# Patient Record
Sex: FEMALE | Race: WHITE | NOT HISPANIC OR LATINO | Employment: UNEMPLOYED | URBAN - METROPOLITAN AREA
[De-identification: names, ages, dates, MRNs, and addresses within clinical notes are randomized per-mention and may not be internally consistent; named-entity substitution may affect disease eponyms.]

---

## 2020-02-04 ENCOUNTER — TELEPHONE (OUTPATIENT)
Dept: RHEUMATOLOGY | Facility: CLINIC | Age: 40
End: 2020-02-04

## 2020-02-04 ENCOUNTER — TELEPHONE (OUTPATIENT)
Dept: DERMATOLOGY | Facility: CLINIC | Age: 40
End: 2020-02-04

## 2020-02-04 NOTE — TELEPHONE ENCOUNTER
----- Message from Shwetha Danielle sent at 2/4/2020  7:36 AM CST -----  Contact: pt [phone]  This will be a new pt who has a referral in the system she is asking for an appt.

## 2020-02-04 NOTE — TELEPHONE ENCOUNTER
Spoke to patient and booked for 3-3-20 at 11 am. Patient will bring records from previous rheumatologist. HANSEL

## 2020-02-29 ENCOUNTER — PATIENT MESSAGE (OUTPATIENT)
Dept: RHEUMATOLOGY | Facility: CLINIC | Age: 40
End: 2020-02-29

## 2020-03-03 ENCOUNTER — OFFICE VISIT (OUTPATIENT)
Dept: RHEUMATOLOGY | Facility: CLINIC | Age: 40
End: 2020-03-03
Payer: COMMERCIAL

## 2020-03-03 ENCOUNTER — LAB VISIT (OUTPATIENT)
Dept: LAB | Facility: HOSPITAL | Age: 40
End: 2020-03-03
Attending: INTERNAL MEDICINE
Payer: COMMERCIAL

## 2020-03-03 VITALS
WEIGHT: 127.63 LBS | DIASTOLIC BLOOD PRESSURE: 87 MMHG | HEIGHT: 66 IN | HEART RATE: 103 BPM | RESPIRATION RATE: 18 BRPM | SYSTOLIC BLOOD PRESSURE: 126 MMHG | BODY MASS INDEX: 20.51 KG/M2

## 2020-03-03 DIAGNOSIS — M35.00 SJOGREN'S SYNDROME WITHOUT EXTRAGLANDULAR INVOLVEMENT: ICD-10-CM

## 2020-03-03 DIAGNOSIS — L50.9 URTICARIA: ICD-10-CM

## 2020-03-03 DIAGNOSIS — D89.1 CRYOGLOBULINEMIC VASCULITIS: ICD-10-CM

## 2020-03-03 DIAGNOSIS — M35.00 SJOGREN'S SYNDROME WITHOUT EXTRAGLANDULAR INVOLVEMENT: Primary | ICD-10-CM

## 2020-03-03 DIAGNOSIS — I73.00 RAYNAUD'S PHENOMENON WITHOUT GANGRENE: ICD-10-CM

## 2020-03-03 LAB
C3 SERPL-MCNC: 106 MG/DL (ref 50–180)
C4 SERPL-MCNC: <3 MG/DL (ref 11–44)
CCP AB SER IA-ACNC: <0.5 U/ML
CRP SERPL-MCNC: 12.5 MG/L (ref 0–8.2)
ERYTHROCYTE [SEDIMENTATION RATE] IN BLOOD BY WESTERGREN METHOD: 20 MM/HR (ref 0–20)
IGA SERPL-MCNC: 313 MG/DL (ref 40–350)
RHEUMATOID FACT SERPL-ACNC: 484 IU/ML (ref 0–15)
THYROGLOB AB SERPL IA-ACNC: <4 IU/ML (ref 0–3.9)
THYROPEROXIDASE IGG SERPL-ACNC: <6 IU/ML

## 2020-03-03 PROCEDURE — 86332 IMMUNE COMPLEX ASSAY: CPT

## 2020-03-03 PROCEDURE — 99999 PR PBB SHADOW E&M-EST. PATIENT-LVL III: ICD-10-PCS | Mod: PBBFAC,,, | Performed by: INTERNAL MEDICINE

## 2020-03-03 PROCEDURE — 86235 NUCLEAR ANTIGEN ANTIBODY: CPT | Mod: 59

## 2020-03-03 PROCEDURE — 86235 NUCLEAR ANTIGEN ANTIBODY: CPT

## 2020-03-03 PROCEDURE — 86225 DNA ANTIBODY NATIVE: CPT

## 2020-03-03 PROCEDURE — 86376 MICROSOMAL ANTIBODY EACH: CPT

## 2020-03-03 PROCEDURE — 99999 PR PBB SHADOW E&M-EST. PATIENT-LVL III: CPT | Mod: PBBFAC,,, | Performed by: INTERNAL MEDICINE

## 2020-03-03 PROCEDURE — 99205 PR OFFICE/OUTPT VISIT, NEW, LEVL V, 60-74 MIN: ICD-10-PCS | Mod: S$GLB,,, | Performed by: INTERNAL MEDICINE

## 2020-03-03 PROCEDURE — 99205 OFFICE O/P NEW HI 60 MIN: CPT | Mod: S$GLB,,, | Performed by: INTERNAL MEDICINE

## 2020-03-03 PROCEDURE — 82784 ASSAY IGA/IGD/IGG/IGM EACH: CPT

## 2020-03-03 PROCEDURE — 86140 C-REACTIVE PROTEIN: CPT

## 2020-03-03 PROCEDURE — 86160 COMPLEMENT ANTIGEN: CPT

## 2020-03-03 PROCEDURE — 86160 COMPLEMENT ANTIGEN: CPT | Mod: 59

## 2020-03-03 PROCEDURE — 36415 COLL VENOUS BLD VENIPUNCTURE: CPT | Mod: PO

## 2020-03-03 PROCEDURE — 86200 CCP ANTIBODY: CPT

## 2020-03-03 PROCEDURE — 86800 THYROGLOBULIN ANTIBODY: CPT

## 2020-03-03 PROCEDURE — 86038 ANTINUCLEAR ANTIBODIES: CPT

## 2020-03-03 PROCEDURE — 3008F PR BODY MASS INDEX (BMI) DOCUMENTED: ICD-10-PCS | Mod: CPTII,S$GLB,, | Performed by: INTERNAL MEDICINE

## 2020-03-03 PROCEDURE — 83516 IMMUNOASSAY NONANTIBODY: CPT

## 2020-03-03 PROCEDURE — 86039 ANTINUCLEAR ANTIBODIES (ANA): CPT

## 2020-03-03 PROCEDURE — 86431 RHEUMATOID FACTOR QUANT: CPT

## 2020-03-03 PROCEDURE — 3008F BODY MASS INDEX DOCD: CPT | Mod: CPTII,S$GLB,, | Performed by: INTERNAL MEDICINE

## 2020-03-03 PROCEDURE — 85651 RBC SED RATE NONAUTOMATED: CPT | Mod: PO

## 2020-03-03 RX ORDER — BUSPIRONE HYDROCHLORIDE 10 MG/1
10 TABLET ORAL 3 TIMES DAILY
COMMUNITY
End: 2021-05-24 | Stop reason: SDUPTHER

## 2020-03-03 RX ORDER — TRAMADOL HYDROCHLORIDE 50 MG/1
50 TABLET ORAL EVERY 8 HOURS PRN
Qty: 90 TABLET | Refills: 3 | Status: SHIPPED | OUTPATIENT
Start: 2020-03-03 | End: 2020-07-20 | Stop reason: SDUPTHER

## 2020-03-03 RX ORDER — HYDROXYCHLOROQUINE SULFATE 200 MG/1
200 TABLET, FILM COATED ORAL DAILY
Qty: 90 TABLET | Refills: 3 | Status: SHIPPED | OUTPATIENT
Start: 2020-03-03 | End: 2020-05-26 | Stop reason: SDUPTHER

## 2020-03-03 RX ORDER — CEVIMELINE HYDROCHLORIDE 30 MG/1
30 CAPSULE ORAL 3 TIMES DAILY
Qty: 270 CAPSULE | Refills: 3 | Status: SHIPPED | OUTPATIENT
Start: 2020-03-03 | End: 2021-05-24 | Stop reason: SDUPTHER

## 2020-03-03 NOTE — LETTER
March 29, 2020      Nathan Núñez MD  [address] [facility] [address]  [facility] [address]           [place] [facility] [address] [place] [address]  Phone: [phone]  Fax: [phone]          Patient: Shamika Young   MR Number: [id number]   YOB: 1980   Date of Visit: 3/3/2020       Dear Dr. Nathan Núñez:    Thank you for referring Shamika Young to me for evaluation. Attached you will find relevant portions of my assessment and plan of care.    If you have questions, please do not hesitate to call me. I look forward to following Shamika Young along with you.    Sincerely,    Maryellen Buchanan, DO    Enclosure  CC:  No Recipients    If you would like to receive this communication electronically, please contact [email or web address] or [phone] to request more information on [employer] access.    For providers and/or their staff who would like to refer a patient to [facility], please contact us through our one-stop-shop provider referral line, [facility] , at [phone].    If you feel you have received this communication in error or would no longer like to receive these types of communications, please e-mail [email or web address]

## 2020-03-03 NOTE — PROGRESS NOTES
Subjective:          Chief Complaint: Shamika Young is a 39 y.o. female who had concerns including Pain and Disease Management.    HPI:    Patient is a 39-year-old female being referred by Dr. Chopra with hx of cryoglobulinemia vasculitis and Primary Sjogren's.    Patient seen at [facility]. Ultimately diagnosed [id number].  She states rash started during pregnancy of legs started with purpura of legs. She then developed more diffuse coalescing purpura but no open sores. No renal or respiratory disease to date.   Patient did have skin bx: LCV  Previously hypocomplementemic.   She did note fewer skin purpuric lesions after 2 cycles of RTX   She has noted more urticaria with wheal in the skin,  some reactions with food, wheat/rice seem to be worst. She notes no new soaps or detergents. Present since 2017.   She has seen allergist with some nut and environmental allergies no changes present since childhood.   Dry eyes present but tolerates contacts.   Dry mouth more problematic.   Joints fluctuate between 6-9/10. Hand and wrist predominant.   No real Raynauds in triphasic pattern but cold hands and feet.   Patient c/o swelling of lips at times-no known food allergies.     PFTs at [facility] were reportedly normal, no hemoptysis or SOB  Thinks she had CT chest.   No bone marrow bx.   No DVT, miscarriages, seizures or strokes.   No renal disease.        She is on hydroxychloroquine 200 mg twice daily.  Rituxan last dose 7/2019  cevimiline 30mg TID  Prednisone 10mg once daily PRN ( during flare daily rest is periodical.   Rituximab last dose 7/2019 (x 3 treatments 6 months apart. )     Previous medications: none  No hx of hepatitis, no malignancy in self to date.     REVIEW OF SYSTEMS:    Review of Systems   Constitutional: Positive for malaise/fatigue. Negative for fever and weight loss.   HENT: Negative for sore throat.    Eyes: Negative for double vision, photophobia and redness.   Respiratory: Negative for cough,  shortness of breath and wheezing.    Cardiovascular: Negative for chest pain, palpitations and orthopnea.   Gastrointestinal: Negative for abdominal pain, constipation and diarrhea.   Genitourinary: Negative for dysuria, hematuria and urgency.   Musculoskeletal: Positive for joint pain and myalgias. Negative for back pain.   Skin: Positive for rash (livedo).   Neurological: Negative for dizziness, tingling, focal weakness and headaches.   Endo/Heme/Allergies: Does not bruise/bleed easily.   Psychiatric/Behavioral: Negative for depression, hallucinations and suicidal ideas. The patient is nervous/anxious.                Objective:            Past Medical History:   Diagnosis Date    Allergy     Anxiety     Depression     Liver disease     Lumbago with sciatica, right side     Lumbar disc disease     L4-L5    Other chronic pain     Panic disorder     Positive CHELSEA (antinuclear antibody)     Purpura     Sjogren's syndrome     Vasculitis      Family History   Problem Relation Age of Onset    Multiple sclerosis Mother     Heart disease Mother     Diabetes Mother     Hypertension Mother     Heart disease Father     Hyperlipidemia Father      Social History     Tobacco Use    Smoking status: Former Smoker    Smokeless tobacco: Never Used   Substance Use Topics    Alcohol use: Yes     Comment: socially    Drug use: Never         Current Outpatient Medications on File Prior to Visit   Medication Sig Dispense Refill    ALPRAZolam (XANAX) 0.5 MG tablet Take 0.5 mg by mouth 3 (three) times daily.      busPIRone (BUSPAR) 10 MG tablet Take 10 mg by mouth 3 (three) times daily.      cevimeline (EVOXAC) 30 mg capsule Take 30 mg by mouth 3 (three) times daily.      hydroxychloroquine (PLAQUENIL) 200 mg tablet Take 200 mg by mouth once daily.      predniSONE (DELTASONE) 5 MG tablet Take 5 mg by mouth 2 (two) times daily.      traMADol (ULTRAM) 50 mg tablet Take 50 mg by mouth every 6 (six) hours.       No  current facility-administered medications on file prior to visit.        Vitals:    03/03/20 1118   BP: 126/87   Pulse: 103   Resp: 18       Physical Exam:    Physical Exam   Constitutional: She is oriented to person, place, and time. She appears well-developed and well-nourished.   HENT:   Head: Normocephalic and atraumatic.   Mouth/Throat: Oropharynx is clear and moist.   Eyes: Pupils are equal, round, and reactive to light. EOM are normal.   Neck: Normal range of motion.   Cardiovascular: Normal rate, regular rhythm and normal heart sounds.   Pulmonary/Chest: Effort normal and breath sounds normal.   Musculoskeletal:        Right shoulder: She exhibits normal range of motion, no tenderness and no swelling.        Left shoulder: She exhibits normal range of motion, no tenderness and no swelling.        Right elbow: She exhibits normal range of motion and no swelling. No tenderness found.        Left elbow: She exhibits normal range of motion and no swelling. No tenderness found.        Right wrist: She exhibits normal range of motion, no tenderness and no swelling.        Left wrist: She exhibits normal range of motion, no tenderness and no swelling.        Right knee: She exhibits normal range of motion and no swelling. No tenderness found.        Left knee: She exhibits normal range of motion and no swelling. No tenderness found.        Right hand: She exhibits normal range of motion, no tenderness and no swelling.        Left hand: She exhibits normal range of motion, no tenderness and no swelling.        Right foot: There is normal range of motion, no tenderness and no swelling.        Left foot: There is normal range of motion, no tenderness and no swelling.   Neurological: She is alert and oriented to person, place, and time.   Skin: Skin is warm and dry.   +livedo various pigmentary changes on thighs and legs   No petechia no ulcerations.    Psychiatric: She has a normal mood and affect. Her behavior is  normal.             Assessment:       Encounter Diagnoses   Name Primary?    Sjogren's syndrome without extraglandular involvement Yes    Cryoglobulinemic vasculitis     Raynaud's phenomenon without gangrene     Urticaria           Plan:        Sjogren's syndrome without extraglandular involvement  -     C1Q BINDING ASSAY; Future; Expected date: 03/03/2020  -     C3 complement; Future; Expected date: 03/03/2020  -     C4 complement; Future; Expected date: 03/03/2020  -     Sjogrens syndrome-A extractable nuclear antibody; Future; Expected date: 03/03/2020  -     Sjogrens syndrome-B extractable nuclear antibody; Future; Expected date: 03/03/2020  -     Rheumatoid factor; Future; Expected date: 03/03/2020  -     Cyclic citrul peptide antibody, IgG; Future; Expected date: 03/03/2020  -     Sedimentation rate; Future; Expected date: 03/03/2020  -     C-reactive protein; Standing; Expected date: 03/03/2020  -     CHELSEA Screen w/Reflex; Future; Expected date: 03/03/2020  -     Anti-Smith antibody; Future; Expected date: 03/03/2020  -     Anti Sm/RNP Antibody; Future; Expected date: 03/03/2020  -     Anti-DNA antibody, double-stranded; Future; Expected date: 03/03/2020  -     C1 Esterase Inhibitor Panel; Future; Expected date: 03/03/2020  -     Tissue transglutaminase, IgA; Future; Expected date: 03/03/2020  -     IgA; Future; Expected date: 03/03/2020  -     Anti-thyroglobulin antibody; Future; Expected date: 03/03/2020  -     Thyroid peroxidase antibody; Future; Expected date: 03/03/2020  -     cevimeline (EVOXAC) 30 mg capsule; Take 1 capsule (30 mg total) by mouth 3 (three) times daily.  Dispense: 270 capsule; Refill: 3  -     traMADol (ULTRAM) 50 mg tablet; Take 1 tablet (50 mg total) by mouth every 8 (eight) hours as needed for Pain.  Dispense: 90 tablet; Refill: 3  -     hydroxychloroquine (PLAQUENIL) 200 mg tablet; Take 1 tablet (200 mg total) by mouth once daily.  Dispense: 90 tablet; Refill:  3    Cryoglobulinemic vasculitis  -     C1Q BINDING ASSAY; Future; Expected date: 03/03/2020  -     C3 complement; Future; Expected date: 03/03/2020  -     C4 complement; Future; Expected date: 03/03/2020  -     Sjogrens syndrome-A extractable nuclear antibody; Future; Expected date: 03/03/2020  -     Sjogrens syndrome-B extractable nuclear antibody; Future; Expected date: 03/03/2020  -     Rheumatoid factor; Future; Expected date: 03/03/2020  -     Cyclic citrul peptide antibody, IgG; Future; Expected date: 03/03/2020  -     Sedimentation rate; Future; Expected date: 03/03/2020  -     C-reactive protein; Standing; Expected date: 03/03/2020  -     CHELSEA Screen w/Reflex; Future; Expected date: 03/03/2020  -     Anti-Smith antibody; Future; Expected date: 03/03/2020  -     Anti Sm/RNP Antibody; Future; Expected date: 03/03/2020  -     Anti-DNA antibody, double-stranded; Future; Expected date: 03/03/2020  -     C1 Esterase Inhibitor Panel; Future; Expected date: 03/03/2020  -     Tissue transglutaminase, IgA; Future; Expected date: 03/03/2020  -     IgA; Future; Expected date: 03/03/2020  -     Anti-thyroglobulin antibody; Future; Expected date: 03/03/2020  -     Thyroid peroxidase antibody; Future; Expected date: 03/03/2020  -     cevimeline (EVOXAC) 30 mg capsule; Take 1 capsule (30 mg total) by mouth 3 (three) times daily.  Dispense: 270 capsule; Refill: 3  -     traMADol (ULTRAM) 50 mg tablet; Take 1 tablet (50 mg total) by mouth every 8 (eight) hours as needed for Pain.  Dispense: 90 tablet; Refill: 3  -     hydroxychloroquine (PLAQUENIL) 200 mg tablet; Take 1 tablet (200 mg total) by mouth once daily.  Dispense: 90 tablet; Refill: 3    Raynaud's phenomenon without gangrene  -     C1Q BINDING ASSAY; Future; Expected date: 03/03/2020  -     C3 complement; Future; Expected date: 03/03/2020  -     C4 complement; Future; Expected date: 03/03/2020  -     Sjogrens syndrome-A extractable nuclear antibody; Future; Expected  date: 03/03/2020  -     Sjogrens syndrome-B extractable nuclear antibody; Future; Expected date: 03/03/2020  -     Rheumatoid factor; Future; Expected date: 03/03/2020  -     Cyclic citrul peptide antibody, IgG; Future; Expected date: 03/03/2020  -     Sedimentation rate; Future; Expected date: 03/03/2020  -     C-reactive protein; Standing; Expected date: 03/03/2020  -     CHELSEA Screen w/Reflex; Future; Expected date: 03/03/2020  -     Anti-Smith antibody; Future; Expected date: 03/03/2020  -     Anti Sm/RNP Antibody; Future; Expected date: 03/03/2020  -     Anti-DNA antibody, double-stranded; Future; Expected date: 03/03/2020  -     C1 Esterase Inhibitor Panel; Future; Expected date: 03/03/2020  -     Tissue transglutaminase, IgA; Future; Expected date: 03/03/2020  -     IgA; Future; Expected date: 03/03/2020  -     Anti-thyroglobulin antibody; Future; Expected date: 03/03/2020  -     Thyroid peroxidase antibody; Future; Expected date: 03/03/2020  -     cevimeline (EVOXAC) 30 mg capsule; Take 1 capsule (30 mg total) by mouth 3 (three) times daily.  Dispense: 270 capsule; Refill: 3  -     traMADol (ULTRAM) 50 mg tablet; Take 1 tablet (50 mg total) by mouth every 8 (eight) hours as needed for Pain.  Dispense: 90 tablet; Refill: 3  -     hydroxychloroquine (PLAQUENIL) 200 mg tablet; Take 1 tablet (200 mg total) by mouth once daily.  Dispense: 90 tablet; Refill: 3    Urticaria  -     C1Q BINDING ASSAY; Future; Expected date: 03/03/2020  -     C3 complement; Future; Expected date: 03/03/2020  -     C4 complement; Future; Expected date: 03/03/2020  -     Sjogrens syndrome-A extractable nuclear antibody; Future; Expected date: 03/03/2020  -     Sjogrens syndrome-B extractable nuclear antibody; Future; Expected date: 03/03/2020  -     Rheumatoid factor; Future; Expected date: 03/03/2020  -     Cyclic citrul peptide antibody, IgG; Future; Expected date: 03/03/2020  -     Sedimentation rate; Future; Expected date:  03/03/2020  -     C-reactive protein; Standing; Expected date: 03/03/2020  -     CHELSEA Screen w/Reflex; Future; Expected date: 03/03/2020  -     Anti-Smith antibody; Future; Expected date: 03/03/2020  -     Anti Sm/RNP Antibody; Future; Expected date: 03/03/2020  -     Anti-DNA antibody, double-stranded; Future; Expected date: 03/03/2020  -     C1 Esterase Inhibitor Panel; Future; Expected date: 03/03/2020  -     Tissue transglutaminase, IgA; Future; Expected date: 03/03/2020  -     IgA; Future; Expected date: 03/03/2020  -     Anti-thyroglobulin antibody; Future; Expected date: 03/03/2020  -     Thyroid peroxidase antibody; Future; Expected date: 03/03/2020  -     cevimeline (EVOXAC) 30 mg capsule; Take 1 capsule (30 mg total) by mouth 3 (three) times daily.  Dispense: 270 capsule; Refill: 3  -     traMADol (ULTRAM) 50 mg tablet; Take 1 tablet (50 mg total) by mouth every 8 (eight) hours as needed for Pain.  Dispense: 90 tablet; Refill: 3  -     hydroxychloroquine (PLAQUENIL) 200 mg tablet; Take 1 tablet (200 mg total) by mouth once daily.  Dispense: 90 tablet; Refill: 3    Patient with Cryoglobulinemic vasculitis treated last 7/2019 per RA protocol    Serologies today.    Tramadol PRN    Prednisone 5mg daily    Primary Sjogrens:   HCQ 200mg BID   Evoxac 30mg TID    Likely FMS: robaxin      Follow up in about 4 months (around 7/3/2020).      60min consultation with greater than 50% spent in counseling, chart review and coordination of care. All questions answered.  Thank you for allowing me to participate in the care of this very pleasant patient.        Received records-     PFT 7/2019:   CT chest w/o contras 8/2019: no fibrosis or changes to suggest ILD or lymphoid interstitial pneumonia. Calcified granuloma LLL. nonenlarged mediatstinal and hilar lymph nodes may be reactive.     UA 2019: no proteinuria/ no RBCs  Quant IgG: IgG slightly elevated, remaining ENL  SPEP 2019: polyclonal hypergammaglobulinemia, cannot  r/o small monoclonal protein  Cryos 7/2019: 3  Cryofibrinogen 7/2019: negative  C3 normal  C4 3 and low.   High titer RF, SSA and SSB.   Normal ESR and CRP    Cryos: 3/2019 trace   Immunofixatio: 1/30/19: type II cryos monoclonal IgM kappa plus polyclonal IgG.     Baseline eye exam: dates 6/2018    Pulm Consult: 7/2019: Pt c/o SOB felt after w/up this was possible cord dysfunction vs rhinosinusitis.

## 2020-03-05 ENCOUNTER — PATIENT MESSAGE (OUTPATIENT)
Dept: RHEUMATOLOGY | Facility: CLINIC | Age: 40
End: 2020-03-05

## 2020-03-05 LAB
ANA SER QL IF: POSITIVE
ANA TITR SER IF: NORMAL {TITER}
DSDNA AB SER-ACNC: NORMAL [IU]/ML

## 2020-03-06 LAB — TTG IGA SER-ACNC: 4 UNITS

## 2020-03-07 LAB
ANTI SM ANTIBODY: 0.07 RATIO (ref 0–0.99)
ANTI SM/RNP ANTIBODY: 0.17 RATIO (ref 0–0.99)
ANTI-SM INTERPRETATION: NEGATIVE
ANTI-SM/RNP INTERPRETATION: NEGATIVE
ANTI-SSA ANTIBODY: 4.67 RATIO (ref 0–0.99)
ANTI-SSA INTERPRETATION: POSITIVE
ANTI-SSB ANTIBODY: 2.4 RATIO (ref 0–0.99)
ANTI-SSB INTERPRETATION: POSITIVE

## 2020-03-10 LAB — IC SERPL C1Q BIND-ACNC: 2.1 UG EQ/ML (ref 0–3.9)

## 2020-03-11 LAB — C1INH SERPL-MCNC: 37 MG/DL (ref 21–39)

## 2020-03-12 ENCOUNTER — PATIENT MESSAGE (OUTPATIENT)
Dept: RHEUMATOLOGY | Facility: CLINIC | Age: 40
End: 2020-03-12

## 2020-03-12 RX ORDER — PREDNISONE 5 MG/1
5 TABLET ORAL 2 TIMES DAILY
Qty: 60 TABLET | Refills: 11 | Status: SHIPPED | OUTPATIENT
Start: 2020-03-12 | End: 2021-03-12

## 2020-03-13 ENCOUNTER — PATIENT MESSAGE (OUTPATIENT)
Dept: RHEUMATOLOGY | Facility: CLINIC | Age: 40
End: 2020-03-13

## 2020-03-13 NOTE — LETTER
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 We are recommending restriction on travel both domestically and internationally. Given, Shamika Young 's  current medical diagnoses ,I am specifically requesting that s/he transition to remote continuation of her work.    I am aware that not all employers have transitioned to date, but request s/he be availed any and all accommodations available that will allow her/him to complete their work for the foreseeable future. I am making recommendations at this time in 2 week increments and will reassess as more information is available.     I at not time intend to place undue burden to the supervisors, administrators or coworkers of Shamika Young, we are acting only out of an abundance of caution for health and safety.      Sincerely,        Maryellen Buchanan DO, FACOI [facility]  Department of Rheumatology   Senior Staff  [phone]

## 2020-03-24 ENCOUNTER — PATIENT MESSAGE (OUTPATIENT)
Dept: RHEUMATOLOGY | Facility: CLINIC | Age: 40
End: 2020-03-24

## 2020-03-26 NOTE — TELEPHONE ENCOUNTER
"Contacted pt, informed of labs results per Dr. Perez note. Offered telemedicine visit, pt states "she has answered all of my questions I will hold off on a visit for now."  "

## 2020-04-28 ENCOUNTER — PATIENT MESSAGE (OUTPATIENT)
Dept: RHEUMATOLOGY | Facility: CLINIC | Age: 40
End: 2020-04-28

## 2020-04-28 NOTE — TELEPHONE ENCOUNTER
Ultimately we would need a biopsy to know for sure.   We cannot do procedures unless emergent that I am aware. Can check with Dr. Cherry Buchanan thanks.     As for labs only +CHELSEA no other lupus markers. So biopsy will be necessary at some point.   DR. MOJICA

## 2020-05-12 ENCOUNTER — TELEPHONE (OUTPATIENT)
Dept: DERMATOLOGY | Facility: CLINIC | Age: 40
End: 2020-05-12

## 2020-05-12 ENCOUNTER — TELEPHONE (OUTPATIENT)
Dept: RHEUMATOLOGY | Facility: CLINIC | Age: 40
End: 2020-05-12

## 2020-05-12 NOTE — TELEPHONE ENCOUNTER
----- Message from Agustina Abdi LPN sent at 5/11/2020  8:45 AM CDT -----  Regarding: FW:   Sorry for the delay I was redeployed. We are starting to do appointments in office again with some regulations as I can assume you are aware. Have pt call to set up an appointment if they want to see Dr. Cherry Buchanan I would recommend having the patient specify so that way her nurse can see about getting it scheduled in a timely manner as I am unsure about their schedule and how it is flowing.    ----- Message -----  From: Digna Armijo LPN  Sent: 4/29/2020  12:04 PM CDT  To: Agustina Abdi LPN  Subject: RE:                                              Any idea when they will be doing biopsies again? Thanks-Digna  ----- Message -----  From: Agustina Abdi LPN  Sent: 4/29/2020   9:07 AM CDT  To: Digna Armijo LPN  Subject: RE:                                              Has patient seen dermatology before or is there no preference. I can see about next Tuesday. We currently do not have any providers in office. They are only doing Virtual Visits.  ----- Message -----  From: Digna Armijo LPN  Sent: 4/28/2020  12:31 PM CDT  To: Dewayne SOTO Staff    This patient has an unusual rash that needs a biopsy per Dr. Buchanan in Rheum. She has sent pictures through the portal. Does have a positive CHELSEA but no other markers for lupus. Are you all doing biopsies at this time? Please advise.   Thank you-  Digna Armijo LPN

## 2020-05-12 NOTE — TELEPHONE ENCOUNTER
Pt stated Dr Maryellen Buchanan wanted rash bx'd . Pt stated she no longer has rash . I advised pt to call office back if rash appears & leave msg that you need biopsied per Dr Maryellen Buchanan . That last time you were contacted rash had resolved Pt understood & agreed .

## 2020-05-15 NOTE — TELEPHONE ENCOUNTER
Spoke to pt to schedule appt for DERM and she stated the rash that she had is now gone and she thinks it was possibly triggered by sunlight. Pt would like to know does she still need to see DERM? Please advise. Thanks.

## 2020-05-18 ENCOUNTER — PATIENT MESSAGE (OUTPATIENT)
Dept: RHEUMATOLOGY | Facility: CLINIC | Age: 40
End: 2020-05-18

## 2020-05-26 ENCOUNTER — PATIENT MESSAGE (OUTPATIENT)
Dept: RHEUMATOLOGY | Facility: CLINIC | Age: 40
End: 2020-05-26

## 2020-05-26 DIAGNOSIS — M35.00 SJOGREN'S SYNDROME WITHOUT EXTRAGLANDULAR INVOLVEMENT: ICD-10-CM

## 2020-05-26 DIAGNOSIS — I73.00 RAYNAUD'S PHENOMENON WITHOUT GANGRENE: ICD-10-CM

## 2020-05-26 DIAGNOSIS — D89.1 CRYOGLOBULINEMIC VASCULITIS: ICD-10-CM

## 2020-05-26 DIAGNOSIS — L50.9 URTICARIA: ICD-10-CM

## 2020-05-26 RX ORDER — HYDROXYCHLOROQUINE SULFATE 200 MG/1
200 TABLET, FILM COATED ORAL 2 TIMES DAILY
Qty: 180 TABLET | Refills: 3 | Status: SHIPPED | OUTPATIENT
Start: 2020-05-26 | End: 2021-05-24 | Stop reason: SDUPTHER

## 2020-07-14 ENCOUNTER — PATIENT MESSAGE (OUTPATIENT)
Dept: RHEUMATOLOGY | Facility: CLINIC | Age: 40
End: 2020-07-14

## 2020-07-14 ENCOUNTER — LAB VISIT (OUTPATIENT)
Dept: FAMILY MEDICINE | Facility: CLINIC | Age: 40
End: 2020-07-14
Payer: COMMERCIAL

## 2020-07-14 DIAGNOSIS — Z01.818 PRE-OP TESTING: ICD-10-CM

## 2020-07-14 PROCEDURE — U0003 INFECTIOUS AGENT DETECTION BY NUCLEIC ACID (DNA OR RNA); SEVERE ACUTE RESPIRATORY SYNDROME CORONAVIRUS 2 (SARS-COV-2) (CORONAVIRUS DISEASE [COVID-19]), AMPLIFIED PROBE TECHNIQUE, MAKING USE OF HIGH THROUGHPUT TECHNOLOGIES AS DESCRIBED BY CMS-2020-01-R: HCPCS

## 2020-07-15 LAB — SARS-COV-2 RNA RESP QL NAA+PROBE: NOT DETECTED

## 2020-07-17 PROBLEM — Z30.433 ENCOUNTER FOR IUD REMOVAL AND REINSERTION: Status: ACTIVE | Noted: 2020-07-17

## 2020-07-17 PROBLEM — T83.32XS: Status: ACTIVE | Noted: 2020-07-17

## 2020-07-17 PROBLEM — Z98.890 STATUS POST HYSTEROSCOPY: Status: ACTIVE | Noted: 2020-07-17

## 2020-07-20 ENCOUNTER — OFFICE VISIT (OUTPATIENT)
Dept: RHEUMATOLOGY | Facility: CLINIC | Age: 40
End: 2020-07-20
Payer: COMMERCIAL

## 2020-07-20 VITALS — WEIGHT: 133 LBS | HEIGHT: 66 IN | BODY MASS INDEX: 21.38 KG/M2

## 2020-07-20 DIAGNOSIS — L50.9 URTICARIA: ICD-10-CM

## 2020-07-20 DIAGNOSIS — D89.1 CRYOGLOBULINEMIC VASCULITIS: Primary | ICD-10-CM

## 2020-07-20 DIAGNOSIS — I73.00 RAYNAUD'S PHENOMENON WITHOUT GANGRENE: ICD-10-CM

## 2020-07-20 DIAGNOSIS — M35.00 SJOGREN'S SYNDROME WITHOUT EXTRAGLANDULAR INVOLVEMENT: ICD-10-CM

## 2020-07-20 PROCEDURE — 99215 OFFICE O/P EST HI 40 MIN: CPT | Mod: 95,,, | Performed by: INTERNAL MEDICINE

## 2020-07-20 PROCEDURE — 3008F BODY MASS INDEX DOCD: CPT | Mod: CPTII,,, | Performed by: INTERNAL MEDICINE

## 2020-07-20 PROCEDURE — 3008F PR BODY MASS INDEX (BMI) DOCUMENTED: ICD-10-PCS | Mod: CPTII,,, | Performed by: INTERNAL MEDICINE

## 2020-07-20 PROCEDURE — 99215 PR OFFICE/OUTPT VISIT, EST, LEVL V, 40-54 MIN: ICD-10-PCS | Mod: 95,,, | Performed by: INTERNAL MEDICINE

## 2020-07-20 RX ORDER — TRAMADOL HYDROCHLORIDE 50 MG/1
50 TABLET ORAL EVERY 8 HOURS PRN
Qty: 90 TABLET | Refills: 3 | Status: SHIPPED | OUTPATIENT
Start: 2020-07-20 | End: 2020-11-25

## 2020-07-20 NOTE — PROGRESS NOTES
Subjective:          Chief Complaint: Shamika Young is a 39 y.o. female who had concerns including Disease Management.    HPI:    Patient is a 39-year-old female being referred by Dr. Chopra with hx of cryoglobulinemia vasculitis and Primary Sjogren's.        Patient seen at [facility]. Ultimately diagnosed [id number].  She states rash started during pregnancy of legs started with purpura of legs. She then developed more diffuse coalescing purpura but no open sores. No renal or respiratory disease to date.   Patient did have skin bx: LCV  Previously hypocomplementemic.   She did note fewer skin purpuric lesions after 2 cycles of RTX   She has noted more urticaria with wheal in the skin,  some reactions with food, wheat/rice seem to be worst. She notes no new soaps or detergents. Present since 2017.   She has seen allergist with some nut and environmental allergies no changes present since childhood. - working with DR. Curtis.   Dry eyes present but tolerates contacts.   Dry mouth more problematic.   Joints fluctuate between 6-9/10. Hand and wrist predominant.   No real Raynauds in triphasic pattern but cold hands and feet.   Patient c/o swelling of lips at times-no known food allergies.       She is noting more livedo and mottling in legs w/o activity, ++ fatigue increasing. She does note during flares some joint pains. 2-5 days every 2 weeks. Flares seem to be ocurring every 2 weeks.   She notes working from home has helped with overall fatigue/pain.   No numbness or tingling.     PFTs at [facility] were reportedly normal 7/2019 , no hemoptysis or SOB  Thinks she had CT chest.   No bone marrow bx.   No DVT, miscarriages, seizures or strokes.   No renal disease.        She is on hydroxychloroquine 200 mg twice daily.  Rituxan last dose 7/2019  cevimiline 30mg TID  Prednisone 10mg once daily PRN ( during flare daily rest is periodical.   Rituximab last dose 7/2019 (x 3 treatments 6 months apart. )     OTC:    Xylimelts.   Not using biotene.   sugarfree lemon  gylcerin lozenges.     Previous medications: Salagen with ASE with HA.   No hx of hepatitis, no malignancy in self to date.     Component      Latest Ref Rng & Units 7/2/2020 3/3/2020   Anti-SSA Antibody      0.00 - 0.99 Ratio  4.67 (H)   Anti-SSA Interpretation      Negative  Positive (A)   Anti-SSB Antibody      0.00 - 0.99 Ratio  2.40 (H)   Anti-SSB Interpretation      Negative  Positive (A)   Anti Sm Antibody      0.00 - 0.99 Ratio  0.07   Anti-Sm Interpretation      Negative  Negative   Anti Sm/RNP Antibody      0.00 - 0.99 Ratio  0.17   Anti-Sm/RNP Interpretation      Negative  Negative   Complement (C-3)      50 - 180 mg/dL  106   Complement (C-4)      11 - 44 mg/dL  <3 (L)   Rheumatoid Factor      0.0 - 15.0 IU/mL  484.0 (H)   CCP Antibodies      <5.0 U/mL  <0.5   Sed Rate      0 - 20 mm/Hr  20   CRP      0.0 - 8.2 mg/L  12.5 (H)   ds DNA Ab      Negative 1:10  Negative 1:10   C1 Esterase Inhibitor      21 - 39 mg/dL 36 37   CHELSEA HEP-2 Titer        Positive >=1:2560 Speckled   Urticaria-Inducing Activity      <=10.0 Index Unit 2.2    Anti-C1q Antibody, IgG      0 - 19 Units 5      REVIEW OF SYSTEMS:    Review of Systems   Constitutional: Positive for malaise/fatigue. Negative for fever and weight loss.   HENT: Negative for sore throat.    Eyes: Negative for double vision, photophobia and redness.   Respiratory: Negative for cough, shortness of breath and wheezing.    Cardiovascular: Negative for chest pain, palpitations and orthopnea.   Gastrointestinal: Negative for abdominal pain, constipation and diarrhea.   Genitourinary: Negative for dysuria, hematuria and urgency.   Musculoskeletal: Positive for joint pain and myalgias. Negative for back pain.   Skin: Positive for rash (livedo).   Neurological: Negative for dizziness, tingling, focal weakness and headaches.   Endo/Heme/Allergies: Does not bruise/bleed easily.   Psychiatric/Behavioral: Negative for  depression, hallucinations and suicidal ideas. The patient is nervous/anxious.                Objective:            Past Medical History:   Diagnosis Date    Allergy     previously on immunotherapy    Anxiety     Depression     Lumbago with sciatica, right side     Lumbar disc disease     L4-L5    Other chronic pain     Panic disorder     Positive CHELSEA (antinuclear antibody)     Purpura     Sjogren's syndrome     Vasculitis      Family History   Problem Relation Age of Onset    Multiple sclerosis Mother     Heart disease Mother     Diabetes Mother     Hypertension Mother     Heart disease Father     Hyperlipidemia Father      Social History     Tobacco Use    Smoking status: Former Smoker    Smokeless tobacco: Never Used   Substance Use Topics    Alcohol use: Yes     Comment: socially    Drug use: Never         Current Outpatient Medications on File Prior to Visit   Medication Sig Dispense Refill    busPIRone (BUSPAR) 10 MG tablet Take 10 mg by mouth 3 (three) times daily.      cevimeline (EVOXAC) 30 mg capsule Take 1 capsule (30 mg total) by mouth 3 (three) times daily. 270 capsule 3    hydroxychloroquine (PLAQUENIL) 200 mg tablet Take 1 tablet (200 mg total) by mouth 2 (two) times daily. 180 tablet 3    levocetirizine (XYZAL) 5 MG tablet Take 5 mg by mouth once daily.      ALPRAZolam (XANAX) 0.5 MG tablet Take 0.5 mg by mouth 3 (three) times daily.      ibuprofen (ADVIL,MOTRIN) 800 MG tablet Take 1 tablet (800 mg total) by mouth every 8 (eight) hours as needed for Pain. (Patient not taking: Reported on 7/20/2020) 30 tablet 0    predniSONE (DELTASONE) 5 MG tablet Take 1 tablet (5 mg total) by mouth 2 (two) times daily. (Patient not taking: Reported on 7/20/2020) 60 tablet 11    traMADol (ULTRAM) 50 mg tablet Take 1 tablet (50 mg total) by mouth every 8 (eight) hours as needed for Pain. (Patient not taking: Reported on 7/20/2020) 90 tablet 3     No current facility-administered  medications on file prior to visit.        There were no vitals filed for this visit.    Physical Exam:    Physical Exam  Constitutional:       Appearance: She is well-developed.   Eyes:      General: Lids are normal.   Skin:     Comments: Legs with some mottling and livedo.    Neurological:      Mental Status: She is oriented to person, place, and time.   Psychiatric:         Behavior: Behavior normal.         Thought Content: Thought content normal.               Assessment:       Encounter Diagnoses   Name Primary?    Cryoglobulinemic vasculitis Yes    Sjogren's syndrome without extraglandular involvement     Raynaud's phenomenon without gangrene     Urticaria           Plan:        Cryoglobulinemic vasculitis  -     Cryoglobulin; Future; Expected date: 07/20/2020  -     Protein electrophoresis, serum; Future; Expected date: 07/20/2020  -     Sedimentation rate; Future; Expected date: 07/20/2020  -     C-Reactive Protein; Standing; Expected date: 07/20/2020  -     traMADoL (ULTRAM) 50 mg tablet; Take 1 tablet (50 mg total) by mouth every 8 (eight) hours as needed for Pain.  Dispense: 90 tablet; Refill: 3    Sjogren's syndrome without extraglandular involvement  -     Cryoglobulin; Future; Expected date: 07/20/2020  -     Protein electrophoresis, serum; Future; Expected date: 07/20/2020  -     Sedimentation rate; Future; Expected date: 07/20/2020  -     C-Reactive Protein; Standing; Expected date: 07/20/2020  -     traMADoL (ULTRAM) 50 mg tablet; Take 1 tablet (50 mg total) by mouth every 8 (eight) hours as needed for Pain.  Dispense: 90 tablet; Refill: 3    Raynaud's phenomenon without gangrene  -     Cryoglobulin; Future; Expected date: 07/20/2020  -     Protein electrophoresis, serum; Future; Expected date: 07/20/2020  -     Sedimentation rate; Future; Expected date: 07/20/2020  -     C-Reactive Protein; Standing; Expected date: 07/20/2020  -     traMADoL (ULTRAM) 50 mg tablet; Take 1 tablet (50 mg total)  by mouth every 8 (eight) hours as needed for Pain.  Dispense: 90 tablet; Refill: 3    Urticaria  -     Cryoglobulin; Future; Expected date: 07/20/2020  -     Protein electrophoresis, serum; Future; Expected date: 07/20/2020  -     Sedimentation rate; Future; Expected date: 07/20/2020  -     C-Reactive Protein; Standing; Expected date: 07/20/2020  -     traMADoL (ULTRAM) 50 mg tablet; Take 1 tablet (50 mg total) by mouth every 8 (eight) hours as needed for Pain.  Dispense: 90 tablet; Refill: 3         Patient with Cryoglobulinemic vasculitis treated last 7/2019 per RA protocol    Serologies today. -only need SPEP and cryos.    Tramadol PRN    Prednisone 5mg daily, may increase to 10 PRN flares.     Discussed the nature of current pandemic and if cryos elevated may need to consider cycle of RTX.     Primary Sjogrens:   HCQ 200mg BID   Evoxac 30mg TID   Send for Aquarol:     Likely FMS: robaxin        Immunosuppression status: may need RTX, working predominantly from home.     No follow-ups on file.      40min consultation with greater than 50% spent in counseling, chart review and coordination of care. All questions answered.  Thank you for allowing me to participate in the care of this very pleasant patient.        Received records-     PFT 7/2019:   CT chest w/o contras 8/2019: no fibrosis or changes to suggest ILD or lymphoid interstitial pneumonia. Calcified granuloma LLL. nonenlarged mediatstinal and hilar lymph nodes may be reactive.     UA 2019: no proteinuria/ no RBCs  Quant IgG: IgG slightly elevated, remaining ENL  SPEP 2019: polyclonal hypergammaglobulinemia, cannot r/o small monoclonal protein  Cryos 7/2019: 3  Cryofibrinogen 7/2019: negative  C3 normal  C4 3 and low.   High titer RF, SSA and SSB.   Normal ESR and CRP    Cryos: 3/2019 trace   Immunofixatio: 1/30/19: type II cryos monoclonal IgM kappa plus polyclonal IgG.     Baseline eye exam: dates 6/2018    Pulm Consult: 7/2019: Pt c/o SOB felt after  w/up this was possible cord dysfunction vs rhinosinusitis.       The patient location is: Home [place]  The chief complaint leading to consultation is: medication management and f/u   Visit type: Virtual visit with synchronous audio and video  Total time spent with patient: 30  Each patient to whom he or she provides medical services by telemedicine is:  (1) informed of the relationship between the physician and patient and the respective role of any other health care provider with respect to management of the patient; and (2) notified that he or she may decline to receive medical services by telemedicine and may withdraw from such care at any time.    Notes:   As above.

## 2020-08-06 ENCOUNTER — TELEPHONE (OUTPATIENT)
Dept: OPTOMETRY | Facility: CLINIC | Age: 40
End: 2020-08-06

## 2020-08-06 NOTE — TELEPHONE ENCOUNTER
----- Message from Shwetha Danielle sent at 8/6/2020 11:07 AM CDT -----  Regarding: appt  Contact: pt  Appt    Patient is returning a call back to the office to make an appt.   She missed a call from the nurse.   Call back [phone]

## 2020-08-09 ENCOUNTER — PATIENT MESSAGE (OUTPATIENT)
Dept: RHEUMATOLOGY | Facility: CLINIC | Age: 40
End: 2020-08-09

## 2020-08-09 DIAGNOSIS — D89.1 CRYOGLOBULINEMIC VASCULITIS: Primary | ICD-10-CM

## 2020-08-09 DIAGNOSIS — R53.81 MALAISE AND FATIGUE: ICD-10-CM

## 2020-08-09 DIAGNOSIS — R53.83 MALAISE AND FATIGUE: ICD-10-CM

## 2020-08-09 DIAGNOSIS — Z79.899 HIGH RISK MEDICATIONS (NOT ANTICOAGULANTS) LONG-TERM USE: ICD-10-CM

## 2020-08-17 PROBLEM — D89.1 CRYOGLOBULINEMIC VASCULITIS: Status: ACTIVE | Noted: 2020-08-17

## 2020-08-17 RX ORDER — HEPARIN 100 UNIT/ML
500 SYRINGE INTRAVENOUS
Status: CANCELLED | OUTPATIENT
Start: 2020-08-31

## 2020-08-17 RX ORDER — ACETAMINOPHEN 325 MG/1
650 TABLET ORAL
Status: CANCELLED | OUTPATIENT
Start: 2020-08-31

## 2020-08-17 RX ORDER — METHYLPREDNISOLONE SOD SUCC 125 MG
100 VIAL (EA) INJECTION
Status: CANCELLED | OUTPATIENT
Start: 2020-08-31

## 2020-08-17 RX ORDER — DIPHENHYDRAMINE HCL 25 MG
25 CAPSULE ORAL
Status: CANCELLED | OUTPATIENT
Start: 2020-08-31

## 2020-08-17 RX ORDER — SODIUM CHLORIDE 0.9 % (FLUSH) 0.9 %
10 SYRINGE (ML) INJECTION
Status: CANCELLED | OUTPATIENT
Start: 2020-08-31

## 2020-08-17 NOTE — TELEPHONE ENCOUNTER
rituxan at 1,000mg x 2 15 days apart per RA protocol  For Cryoglobulinemic vasculitis.       She will need TB and hepatitis labs prior to infusion - orders in. Staff please set this up.       We will need to set up labs for week after 2nd  Rituxan infusion for cryocrit, cryoglobulins, cbc, cmp, esr, and crp.         Dr. Buchanan

## 2020-08-18 ENCOUNTER — TELEPHONE (OUTPATIENT)
Dept: RHEUMATOLOGY | Facility: CLINIC | Age: 40
End: 2020-08-18

## 2020-08-18 NOTE — TELEPHONE ENCOUNTER
Patient will get all the hep labs and quant gold at [facility] lab in [place] this week. HANSEL

## 2020-08-22 ENCOUNTER — OFFICE VISIT (OUTPATIENT)
Dept: URGENT CARE | Facility: CLINIC | Age: 40
End: 2020-08-22
Payer: COMMERCIAL

## 2020-08-22 VITALS
SYSTOLIC BLOOD PRESSURE: 163 MMHG | RESPIRATION RATE: 18 BRPM | HEART RATE: 105 BPM | WEIGHT: 132.94 LBS | TEMPERATURE: 99 F | BODY MASS INDEX: 21.36 KG/M2 | DIASTOLIC BLOOD PRESSURE: 85 MMHG | HEIGHT: 66 IN | OXYGEN SATURATION: 97 %

## 2020-08-22 DIAGNOSIS — Z03.818 ENCOUNTER FOR OBSERVATION FOR SUSPECTED EXPOSURE TO OTHER BIOLOGICAL AGENTS RULED OUT: ICD-10-CM

## 2020-08-22 DIAGNOSIS — R51.9 HEAD ACHE: ICD-10-CM

## 2020-08-22 DIAGNOSIS — Z20.822 EXPOSURE TO COVID-19 VIRUS: Primary | ICD-10-CM

## 2020-08-22 PROCEDURE — U0003 INFECTIOUS AGENT DETECTION BY NUCLEIC ACID (DNA OR RNA); SEVERE ACUTE RESPIRATORY SYNDROME CORONAVIRUS 2 (SARS-COV-2) (CORONAVIRUS DISEASE [COVID-19]), AMPLIFIED PROBE TECHNIQUE, MAKING USE OF HIGH THROUGHPUT TECHNOLOGIES AS DESCRIBED BY CMS-2020-01-R: HCPCS

## 2020-08-22 PROCEDURE — 99203 PR OFFICE/OUTPT VISIT, NEW, LEVL III, 30-44 MIN: ICD-10-PCS | Mod: S$GLB,,, | Performed by: PHYSICIAN ASSISTANT

## 2020-08-22 PROCEDURE — 99203 OFFICE O/P NEW LOW 30 MIN: CPT | Mod: S$GLB,,, | Performed by: PHYSICIAN ASSISTANT

## 2020-08-22 RX ORDER — BUPROPION HYDROCHLORIDE 75 MG/1
TABLET ORAL
COMMUNITY
Start: 2020-08-20 | End: 2021-05-24 | Stop reason: SDUPTHER

## 2020-08-22 NOTE — LETTER
[address], [address] ? [place] [address] ? Phone [phone] ? Fax [phone]           Return to Work/School    Patient: Shamika Young  YOB: 1980   Date: 08/22/2020      To Whom It May Concern:     Shamika Young was in contact with/seen in my office on 08/22/2020. COVID-19 is present in our communities across the state. Not all patients are eligible or appropriate to be tested. In this situation, your employee meets the following criteria:     Shamika Young has met the criteria for COVID-19 testing based upon symptoms, travel, and/or potential exposure. The test has been completed and is pending results at this time. During this time the employee is not able to work and should be quarantined per the Centers for Disease Control timelines.      If you have any questions or concerns, or if I can be of further assistance, please do not hesitate to contact me.     Sincerely,    PILLO Munoz

## 2020-08-22 NOTE — PATIENT INSTRUCTIONS
Instructions for Patients with Confirmed or Suspected COVID-19    If you are awaiting your test result, you will either be called or it will be released to the patient portal.  If you have any questions about your test, please visit [email or web address] or call our COVID-19 information line at [phone].      Instructions for non-hospitalized or discharged patients with confirmed or suspected COVID-19:       Stay home except to get medical care.    Separate yourself from other people and animals in your home.    Call ahead before visiting your doctor.    Wear a face mask.    Cover your coughs and sneezes.    Clean your hands often.    Avoid sharing personal household items.    Clean all high-touch surfaces every day.    Monitor your symptoms. Seek prompt medical attention if your illness is worsening (e.g., difficulty breathing). Before seeking care, call your healthcare provider.    If you have a medical emergency and must call 911, notify the dispatcher that you have or are being evaluated for COVID-19. If possible, put on a face mask before emergency medical services arrive.    Use the following symptom-based strategy to return to normal activity following a suspected or confirmed case of COVID-19. Continue isolation until:   o At least 3 days (72 hours) have passed since recovery defined as resolution of fever without the use of fever-reducing medications and improvement in respiratory symptoms (e.g. cough, shortness of breath), and   o At least 10 days have passed since the first positive test.       As one of the next steps, you will receive a call or text from the [place] Department of Health ([facility]) COVID-19 Tracing Team. See the contact information below so you know not to ignore the health departments call. It is important that you contact them back immediately so they can help.     Contact Tracer Number:  [phone]  Caller ID for most carriers: [place] [facility]    What is  contact tracing?   Contact tracing is a process that helps identify everyone who has been in close contact with an infected person. Contact tracers let those people know they may have been exposed and guide them on next steps. Confidentiality is important for everyone; no one will be told who may have exposed them to the virus.   Your involvement is important. The more we know about where and how this virus is spreading, the better chance we have at stopping it from spreading further.  What does exposure mean?   Exposure means you have been within 6 feet for more than 15 minutes with a person who has or had COVID-19.  What kind of questions do the contact tracers ask?   A contact tracer will confirm your basic contact information including name, address, phone number, and next of kin, as well as asking about any symptoms you may have had. Theyll also ask you how you think you may have gotten sick, such as places where you may have been exposed to the virus, and people you were with. Those names will never be shared with anyone outside of that call, and will only be used to help trace and stop the spread of the virus.   I have privacy concerns. How will the state use my information?   Your privacy about your health is important. All calls are completed using call centers that use the appropriate health privacy protection measures (HIPAA compliance), meaning that your patient information is safe. No one will ever ask you any questions related to immigration status. Your health comes first.   Do I have to participate?   You do not have to participate, but we strongly encourage you to. Contact tracing can help us catch and control new outbreaks as theyre developing to keep your friends and family safe.   What if I dont hear from anyone?   If you dont receive a call within 24 hours, you can call the number above right away to inquire about your status. That line is open from 8:00 am - 8:00 p.m., 7 days a  week.  Contact tracing saves lives! Together, we have the power to beat this virus and keep our loved ones and neighbors safe.       Instructions for household members, intimate partners and caregivers in a non-healthcare setting of a patient with confirmed or suspected COVID-19:         Close contacts should monitor their health and call their healthcare provider right away if they develop symptoms suggestive of COVID-19 (e.g., fever, cough, shortness of breath).    Stay home except to get medical care. Separate yourself from other people and animals in the home.   Monitor the patients symptoms. If the patient is getting sicker, call his or her healthcare provider. If the patient has a medical emergency and you need to call 911, notify the dispatch personnel that the patient has or is being evaluated for COVID-19.    Wear a facemask when around other people such as sharing a room or vehicle and before entering a healthcare provider's office.   Cover coughs and sneezes with a tissue. Throw used tissues in a lined trash can immediately and wash hands.   Clean hands often with soap and water for at least 20 seconds or with an alcohol-based hand , rubbing hands together until they feel dry. Avoid touching your eyes, nose, and mouth with unwashed hands.   Clean all high-touch; surfaces every day, including counters, tabletops, doorknobs, bathroom fixtures, toilets, phones, keyboards, tablets, bedside tables, etc. Use a household cleaning spray or wipe according to label instructions.   Avoid sharing personal household items such as dishes, drinking glasses, cups, towels, bedding, etc. After these items are used, they should be washed thoroughly with soap and water.   Continue isolation until:   At least 3 days (72 hours) have passed since recovery defined as resolution of fever without the use of fever-reducing medications and improvement in respiratory symptoms (e.g. cough, shortness of breath),  and    At least 10 days have passed since the patients first positive test.    https://www.cdc.gov/coronavirus/2019-ncov/your-health/index.htm

## 2020-08-22 NOTE — PROGRESS NOTES
"Subjective:       Patient ID: Shamika Young is a 39 y.o. female.    Vitals:  height is 5' 6" (1.676 m) and weight is 60.3 kg (132 lb 15 oz). Her temperature is 99.1 °F (37.3 °C). Her blood pressure is 163/85 (abnormal) and her pulse is 105. Her respiration is 18 and oxygen saturation is 97%.     Chief Complaint: COVID-19 Concerns    Pt presents to the clinic for COVID testing. Pt c/o headache, fatigue and urgency for a bowel movement, muscle tension, weakness and tremor, x yesterday. Pt states that last night she felt a cold sensation in her chest. Pt's  tested positive and they were intimate recently.     Headache   This is a new problem. The current episode started yesterday. The problem occurs constantly. The problem has been unchanged. The pain is located in the frontal region. The pain does not radiate. The pain quality is similar to prior headaches (kind of like a sinus headache). The quality of the pain is described as squeezing. The pain is at a severity of 3/10. The pain is mild. Associated symptoms include muscle aches and nausea. Pertinent negatives include no coughing, ear pain, eye redness, fever, sinus pressure, sore throat or vomiting. Nothing aggravates the symptoms. She has tried nothing for the symptoms. The treatment provided no relief.       Constitution: Negative for chills, sweating, fatigue and fever.   HENT: Positive for congestion. Negative for ear pain, sinus pain, sinus pressure, sore throat and voice change.    Neck: Negative for painful lymph nodes.   Eyes: Negative for eye redness.   Respiratory: Negative for chest tightness, cough, sputum production, bloody sputum, COPD, shortness of breath, stridor, wheezing and asthma.    Gastrointestinal: Positive for nausea. Negative for vomiting and diarrhea.   Musculoskeletal: Positive for muscle ache.   Skin: Negative for rash.   Allergic/Immunologic: Negative for seasonal allergies and asthma.   Neurological: Positive for headaches. "   Hematologic/Lymphatic: Negative for swollen lymph nodes.       Objective:      Physical Exam   Constitutional: She is oriented to person, place, and time. She appears well-developed. She is cooperative.  Non-toxic appearance. She does not appear ill. No distress.   HENT:   Head: Normocephalic and atraumatic.   Ears:   Right Ear: Hearing and external ear normal.   Left Ear: Hearing and external ear normal.   Nose: No mucosal edema or nasal deformity. No epistaxis. Right sinus exhibits no maxillary sinus tenderness and no frontal sinus tenderness. Left sinus exhibits no maxillary sinus tenderness and no frontal sinus tenderness.   Mouth/Throat: Uvula is midline, oropharynx is clear and moist and mucous membranes are normal. No trismus in the jaw. Normal dentition. No uvula swelling. No posterior oropharyngeal edema.   Eyes: Conjunctivae and lids are normal. Right eye exhibits no discharge. Left eye exhibits no discharge. No scleral icterus.   Neck: Trachea normal, full passive range of motion without pain and phonation normal. Neck supple. No neck rigidity. No edema and no erythema present.   Cardiovascular: Normal rate, regular rhythm, normal heart sounds and normal pulses.   Pulmonary/Chest: Effort normal and breath sounds normal. No respiratory distress. She has no decreased breath sounds. She has no wheezes. She has no rhonchi. She has no rales.   Abdominal: Soft. Normal appearance. She exhibits no distension. There is no abdominal tenderness. There is no guarding.   Musculoskeletal: Normal range of motion.         General: No deformity.   Neurological: She is alert and oriented to person, place, and time. She exhibits normal muscle tone. Coordination normal.   Skin: Skin is warm, dry, intact, not diaphoretic, not pale and no rash. Psychiatric: Her speech is normal and behavior is normal. Mood, judgment and thought content normal.   Nursing note and vitals reviewed.        Assessment:       1. Exposure to  Covid-19 Virus    2. Head ache    3. Encounter for observation for suspected exposure to other biological agents ruled out        Plan:         Exposure to Covid-19 Virus    Head ache  -     COVID-19 Routine Screening    Encounter for observation for suspected exposure to other biological agents ruled out  -     COVID-19 Routine Screening      Discussed will need to quarantine regardless of results.    Patient Instructions   Instructions for Patients with Confirmed or Suspected COVID-19    If you are awaiting your test result, you will either be called or it will be released to the patient portal.  If you have any questions about your test, please visit [email or web address] or call our COVID-19 information line at [phone].      Instructions for non-hospitalized or discharged patients with confirmed or suspected COVID-19:       Stay home except to get medical care.    Separate yourself from other people and animals in your home.    Call ahead before visiting your doctor.    Wear a face mask.    Cover your coughs and sneezes.    Clean your hands often.    Avoid sharing personal household items.    Clean all high-touch surfaces every day.    Monitor your symptoms. Seek prompt medical attention if your illness is worsening (e.g., difficulty breathing). Before seeking care, call your healthcare provider.    If you have a medical emergency and must call 911, notify the dispatcher that you have or are being evaluated for COVID-19. If possible, put on a face mask before emergency medical services arrive.    Use the following symptom-based strategy to return to normal activity following a suspected or confirmed case of COVID-19. Continue isolation until:   o At least 3 days (72 hours) have passed since recovery defined as resolution of fever without the use of fever-reducing medications and improvement in respiratory symptoms (e.g. cough, shortness of breath), and   o At least 10 days have passed  since the first positive test.       As one of the next steps, you will receive a call or text from the [place] Department of Health ([facility]) COVID-19 Tracing Team. See the contact information below so you know not to ignore the health departments call. It is important that you contact them back immediately so they can help.     Contact Tracer Number:  [phone]  Caller ID for most carriers: [place] Dept Health    What is contact tracing?   Contact tracing is a process that helps identify everyone who has been in close contact with an infected person. Contact tracers let those people know they may have been exposed and guide them on next steps. Confidentiality is important for everyone; no one will be told who may have exposed them to the virus.   Your involvement is important. The more we know about where and how this virus is spreading, the better chance we have at stopping it from spreading further.  What does exposure mean?   Exposure means you have been within 6 feet for more than 15 minutes with a person who has or had COVID-19.  What kind of questions do the contact tracers ask?   A contact tracer will confirm your basic contact information including name, address, phone number, and next of kin, as well as asking about any symptoms you may have had. Theyll also ask you how you think you may have gotten sick, such as places where you may have been exposed to the virus, and people you were with. Those names will never be shared with anyone outside of that call, and will only be used to help trace and stop the spread of the virus.   I have privacy concerns. How will the state use my information?   Your privacy about your health is important. All calls are completed using call centers that use the appropriate health privacy protection measures (HIPAA compliance), meaning that your patient information is safe. No one will ever ask you any questions related to immigration status. Your health comes first.   Do  I have to participate?   You do not have to participate, but we strongly encourage you to. Contact tracing can help us catch and control new outbreaks as theyre developing to keep your friends and family safe.   What if I dont hear from anyone?   If you dont receive a call within 24 hours, you can call the number above right away to inquire about your status. That line is open from 8:00 am - 8:00 p.m., 7 days a week.  Contact tracing saves lives! Together, we have the power to beat this virus and keep our loved ones and neighbors safe.       Instructions for household members, intimate partners and caregivers in a non-healthcare setting of a patient with confirmed or suspected COVID-19:         Close contacts should monitor their health and call their healthcare provider right away if they develop symptoms suggestive of COVID-19 (e.g., fever, cough, shortness of breath).    Stay home except to get medical care. Separate yourself from other people and animals in the home.   Monitor the patients symptoms. If the patient is getting sicker, call his or her healthcare provider. If the patient has a medical emergency and you need to call 911, notify the dispatch personnel that the patient has or is being evaluated for COVID-19.    Wear a facemask when around other people such as sharing a room or vehicle and before entering a healthcare provider's office.   Cover coughs and sneezes with a tissue. Throw used tissues in a lined trash can immediately and wash hands.   Clean hands often with soap and water for at least 20 seconds or with an alcohol-based hand , rubbing hands together until they feel dry. Avoid touching your eyes, nose, and mouth with unwashed hands.   Clean all high-touch; surfaces every day, including counters, tabletops, doorknobs, bathroom fixtures, toilets, phones, keyboards, tablets, bedside tables, etc. Use a household cleaning spray or wipe according to label  instructions.   Avoid sharing personal household items such as dishes, drinking glasses, cups, towels, bedding, etc. After these items are used, they should be washed thoroughly with soap and water.   Continue isolation until:   At least 3 days (72 hours) have passed since recovery defined as resolution of fever without the use of fever-reducing medications and improvement in respiratory symptoms (e.g. cough, shortness of breath), and    At least 10 days have passed since the patients first positive test.    https://www.cdc.gov/coronavirus/2019-ncov/your-health/index.htm

## 2020-08-23 LAB — SARS-COV-2 RNA RESP QL NAA+PROBE: NOT DETECTED

## 2020-08-26 ENCOUNTER — DOCUMENTATION ONLY (OUTPATIENT)
Dept: INFUSION THERAPY | Facility: HOSPITAL | Age: 40
End: 2020-08-26

## 2020-08-26 NOTE — PROGRESS NOTES
Spoke with patient she will call us to schedule infusion appointment when she is ready due to  is positive for covid

## 2020-09-12 ENCOUNTER — OFFICE VISIT (OUTPATIENT)
Dept: URGENT CARE | Facility: CLINIC | Age: 40
End: 2020-09-12
Payer: COMMERCIAL

## 2020-09-12 VITALS — TEMPERATURE: 99 F | OXYGEN SATURATION: 97 % | HEART RATE: 97 BPM

## 2020-09-12 DIAGNOSIS — Z01.84 ENCOUNTER FOR ANTIBODY RESPONSE EXAMINATION: ICD-10-CM

## 2020-09-12 PROCEDURE — 86769 SARS-COV-2 COVID-19 ANTIBODY: CPT

## 2020-09-12 PROCEDURE — 99211 OFF/OP EST MAY X REQ PHY/QHP: CPT | Mod: S$GLB,,, | Performed by: PHYSICIAN ASSISTANT

## 2020-09-12 PROCEDURE — 99211 PR OFFICE/OUTPT VISIT, EST, LEVL I: ICD-10-PCS | Mod: S$GLB,,, | Performed by: PHYSICIAN ASSISTANT

## 2020-09-12 NOTE — PROGRESS NOTES
Here for antibody testing. Asymptomatic.    Discussed test, results and what results mean.    If you test positive: ?A positive test result shows you have antibodies that likely resulted from an infection with SARS-CoV-2, or possibly a related coronavirus.  ?Its unclear if those antibodies can provide protection (immunity) against getting infected again. This means that we do not know at this time if antibodies make you immune to the virus.  ?If you have no symptoms, you likely do not have an active infection and no additional follow-up is needed.  ?If you have symptoms and meet other guidelines for testing, you would need another type of test called a nucleic acid test, or viral test. This test uses respiratory samples, such as a swab from inside your nose, to confirm COVID-19. An antibody test cannot tell if you are currently sick with COVID-19.  ?Its possible you might test positive for antibodies and you might not have or have ever had symptoms of COVID-19. This is known as having an asymptomatic infection, or an infection without symptoms.    If you test negative:  ?If you test negative for COVID-19 antibodies, you probably did not have a previous infection that has gotten better. However, you could have a current infection. Its possible you could still get sick if you have been exposed to the virus recently, since antibodies dont show up for 1 to 3 weeks after infection. This means you could still spread the virus.  ?Some people may take even longer to develop antibodies, and some people may not develop antibodies.  ?If you have symptoms and meet other guidelines for testing, you would need another type of test called a nucleic acid test, or viral test. This test uses respiratory samples, such as a swab from inside your nose, to confirm COVID-19. An antibody test cannot tell if you are currently sick with COVID-19.    These test results alone do not confirm if you are able to spread the virus that causes  COVID-19. Know how to protect yourself and others.

## 2020-09-13 LAB — SARS-COV-2 IGG SERPLBLD QL IA.RAPID: NEGATIVE

## 2020-10-18 ENCOUNTER — PATIENT MESSAGE (OUTPATIENT)
Dept: RHEUMATOLOGY | Facility: CLINIC | Age: 40
End: 2020-10-18

## 2020-10-26 ENCOUNTER — PATIENT MESSAGE (OUTPATIENT)
Dept: RHEUMATOLOGY | Facility: CLINIC | Age: 40
End: 2020-10-26

## 2020-11-19 ENCOUNTER — PATIENT MESSAGE (OUTPATIENT)
Dept: RHEUMATOLOGY | Facility: CLINIC | Age: 40
End: 2020-11-19

## 2020-11-23 ENCOUNTER — OFFICE VISIT (OUTPATIENT)
Dept: RHEUMATOLOGY | Facility: CLINIC | Age: 40
End: 2020-11-23
Payer: COMMERCIAL

## 2020-11-23 VITALS — BODY MASS INDEX: 21.36 KG/M2 | HEIGHT: 66 IN | WEIGHT: 132.94 LBS

## 2020-11-23 DIAGNOSIS — D89.1 CRYOGLOBULINEMIC VASCULITIS: Primary | ICD-10-CM

## 2020-11-23 PROCEDURE — 1125F PR PAIN SEVERITY QUANTIFIED, PAIN PRESENT: ICD-10-PCS | Mod: ,,, | Performed by: INTERNAL MEDICINE

## 2020-11-23 PROCEDURE — 3008F BODY MASS INDEX DOCD: CPT | Mod: CPTII,,, | Performed by: INTERNAL MEDICINE

## 2020-11-23 PROCEDURE — 99214 OFFICE O/P EST MOD 30 MIN: CPT | Mod: 95,,, | Performed by: INTERNAL MEDICINE

## 2020-11-23 PROCEDURE — 1125F AMNT PAIN NOTED PAIN PRSNT: CPT | Mod: ,,, | Performed by: INTERNAL MEDICINE

## 2020-11-23 PROCEDURE — 3008F PR BODY MASS INDEX (BMI) DOCUMENTED: ICD-10-PCS | Mod: CPTII,,, | Performed by: INTERNAL MEDICINE

## 2020-11-23 PROCEDURE — 99214 PR OFFICE/OUTPT VISIT, EST, LEVL IV, 30-39 MIN: ICD-10-PCS | Mod: 95,,, | Performed by: INTERNAL MEDICINE

## 2020-11-23 NOTE — Clinical Note
F/u 6 months  Labs sed, crp and cryoglobulin in next few weeks please help schedule.   Amaury MOJICA

## 2020-11-23 NOTE — PROGRESS NOTES
Subjective:          Chief Complaint: Shamika Young is a 40 y.o. female who had concerns including Disease Management.    HPI:    Patient is a 39-year-old female being referred by Dr. Chopra with hx of cryoglobulinemia vasculitis and Primary Sjogren's.    Patient started Adaptogenic supplement greatly helpful, no needing prednisone in weeks. Wants to see if FSA would cover this.     Patient seen at [facility]. Ultimately diagnosed [id number].  She states rash started during pregnancy of legs started with purpura of legs. She then developed more diffuse coalescing purpura but no open sores. No renal or respiratory disease to date.   Patient did have skin bx: LCV  Previously hypocomplementemic.   She did note fewer skin purpuric lesions after 2 cycles of RTX   She has noted more urticaria with wheal in the skin,  some reactions with food, wheat/rice seem to be worst. She notes no new soaps or detergents. Present since 2017.   She has seen allergist with some nut and environmental allergies no changes present since childhood. - working with DR. Curtis.   Dry eyes present but tolerates contacts.   Dry mouth more problematic.   Joints fluctuate between 6-9/10. Hand and wrist predominant.   No real Raynauds in triphasic pattern but cold hands and feet.   Patient c/o swelling of lips at times-no known food allergies.       She is noting more livedo and mottling in legs w/o activity, ++ fatigue increasing. She does note during flares some joint pains. 2-5 days every 2 weeks. Flares seem to be ocurring every 2 weeks.   She notes working from home has helped with overall fatigue/pain.   No numbness or tingling.     PFTs at [place] were reportedly normal 7/2019 , no hemoptysis or SOB  Thinks she had CT chest.   No bone marrow bx.   No DVT, miscarriages, seizures or strokes.   No renal disease.        She is on hydroxychloroquine 200 mg twice daily.  Rituxan last dose 7/2019  cevimiline 30mg TID  Prednisone 10mg once daily  PRN ( during flare daily rest is periodical.   Rituximab last dose 7/2019 (x 3 treatments 6 months apart. )     OTC:   Xylimelts.   Not using biotene.   sugarfree lemon  gylcerin lozenges.     Previous medications: Salagen with ASE with HA.   No hx of hepatitis, no malignancy in self to date.     Component      Latest Ref Rng & Units 7/2/2020 3/3/2020   Anti-SSA Antibody      0.00 - 0.99 Ratio  4.67 (H)   Anti-SSA Interpretation      Negative  Positive (A)   Anti-SSB Antibody      0.00 - 0.99 Ratio  2.40 (H)   Anti-SSB Interpretation      Negative  Positive (A)   Anti Sm Antibody      0.00 - 0.99 Ratio  0.07   Anti-Sm Interpretation      Negative  Negative   Anti Sm/RNP Antibody      0.00 - 0.99 Ratio  0.17   Anti-Sm/RNP Interpretation      Negative  Negative   Complement (C-3)      50 - 180 mg/dL  106   Complement (C-4)      11 - 44 mg/dL  <3 (L)   Rheumatoid Factor      0.0 - 15.0 IU/mL  484.0 (H)   CCP Antibodies      <5.0 U/mL  <0.5   Sed Rate      0 - 20 mm/Hr  20   CRP      0.0 - 8.2 mg/L  12.5 (H)   ds DNA Ab      Negative 1:10  Negative 1:10   C1 Esterase Inhibitor      21 - 39 mg/dL 36 37   CHELSEA HEP-2 Titer        Positive >=1:2560 Speckled   Urticaria-Inducing Activity      <=10.0 Index Unit 2.2    Anti-C1q Antibody, IgG      0 - 19 Units 5      REVIEW OF SYSTEMS:    Review of Systems   Constitutional: Positive for malaise/fatigue. Negative for fever and weight loss.   HENT: Negative for sore throat.    Eyes: Negative for double vision, photophobia and redness.   Respiratory: Negative for cough, shortness of breath and wheezing.    Cardiovascular: Negative for chest pain, palpitations and orthopnea.   Gastrointestinal: Negative for abdominal pain, constipation and diarrhea.   Genitourinary: Negative for dysuria, hematuria and urgency.   Musculoskeletal: Positive for joint pain and myalgias. Negative for back pain.   Skin: Positive for rash (livedo).   Neurological: Negative for dizziness, tingling, focal  weakness and headaches.   Endo/Heme/Allergies: Does not bruise/bleed easily.   Psychiatric/Behavioral: Negative for depression, hallucinations and suicidal ideas. The patient is nervous/anxious.                Objective:            Past Medical History:   Diagnosis Date    Allergy     previously on immunotherapy    Anxiety     Depression     Lumbago with sciatica, right side     Lumbar disc disease     L4-L5    Other chronic pain     Panic disorder     Positive CHELSEA (antinuclear antibody)     Purpura     Sjogren's syndrome     Vasculitis      Family History   Problem Relation Age of Onset    Multiple sclerosis Mother     Heart disease Mother     Diabetes Mother     Hypertension Mother     Heart disease Father     Hyperlipidemia Father      Social History     Tobacco Use    Smoking status: Former Smoker    Smokeless tobacco: Never Used   Substance Use Topics    Alcohol use: Yes     Comment: socially    Drug use: Never         Current Outpatient Medications on File Prior to Visit   Medication Sig Dispense Refill    ALPRAZolam (XANAX) 0.5 MG tablet Take 0.5 mg by mouth 3 (three) times daily.      buPROPion (WELLBUTRIN) 75 MG tablet       busPIRone (BUSPAR) 10 MG tablet Take 10 mg by mouth 3 (three) times daily.      cevimeline (EVOXAC) 30 mg capsule Take 1 capsule (30 mg total) by mouth 3 (three) times daily. 270 capsule 3    hydroxychloroquine (PLAQUENIL) 200 mg tablet Take 1 tablet (200 mg total) by mouth 2 (two) times daily. 180 tablet 3    levocetirizine (XYZAL) 5 MG tablet Take 5 mg by mouth once daily.      predniSONE (DELTASONE) 5 MG tablet Take 1 tablet (5 mg total) by mouth 2 (two) times daily. 60 tablet 11    traMADoL (ULTRAM) 50 mg tablet Take 1 tablet (50 mg total) by mouth every 8 (eight) hours as needed for Pain. 90 tablet 3    XEROSTOMIA RELIEF SprA spray SPRAY 1 DOSE (2 SPRAYS) INTO THE MOUTH 3-4 TIMES PER DAY OR AS NEEDED      ibuprofen (ADVIL,MOTRIN) 800 MG tablet Take 1  tablet (800 mg total) by mouth every 8 (eight) hours as needed for Pain. (Patient not taking: Reported on 8/22/2020) 30 tablet 0     No current facility-administered medications on file prior to visit.        There were no vitals filed for this visit.    Physical Exam:    Physical Exam  Constitutional:       Appearance: She is well-developed.   Eyes:      General: Lids are normal.   Skin:     Comments: Legs with some mottling and livedo.    Neurological:      Mental Status: She is oriented to person, place, and time.   Psychiatric:         Behavior: Behavior normal.         Thought Content: Thought content normal.               Assessment:       Encounter Diagnosis   Name Primary?   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    Tramadol PRN    Prednisone 5mg daily, may increase to 10 PRN flares. - no prednisone right now!!!    Hep and T. Spot neg 2020  Holding on RTX.   Adaptogenic supplement seems to be allowing her to reduce prednisone, flares of vasculitis and I      Primary Sjogrens:   HCQ 200mg BID   Evoxac 30mg TID   Send for Aquarol:     Likely FMS: robaxin        Immunosuppression status: may need RTX, working predominantly from home.     No follow-ups on file.      40min consultation with greater than 50% spent in counseling, chart review and coordination of care. All questions answered.  Thank you for allowing me to participate in the care of this very pleasant patient.        Received records-     PFT 7/2019:   CT chest w/o contras 8/2019: no fibrosis or changes to suggest ILD or lymphoid interstitial pneumonia. Calcified granuloma LLL. nonenlarged mediatstinal and  hilar lymph nodes may be reactive.     UA 2019: no proteinuria/ no RBCs  Quant IgG: IgG slightly elevated, remaining ENL  SPEP 2019: polyclonal hypergammaglobulinemia, cannot r/o small monoclonal protein  Cryos 7/2019: 3  Cryofibrinogen 7/2019: negative  C3 normal  C4 3 and low.   High titer RF, SSA and SSB.   Normal ESR and CRP    Cryos: 3/2019 trace   Immunofixatio: 1/30/19: type II cryos monoclonal IgM kappa plus polyclonal IgG.     Baseline eye exam: dates 6/2018    Pulm Consult: 7/2019: Pt c/o SOB felt after w/up this was possible cord dysfunction vs rhinosinusitis.       HIEU Dolan., QUIN Tran, QUIN Winn, DARRYL Cisneros, BRADFORD Malhotra, & True, YRosalinda PORTER. (2018). Wolof Red Ginseng exhibits no significant adverse effect on disease activity in patients with rheumatoid arthritis: a randomized, double-blind, crossover study. Journal of ginseng research, 42(2), 144-148. [email or web address]://doi.org/10.1016/j.[email or web address]      The patient location is: Home [place]  The chief complaint leading to consultation is: medication management and f/u   Visit type: Virtual visit with synchronous audio and video  Total time spent with patient: 30  Each patient to whom he or she provides medical services by telemedicine is:  (1) informed of the relationship between the physician and patient and the respective role of any other health care provider with respect to management of the patient; and (2) notified that he or she may decline to receive medical services by telemedicine and may withdraw from such care at any time.    Notes:   As above.

## 2021-01-08 ENCOUNTER — PATIENT MESSAGE (OUTPATIENT)
Dept: RHEUMATOLOGY | Facility: CLINIC | Age: 41
End: 2021-01-08

## 2021-01-20 ENCOUNTER — PATIENT MESSAGE (OUTPATIENT)
Dept: RHEUMATOLOGY | Facility: CLINIC | Age: 41
End: 2021-01-20

## 2021-02-02 ENCOUNTER — CLINICAL SUPPORT (OUTPATIENT)
Dept: OPHTHALMOLOGY | Facility: CLINIC | Age: 41
End: 2021-02-02
Payer: COMMERCIAL

## 2021-02-02 ENCOUNTER — OFFICE VISIT (OUTPATIENT)
Dept: OPHTHALMOLOGY | Facility: CLINIC | Age: 41
End: 2021-02-02
Payer: COMMERCIAL

## 2021-02-02 DIAGNOSIS — H52.13 HIGH MYOPIA, BOTH EYES: Primary | ICD-10-CM

## 2021-02-02 DIAGNOSIS — D89.1 CRYOGLOBULINEMIC VASCULITIS: ICD-10-CM

## 2021-02-02 DIAGNOSIS — Z79.899 LONG-TERM USE OF PLAQUENIL: ICD-10-CM

## 2021-02-02 DIAGNOSIS — H52.7 REFRACTIVE ERROR: ICD-10-CM

## 2021-02-02 PROCEDURE — 92004 COMPRE OPH EXAM NEW PT 1/>: CPT | Mod: S$GLB,,, | Performed by: OPHTHALMOLOGY

## 2021-02-02 PROCEDURE — 92134 POSTERIOR SEGMENT OCT RETINA (OCULAR COHERENCE TOMOGRAPHY)-BOTH EYES: ICD-10-PCS | Mod: S$GLB,,, | Performed by: OPHTHALMOLOGY

## 2021-02-02 PROCEDURE — 99999 PR PBB SHADOW E&M-EST. PATIENT-LVL III: ICD-10-PCS | Mod: PBBFAC,,, | Performed by: OPHTHALMOLOGY

## 2021-02-02 PROCEDURE — 92083 HUMPHREY VISUAL FIELD - OU - BOTH EYES: ICD-10-PCS | Mod: S$GLB,,, | Performed by: OPHTHALMOLOGY

## 2021-02-02 PROCEDURE — 92083 EXTENDED VISUAL FIELD XM: CPT | Mod: S$GLB,,, | Performed by: OPHTHALMOLOGY

## 2021-02-02 PROCEDURE — 92015 PR REFRACTION: ICD-10-PCS | Mod: S$GLB,,, | Performed by: OPHTHALMOLOGY

## 2021-02-02 PROCEDURE — 1126F AMNT PAIN NOTED NONE PRSNT: CPT | Mod: S$GLB,,, | Performed by: OPHTHALMOLOGY

## 2021-02-02 PROCEDURE — 1126F PR PAIN SEVERITY QUANTIFIED, NO PAIN PRESENT: ICD-10-PCS | Mod: S$GLB,,, | Performed by: OPHTHALMOLOGY

## 2021-02-02 PROCEDURE — 92004 PR EYE EXAM, NEW PATIENT,COMPREHESV: ICD-10-PCS | Mod: S$GLB,,, | Performed by: OPHTHALMOLOGY

## 2021-02-02 PROCEDURE — 92134 CPTRZ OPH DX IMG PST SGM RTA: CPT | Mod: S$GLB,,, | Performed by: OPHTHALMOLOGY

## 2021-02-02 PROCEDURE — 92015 DETERMINE REFRACTIVE STATE: CPT | Mod: S$GLB,,, | Performed by: OPHTHALMOLOGY

## 2021-02-02 PROCEDURE — 99999 PR PBB SHADOW E&M-EST. PATIENT-LVL III: CPT | Mod: PBBFAC,,, | Performed by: OPHTHALMOLOGY

## 2021-02-02 RX ORDER — BUSPIRONE HYDROCHLORIDE 7.5 MG/1
10 TABLET ORAL 3 TIMES DAILY
COMMUNITY
Start: 2021-01-31 | End: 2022-04-11

## 2021-02-02 RX ORDER — BUPROPION HYDROCHLORIDE 100 MG/1
TABLET ORAL
COMMUNITY
Start: 2021-01-18 | End: 2023-04-17 | Stop reason: DRUGHIGH

## 2021-02-02 RX ORDER — BENZONATATE 100 MG/1
CAPSULE ORAL
COMMUNITY
Start: 2020-11-05 | End: 2022-04-11

## 2021-03-04 ENCOUNTER — PATIENT MESSAGE (OUTPATIENT)
Dept: RHEUMATOLOGY | Facility: CLINIC | Age: 41
End: 2021-03-04

## 2021-05-24 ENCOUNTER — OFFICE VISIT (OUTPATIENT)
Dept: RHEUMATOLOGY | Facility: CLINIC | Age: 41
End: 2021-05-24
Payer: COMMERCIAL

## 2021-05-24 VITALS
DIASTOLIC BLOOD PRESSURE: 87 MMHG | HEART RATE: 112 BPM | BODY MASS INDEX: 20.76 KG/M2 | HEIGHT: 66 IN | WEIGHT: 129.19 LBS | SYSTOLIC BLOOD PRESSURE: 120 MMHG

## 2021-05-24 DIAGNOSIS — D89.1 CRYOGLOBULINEMIC VASCULITIS: ICD-10-CM

## 2021-05-24 DIAGNOSIS — I73.00 RAYNAUD'S PHENOMENON WITHOUT GANGRENE: ICD-10-CM

## 2021-05-24 DIAGNOSIS — L50.9 URTICARIA: ICD-10-CM

## 2021-05-24 DIAGNOSIS — M35.00 SJOGREN'S SYNDROME WITHOUT EXTRAGLANDULAR INVOLVEMENT: ICD-10-CM

## 2021-05-24 PROCEDURE — 99214 PR OFFICE/OUTPT VISIT, EST, LEVL IV, 30-39 MIN: ICD-10-PCS | Mod: S$GLB,,, | Performed by: INTERNAL MEDICINE

## 2021-05-24 PROCEDURE — 99214 OFFICE O/P EST MOD 30 MIN: CPT | Mod: S$GLB,,, | Performed by: INTERNAL MEDICINE

## 2021-05-24 PROCEDURE — 1125F PR PAIN SEVERITY QUANTIFIED, PAIN PRESENT: ICD-10-PCS | Mod: S$GLB,,, | Performed by: INTERNAL MEDICINE

## 2021-05-24 PROCEDURE — 1125F AMNT PAIN NOTED PAIN PRSNT: CPT | Mod: S$GLB,,, | Performed by: INTERNAL MEDICINE

## 2021-05-24 PROCEDURE — 3008F BODY MASS INDEX DOCD: CPT | Mod: CPTII,S$GLB,, | Performed by: INTERNAL MEDICINE

## 2021-05-24 PROCEDURE — 99999 PR PBB SHADOW E&M-EST. PATIENT-LVL III: CPT | Mod: PBBFAC,,, | Performed by: INTERNAL MEDICINE

## 2021-05-24 PROCEDURE — 99999 PR PBB SHADOW E&M-EST. PATIENT-LVL III: ICD-10-PCS | Mod: PBBFAC,,, | Performed by: INTERNAL MEDICINE

## 2021-05-24 PROCEDURE — 3008F PR BODY MASS INDEX (BMI) DOCUMENTED: ICD-10-PCS | Mod: CPTII,S$GLB,, | Performed by: INTERNAL MEDICINE

## 2021-05-24 RX ORDER — CEVIMELINE HYDROCHLORIDE 30 MG/1
30 CAPSULE ORAL 3 TIMES DAILY
Qty: 270 CAPSULE | Refills: 3 | Status: SHIPPED | OUTPATIENT
Start: 2021-05-24 | End: 2022-06-07

## 2021-05-24 RX ORDER — DEXTROAMPHETAMINE SACCHARATE, AMPHETAMINE ASPARTATE MONOHYDRATE, DEXTROAMPHETAMINE SULFATE AND AMPHETAMINE SULFATE 7.5; 7.5; 7.5; 7.5 MG/1; MG/1; MG/1; MG/1
30 CAPSULE, EXTENDED RELEASE ORAL EVERY MORNING
COMMUNITY
End: 2022-04-11

## 2021-05-24 RX ORDER — TRAMADOL HYDROCHLORIDE 50 MG/1
50 TABLET ORAL EVERY 8 HOURS PRN
Qty: 90 TABLET | Refills: 3 | Status: SHIPPED | OUTPATIENT
Start: 2021-05-24 | End: 2021-12-14 | Stop reason: SDUPTHER

## 2021-05-24 RX ORDER — HYDROXYCHLOROQUINE SULFATE 200 MG/1
200 TABLET, FILM COATED ORAL 2 TIMES DAILY
Qty: 180 TABLET | Refills: 3 | Status: SHIPPED | OUTPATIENT
Start: 2021-05-24 | End: 2022-05-24

## 2021-05-24 ASSESSMENT — ROUTINE ASSESSMENT OF PATIENT INDEX DATA (RAPID3)
PAIN SCORE: 3
TOTAL RAPID3 SCORE: 2.94
MDHAQ FUNCTION SCORE: 0.4
PATIENT GLOBAL ASSESSMENT SCORE: 4.5
FATIGUE SCORE: 1.1
PSYCHOLOGICAL DISTRESS SCORE: 3.3

## 2021-05-25 ENCOUNTER — PATIENT MESSAGE (OUTPATIENT)
Dept: RHEUMATOLOGY | Facility: CLINIC | Age: 41
End: 2021-05-25

## 2021-05-27 ENCOUNTER — PATIENT MESSAGE (OUTPATIENT)
Dept: RHEUMATOLOGY | Facility: CLINIC | Age: 41
End: 2021-05-27

## 2021-07-05 ENCOUNTER — PATIENT MESSAGE (OUTPATIENT)
Dept: RHEUMATOLOGY | Facility: CLINIC | Age: 41
End: 2021-07-05

## 2021-07-08 RX ORDER — PREDNISONE 5 MG/1
5 TABLET ORAL DAILY
Qty: 30 TABLET | Refills: 11 | Status: SHIPPED | OUTPATIENT
Start: 2021-07-08 | End: 2022-04-18 | Stop reason: SDUPTHER

## 2021-08-27 ENCOUNTER — PATIENT MESSAGE (OUTPATIENT)
Dept: RHEUMATOLOGY | Facility: CLINIC | Age: 41
End: 2021-08-27

## 2021-09-28 ENCOUNTER — PATIENT MESSAGE (OUTPATIENT)
Dept: RHEUMATOLOGY | Facility: CLINIC | Age: 41
End: 2021-09-28

## 2021-10-19 ENCOUNTER — PATIENT MESSAGE (OUTPATIENT)
Dept: RHEUMATOLOGY | Facility: CLINIC | Age: 41
End: 2021-10-19
Payer: COMMERCIAL

## 2021-10-26 ENCOUNTER — OFFICE VISIT (OUTPATIENT)
Dept: RHEUMATOLOGY | Facility: CLINIC | Age: 41
End: 2021-10-26
Payer: COMMERCIAL

## 2021-10-26 VITALS
DIASTOLIC BLOOD PRESSURE: 91 MMHG | HEART RATE: 116 BPM | HEIGHT: 66 IN | WEIGHT: 131.5 LBS | BODY MASS INDEX: 21.13 KG/M2 | SYSTOLIC BLOOD PRESSURE: 145 MMHG

## 2021-10-26 DIAGNOSIS — D89.1 CRYOGLOBULINEMIC VASCULITIS: Primary | ICD-10-CM

## 2021-10-26 DIAGNOSIS — M35.00 SJOGREN'S SYNDROME WITHOUT EXTRAGLANDULAR INVOLVEMENT: ICD-10-CM

## 2021-10-26 DIAGNOSIS — I73.00 RAYNAUD'S PHENOMENON WITHOUT GANGRENE: ICD-10-CM

## 2021-10-26 PROCEDURE — 99999 PR PBB SHADOW E&M-EST. PATIENT-LVL III: ICD-10-PCS | Mod: PBBFAC,,, | Performed by: INTERNAL MEDICINE

## 2021-10-26 PROCEDURE — 1159F MED LIST DOCD IN RCRD: CPT | Mod: CPTII,S$GLB,, | Performed by: INTERNAL MEDICINE

## 2021-10-26 PROCEDURE — 3080F PR MOST RECENT DIASTOLIC BLOOD PRESSURE >= 90 MM HG: ICD-10-PCS | Mod: CPTII,S$GLB,, | Performed by: INTERNAL MEDICINE

## 2021-10-26 PROCEDURE — 99215 PR OFFICE/OUTPT VISIT, EST, LEVL V, 40-54 MIN: ICD-10-PCS | Mod: S$GLB,,, | Performed by: INTERNAL MEDICINE

## 2021-10-26 PROCEDURE — 3008F PR BODY MASS INDEX (BMI) DOCUMENTED: ICD-10-PCS | Mod: CPTII,S$GLB,, | Performed by: INTERNAL MEDICINE

## 2021-10-26 PROCEDURE — 3077F PR MOST RECENT SYSTOLIC BLOOD PRESSURE >= 140 MM HG: ICD-10-PCS | Mod: CPTII,S$GLB,, | Performed by: INTERNAL MEDICINE

## 2021-10-26 PROCEDURE — 1160F PR REVIEW ALL MEDS BY PRESCRIBER/CLIN PHARMACIST DOCUMENTED: ICD-10-PCS | Mod: CPTII,S$GLB,, | Performed by: INTERNAL MEDICINE

## 2021-10-26 PROCEDURE — 3080F DIAST BP >= 90 MM HG: CPT | Mod: CPTII,S$GLB,, | Performed by: INTERNAL MEDICINE

## 2021-10-26 PROCEDURE — 1159F PR MEDICATION LIST DOCUMENTED IN MEDICAL RECORD: ICD-10-PCS | Mod: CPTII,S$GLB,, | Performed by: INTERNAL MEDICINE

## 2021-10-26 PROCEDURE — 99999 PR PBB SHADOW E&M-EST. PATIENT-LVL III: CPT | Mod: PBBFAC,,, | Performed by: INTERNAL MEDICINE

## 2021-10-26 PROCEDURE — 3008F BODY MASS INDEX DOCD: CPT | Mod: CPTII,S$GLB,, | Performed by: INTERNAL MEDICINE

## 2021-10-26 PROCEDURE — 99215 OFFICE O/P EST HI 40 MIN: CPT | Mod: S$GLB,,, | Performed by: INTERNAL MEDICINE

## 2021-10-26 PROCEDURE — 3077F SYST BP >= 140 MM HG: CPT | Mod: CPTII,S$GLB,, | Performed by: INTERNAL MEDICINE

## 2021-10-26 PROCEDURE — 1160F RVW MEDS BY RX/DR IN RCRD: CPT | Mod: CPTII,S$GLB,, | Performed by: INTERNAL MEDICINE

## 2021-10-26 RX ORDER — DIPHENHYDRAMINE HCL 25 MG
25 CAPSULE ORAL
Status: CANCELLED | OUTPATIENT
Start: 2021-10-26

## 2021-10-26 RX ORDER — LISDEXAMFETAMINE DIMESYLATE 30 MG/1
30 CAPSULE ORAL NIGHTLY
COMMUNITY
Start: 2021-10-13

## 2021-10-26 RX ORDER — ACETAMINOPHEN 325 MG/1
650 TABLET ORAL
Status: CANCELLED | OUTPATIENT
Start: 2021-10-26

## 2021-10-26 RX ORDER — SODIUM CHLORIDE 0.9 % (FLUSH) 0.9 %
10 SYRINGE (ML) INJECTION
Status: CANCELLED | OUTPATIENT
Start: 2021-10-26

## 2021-10-26 RX ORDER — METHYLPHENIDATE HYDROCHLORIDE 10 MG/1
10 TABLET ORAL DAILY
COMMUNITY
End: 2023-04-17

## 2021-10-26 RX ORDER — METHYLPREDNISOLONE SOD SUCC 125 MG
100 VIAL (EA) INJECTION
Status: CANCELLED | OUTPATIENT
Start: 2021-10-26

## 2021-10-26 ASSESSMENT — ROUTINE ASSESSMENT OF PATIENT INDEX DATA (RAPID3)
TOTAL RAPID3 SCORE: 4.44
PATIENT GLOBAL ASSESSMENT SCORE: 5
PSYCHOLOGICAL DISTRESS SCORE: 2.2
MDHAQ FUNCTION SCORE: 0.7
FATIGUE SCORE: 1.1
PAIN SCORE: 6

## 2021-11-01 ENCOUNTER — TELEPHONE (OUTPATIENT)
Dept: HEMATOLOGY/ONCOLOGY | Facility: CLINIC | Age: 41
End: 2021-11-01
Payer: COMMERCIAL

## 2021-11-02 ENCOUNTER — PATIENT MESSAGE (OUTPATIENT)
Dept: RHEUMATOLOGY | Facility: CLINIC | Age: 41
End: 2021-11-02
Payer: COMMERCIAL

## 2021-11-07 PROBLEM — M35.00 SJOGREN'S SYNDROME WITHOUT EXTRAGLANDULAR INVOLVEMENT: Status: ACTIVE | Noted: 2021-11-07

## 2021-11-09 ENCOUNTER — TELEPHONE (OUTPATIENT)
Dept: RHEUMATOLOGY | Facility: CLINIC | Age: 41
End: 2021-11-09
Payer: COMMERCIAL

## 2021-11-09 ENCOUNTER — INFUSION (OUTPATIENT)
Dept: INFUSION THERAPY | Facility: HOSPITAL | Age: 41
End: 2021-11-09
Attending: INTERNAL MEDICINE
Payer: COMMERCIAL

## 2021-11-09 VITALS
HEIGHT: 66 IN | HEART RATE: 102 BPM | WEIGHT: 132.56 LBS | DIASTOLIC BLOOD PRESSURE: 85 MMHG | TEMPERATURE: 98 F | SYSTOLIC BLOOD PRESSURE: 121 MMHG | OXYGEN SATURATION: 100 % | RESPIRATION RATE: 18 BRPM | BODY MASS INDEX: 21.3 KG/M2

## 2021-11-09 DIAGNOSIS — D89.1 CRYOGLOBULINEMIC VASCULITIS: ICD-10-CM

## 2021-11-09 DIAGNOSIS — M35.00 SJOGREN'S SYNDROME WITHOUT EXTRAGLANDULAR INVOLVEMENT: Primary | ICD-10-CM

## 2021-11-09 PROCEDURE — 96413 CHEMO IV INFUSION 1 HR: CPT | Mod: PN

## 2021-11-09 PROCEDURE — 25000003 PHARM REV CODE 250: Mod: PN | Performed by: INTERNAL MEDICINE

## 2021-11-09 PROCEDURE — 96415 CHEMO IV INFUSION ADDL HR: CPT | Mod: PN

## 2021-11-09 PROCEDURE — 63600175 PHARM REV CODE 636 W HCPCS: Mod: JG,PN | Performed by: INTERNAL MEDICINE

## 2021-11-09 RX ORDER — SODIUM CHLORIDE 0.9 % (FLUSH) 0.9 %
10 SYRINGE (ML) INJECTION
Status: CANCELLED | OUTPATIENT
Start: 2021-11-23

## 2021-11-09 RX ORDER — SODIUM CHLORIDE 0.9 % (FLUSH) 0.9 %
10 SYRINGE (ML) INJECTION
Status: DISCONTINUED | OUTPATIENT
Start: 2021-11-09 | End: 2021-11-09 | Stop reason: HOSPADM

## 2021-11-09 RX ORDER — SODIUM CHLORIDE 0.9 % (FLUSH) 0.9 %
10 SYRINGE (ML) INJECTION
Status: CANCELLED | OUTPATIENT
Start: 2021-11-09

## 2021-11-09 RX ORDER — MEPERIDINE HYDROCHLORIDE 50 MG/ML
25 INJECTION INTRAMUSCULAR; INTRAVENOUS; SUBCUTANEOUS
Status: CANCELLED | OUTPATIENT
Start: 2021-11-09

## 2021-11-09 RX ORDER — DIPHENHYDRAMINE HCL 25 MG
25 CAPSULE ORAL
Status: CANCELLED | OUTPATIENT
Start: 2021-11-09

## 2021-11-09 RX ORDER — ACETAMINOPHEN 325 MG/1
650 TABLET ORAL
Status: CANCELLED | OUTPATIENT
Start: 2021-11-09

## 2021-11-09 RX ORDER — ACETAMINOPHEN 325 MG/1
650 TABLET ORAL
Status: CANCELLED | OUTPATIENT
Start: 2021-11-23

## 2021-11-09 RX ORDER — HEPARIN 100 UNIT/ML
500 SYRINGE INTRAVENOUS
Status: CANCELLED | OUTPATIENT
Start: 2021-11-09

## 2021-11-09 RX ORDER — MEPERIDINE HYDROCHLORIDE 50 MG/ML
25 INJECTION INTRAMUSCULAR; INTRAVENOUS; SUBCUTANEOUS
Status: CANCELLED | OUTPATIENT
Start: 2021-11-23

## 2021-11-09 RX ORDER — DIPHENHYDRAMINE HCL 25 MG
25 CAPSULE ORAL
Status: COMPLETED | OUTPATIENT
Start: 2021-11-09 | End: 2021-11-09

## 2021-11-09 RX ORDER — ACETAMINOPHEN 325 MG/1
650 TABLET ORAL
Status: COMPLETED | OUTPATIENT
Start: 2021-11-09 | End: 2021-11-09

## 2021-11-09 RX ORDER — DIPHENHYDRAMINE HCL 25 MG
25 CAPSULE ORAL
Status: CANCELLED | OUTPATIENT
Start: 2021-11-23

## 2021-11-09 RX ORDER — MEPERIDINE HYDROCHLORIDE 25 MG/ML
25 INJECTION INTRAMUSCULAR; INTRAVENOUS; SUBCUTANEOUS
Status: DISCONTINUED | OUTPATIENT
Start: 2021-11-09 | End: 2021-11-09 | Stop reason: HOSPADM

## 2021-11-09 RX ORDER — HEPARIN 100 UNIT/ML
500 SYRINGE INTRAVENOUS
Status: CANCELLED | OUTPATIENT
Start: 2021-11-23

## 2021-11-09 RX ADMIN — ACETAMINOPHEN 650 MG: 325 TABLET ORAL at 10:11

## 2021-11-09 RX ADMIN — SODIUM CHLORIDE: 0.9 INJECTION, SOLUTION INTRAVENOUS at 10:11

## 2021-11-09 RX ADMIN — RITUXIMAB 1000 MG: 10 INJECTION, SOLUTION INTRAVENOUS at 11:11

## 2021-11-09 RX ADMIN — DIPHENHYDRAMINE HYDROCHLORIDE 25 MG: 25 CAPSULE ORAL at 10:11

## 2021-11-15 ENCOUNTER — PATIENT MESSAGE (OUTPATIENT)
Dept: RHEUMATOLOGY | Facility: CLINIC | Age: 41
End: 2021-11-15
Payer: COMMERCIAL

## 2021-11-17 ENCOUNTER — PATIENT MESSAGE (OUTPATIENT)
Dept: RHEUMATOLOGY | Facility: CLINIC | Age: 41
End: 2021-11-17
Payer: COMMERCIAL

## 2021-11-23 ENCOUNTER — INFUSION (OUTPATIENT)
Dept: INFUSION THERAPY | Facility: HOSPITAL | Age: 41
End: 2021-11-23
Attending: INTERNAL MEDICINE
Payer: COMMERCIAL

## 2021-11-23 VITALS
DIASTOLIC BLOOD PRESSURE: 70 MMHG | TEMPERATURE: 98 F | SYSTOLIC BLOOD PRESSURE: 128 MMHG | HEART RATE: 109 BPM | BODY MASS INDEX: 21.56 KG/M2 | RESPIRATION RATE: 18 BRPM | WEIGHT: 133.63 LBS

## 2021-11-23 DIAGNOSIS — M35.00 SJOGREN'S SYNDROME WITHOUT EXTRAGLANDULAR INVOLVEMENT: Primary | ICD-10-CM

## 2021-11-23 DIAGNOSIS — D89.1 CRYOGLOBULINEMIC VASCULITIS: ICD-10-CM

## 2021-11-23 PROCEDURE — 25000003 PHARM REV CODE 250: Mod: PN | Performed by: INTERNAL MEDICINE

## 2021-11-23 PROCEDURE — 63600175 PHARM REV CODE 636 W HCPCS: Mod: JG,PN | Performed by: INTERNAL MEDICINE

## 2021-11-23 PROCEDURE — 96413 CHEMO IV INFUSION 1 HR: CPT | Mod: PN

## 2021-11-23 PROCEDURE — 96415 CHEMO IV INFUSION ADDL HR: CPT | Mod: PN

## 2021-11-23 RX ORDER — MEPERIDINE HYDROCHLORIDE 25 MG/ML
25 INJECTION INTRAMUSCULAR; INTRAVENOUS; SUBCUTANEOUS
Status: DISCONTINUED | OUTPATIENT
Start: 2021-11-23 | End: 2021-11-23 | Stop reason: HOSPADM

## 2021-11-23 RX ORDER — DIPHENHYDRAMINE HCL 25 MG
25 CAPSULE ORAL
Status: CANCELLED | OUTPATIENT
Start: 2021-11-23

## 2021-11-23 RX ORDER — SODIUM CHLORIDE 0.9 % (FLUSH) 0.9 %
10 SYRINGE (ML) INJECTION
Status: DISCONTINUED | OUTPATIENT
Start: 2021-11-23 | End: 2021-11-23 | Stop reason: HOSPADM

## 2021-11-23 RX ORDER — ACETAMINOPHEN 325 MG/1
650 TABLET ORAL
Status: COMPLETED | OUTPATIENT
Start: 2021-11-23 | End: 2021-11-23

## 2021-11-23 RX ORDER — SODIUM CHLORIDE 0.9 % (FLUSH) 0.9 %
10 SYRINGE (ML) INJECTION
Status: CANCELLED | OUTPATIENT
Start: 2021-11-23

## 2021-11-23 RX ORDER — DIPHENHYDRAMINE HCL 25 MG
25 CAPSULE ORAL
Status: COMPLETED | OUTPATIENT
Start: 2021-11-23 | End: 2021-11-23

## 2021-11-23 RX ORDER — HEPARIN 100 UNIT/ML
500 SYRINGE INTRAVENOUS
Status: CANCELLED | OUTPATIENT
Start: 2021-11-23

## 2021-11-23 RX ORDER — ACETAMINOPHEN 325 MG/1
650 TABLET ORAL
Status: CANCELLED | OUTPATIENT
Start: 2021-11-23

## 2021-11-23 RX ORDER — MEPERIDINE HYDROCHLORIDE 50 MG/ML
25 INJECTION INTRAMUSCULAR; INTRAVENOUS; SUBCUTANEOUS
Status: CANCELLED | OUTPATIENT
Start: 2021-11-23

## 2021-11-23 RX ADMIN — SODIUM CHLORIDE: 0.9 INJECTION, SOLUTION INTRAVENOUS at 09:11

## 2021-11-23 RX ADMIN — DIPHENHYDRAMINE HYDROCHLORIDE 25 MG: 25 CAPSULE ORAL at 09:11

## 2021-11-23 RX ADMIN — ACETAMINOPHEN 650 MG: 325 TABLET, FILM COATED ORAL at 09:11

## 2021-11-23 RX ADMIN — RITUXIMAB 1000 MG: 10 INJECTION, SOLUTION INTRAVENOUS at 09:11

## 2021-12-13 ENCOUNTER — PATIENT MESSAGE (OUTPATIENT)
Dept: RHEUMATOLOGY | Facility: CLINIC | Age: 41
End: 2021-12-13
Payer: COMMERCIAL

## 2021-12-22 ENCOUNTER — TELEPHONE (OUTPATIENT)
Dept: RHEUMATOLOGY | Facility: CLINIC | Age: 41
End: 2021-12-22
Payer: COMMERCIAL

## 2021-12-22 ENCOUNTER — PATIENT MESSAGE (OUTPATIENT)
Dept: RHEUMATOLOGY | Facility: CLINIC | Age: 41
End: 2021-12-22
Payer: COMMERCIAL

## 2021-12-30 ENCOUNTER — PATIENT MESSAGE (OUTPATIENT)
Dept: RHEUMATOLOGY | Facility: CLINIC | Age: 41
End: 2021-12-30
Payer: COMMERCIAL

## 2021-12-30 LAB
ALBUMIN SERPL-MCNC: 4 G/DL (ref 3.6–5.1)
ALBUMIN/GLOB SERPL: 1.3 (CALC) (ref 1–2.5)
ALP SERPL-CCNC: 46 U/L (ref 31–125)
ALT SERPL-CCNC: 13 U/L (ref 6–29)
AST SERPL-CCNC: 12 U/L (ref 10–30)
BASOPHILS # BLD AUTO: 32 CELLS/UL (ref 0–200)
BASOPHILS NFR BLD AUTO: 0.5 %
BILIRUB SERPL-MCNC: 0.4 MG/DL (ref 0.2–1.2)
BUN SERPL-MCNC: 12 MG/DL (ref 7–25)
BUN/CREAT SERPL: NORMAL (CALC) (ref 6–22)
CALCIUM SERPL-MCNC: 9.5 MG/DL (ref 8.6–10.2)
CHLORIDE SERPL-SCNC: 103 MMOL/L (ref 98–110)
CO2 SERPL-SCNC: 30 MMOL/L (ref 20–32)
CREAT SERPL-MCNC: 0.77 MG/DL (ref 0.5–1.1)
CRP SERPL-MCNC: 0.6 MG/L
EOSINOPHIL # BLD AUTO: 63 CELLS/UL (ref 15–500)
EOSINOPHIL NFR BLD AUTO: 1 %
ERYTHROCYTE [DISTWIDTH] IN BLOOD BY AUTOMATED COUNT: 12.8 % (ref 11–15)
ERYTHROCYTE [SEDIMENTATION RATE] IN BLOOD BY WESTERGREN METHOD: 9 MM/H
GLOBULIN SER CALC-MCNC: 3.1 G/DL (CALC) (ref 1.9–3.7)
GLUCOSE SERPL-MCNC: 73 MG/DL (ref 65–139)
HCT VFR BLD AUTO: 36.4 % (ref 35–45)
HGB BLD-MCNC: 12.3 G/DL (ref 11.7–15.5)
LYMPHOCYTES # BLD AUTO: 1310 CELLS/UL (ref 850–3900)
LYMPHOCYTES NFR BLD AUTO: 20.8 %
MCH RBC QN AUTO: 30.7 PG (ref 27–33)
MCHC RBC AUTO-ENTMCNC: 33.8 G/DL (ref 32–36)
MCV RBC AUTO: 90.8 FL (ref 80–100)
MONOCYTES # BLD AUTO: 737 CELLS/UL (ref 200–950)
MONOCYTES NFR BLD AUTO: 11.7 %
NEUTROPHILS # BLD AUTO: 4158 CELLS/UL (ref 1500–7800)
NEUTROPHILS NFR BLD AUTO: 66 %
PLATELET # BLD AUTO: 274 THOUSAND/UL (ref 140–400)
PMV BLD REES-ECKER: 11.5 FL (ref 7.5–12.5)
POTASSIUM SERPL-SCNC: 4 MMOL/L (ref 3.5–5.3)
PROT SERPL-MCNC: 7.1 G/DL (ref 6.1–8.1)
RBC # BLD AUTO: 4.01 MILLION/UL (ref 3.8–5.1)
SODIUM SERPL-SCNC: 136 MMOL/L (ref 135–146)
WBC # BLD AUTO: 6.3 THOUSAND/UL (ref 3.8–10.8)

## 2022-01-11 ENCOUNTER — TELEPHONE (OUTPATIENT)
Dept: RHEUMATOLOGY | Facility: CLINIC | Age: 42
End: 2022-01-11
Payer: COMMERCIAL

## 2022-01-11 DIAGNOSIS — D89.1 CRYOGLOBULINEMIC VASCULITIS: Primary | ICD-10-CM

## 2022-01-19 ENCOUNTER — PATIENT MESSAGE (OUTPATIENT)
Dept: RHEUMATOLOGY | Facility: CLINIC | Age: 42
End: 2022-01-19
Payer: COMMERCIAL

## 2022-02-15 ENCOUNTER — PATIENT MESSAGE (OUTPATIENT)
Dept: RHEUMATOLOGY | Facility: CLINIC | Age: 42
End: 2022-02-15
Payer: COMMERCIAL

## 2022-03-10 ENCOUNTER — PATIENT MESSAGE (OUTPATIENT)
Dept: RHEUMATOLOGY | Facility: CLINIC | Age: 42
End: 2022-03-10
Payer: COMMERCIAL

## 2022-03-10 ENCOUNTER — TELEPHONE (OUTPATIENT)
Dept: RHEUMATOLOGY | Facility: CLINIC | Age: 42
End: 2022-03-10

## 2022-03-10 NOTE — TELEPHONE ENCOUNTER
Do we have update on her Rituxan this matter would need to be directed to Infusion not us. We do not handle the authorizations and notifications for Infusions.   Dr. Buchanan

## 2022-03-11 NOTE — TELEPHONE ENCOUNTER
Staff on 1/19/22 patient was sent to have Cryoglobulins [facility] lab. These are only done at the big clinics by the way.     Patient seems to think it was still done but I never received a result.       1. I need someone to call the lab she went to ([facility] or [facility]) find out if it was done and get the result.       2. She apparently has new health insurance so please touch base with infusion to determine if Rituxan is authorized, needs authorization or do they need new orders with new insurance.     I need all of this ASAP she has a f/u 3/16/22 and I must have to see her.       Dr. Buchanan   
No

## 2022-03-16 ENCOUNTER — PATIENT MESSAGE (OUTPATIENT)
Dept: RHEUMATOLOGY | Facility: CLINIC | Age: 42
End: 2022-03-16
Payer: COMMERCIAL

## 2022-03-16 ENCOUNTER — OFFICE VISIT (OUTPATIENT)
Dept: RHEUMATOLOGY | Facility: CLINIC | Age: 42
End: 2022-03-16
Payer: COMMERCIAL

## 2022-03-16 VITALS — WEIGHT: 133.63 LBS | HEIGHT: 66 IN | BODY MASS INDEX: 21.47 KG/M2

## 2022-03-16 DIAGNOSIS — D89.1 CRYOGLOBULINEMIC VASCULITIS: Primary | ICD-10-CM

## 2022-03-16 PROCEDURE — 3008F PR BODY MASS INDEX (BMI) DOCUMENTED: ICD-10-PCS | Mod: CPTII,95,, | Performed by: INTERNAL MEDICINE

## 2022-03-16 PROCEDURE — 99214 PR OFFICE/OUTPT VISIT, EST, LEVL IV, 30-39 MIN: ICD-10-PCS | Mod: 95,,, | Performed by: INTERNAL MEDICINE

## 2022-03-16 PROCEDURE — 3008F BODY MASS INDEX DOCD: CPT | Mod: CPTII,95,, | Performed by: INTERNAL MEDICINE

## 2022-03-16 PROCEDURE — 99214 OFFICE O/P EST MOD 30 MIN: CPT | Mod: 95,,, | Performed by: INTERNAL MEDICINE

## 2022-03-16 PROCEDURE — 1159F MED LIST DOCD IN RCRD: CPT | Mod: CPTII,95,, | Performed by: INTERNAL MEDICINE

## 2022-03-16 PROCEDURE — 1160F RVW MEDS BY RX/DR IN RCRD: CPT | Mod: CPTII,95,, | Performed by: INTERNAL MEDICINE

## 2022-03-16 PROCEDURE — 1159F PR MEDICATION LIST DOCUMENTED IN MEDICAL RECORD: ICD-10-PCS | Mod: CPTII,95,, | Performed by: INTERNAL MEDICINE

## 2022-03-16 PROCEDURE — 1160F PR REVIEW ALL MEDS BY PRESCRIBER/CLIN PHARMACIST DOCUMENTED: ICD-10-PCS | Mod: CPTII,95,, | Performed by: INTERNAL MEDICINE

## 2022-03-16 NOTE — LETTER
March 16, 2022    Shamika Hannah  [address]  [place] [address]         [facility]  [address]  [facility] [address]  Phone: [phone]  Fax: [phone] To Whom It May Concern:    I am writing this and requests as an exemption regarding patient's new every 3 month a requirement for BLS skills assessment.  Patient is capable to complete the online course work informing of any changes to protocols.  What I am asking for is an exemption from the practical in-person skills assessment given this exacerbates her underlying autoimmune disease.  If we can hold patient to the [employer]'s every 2 years skills assessment. 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      Sincerely        Maryellen Buchanan, DO

## 2022-03-16 NOTE — LETTER
March 16, 2022    [place] [address]             [facility]  [address] [place] [address]  Phone: [phone]  Fax: [phone] To Whom It May Concern:      Discussion with patient regarding every 3 months CPR skills assessment.  I am asking to exempt patient from the skills portion of the exam being done every 3 months due to physical exertion that exacerbates her underlying autoimmune disorder.  She can continue to do the online portion reviewing any updated guidelines, but if we can follow the [employer] Association's every 2 years for skills requirement I would be greatly appreciative.  The bulk of patient's work is done via telemedicine, and I think this would be reasonable for the organization as her exposure to in-person patient care is quite limited.          Sincerely,            Maryellen Buchanan, DO  Senior Physician  R[facility]

## 2022-03-16 NOTE — PROGRESS NOTES
Subjective:          Chief Complaint: Shamika Young is a 41 y.o. female who had no chief complaint listed for this encounter.    HPI:  Interval events:  Primary Sjogren's cryoglobulinemic vasculitis.  Last Rituxan 11/09/2021 and 11/23/2021.  Patient was using [facility] unfortunately they will give a quantitative value but they were positive cryoglobulins as of 10/26/2021.    Most recent labs 12/29/2021 show no evidence of inflammation regarding her C reactive protein or her sedimentation rate.  Do note that her anemia has resolved since prior to Rituxan although it was a mild anemia.  Her CMP shows no evidence of renal or liver dysfunction.    T spot negative..     She denies fevers, chills. She notes some rashes with dusky changes at the knee exacerbates with stress. Mostly of her current rashes.   Worse when she goes to office for work- at [employer]. Which requires long drive and walking. Currently twice weekly is average.   No new livedo on hands.   Joint pains worse with all this movement. Fatigue as well.     Continues on    Rituxan periodically based on Cryos, symptoms and lab markers.   HCQ 200mg BID  Prednisone 5mg- only uses in pulse tapers (20-30mg few days)      Rheum Hx:   Patient is a 39-year-old female being referred by Dr. Chopra with hx of cryoglobulinemia vasculitis and Primary Sjogren's.    Patient started Adaptogenic supplement greatly helpful, no needing prednisone in weeks. Wants to see if FSA would cover this.     Patient seen at [facility]. Ultimately diagnosed [id number].  She states rash started during pregnancy of legs started with purpura of legs. She then developed more diffuse coalescing purpura but no open sores. No renal or respiratory disease to date.   Patient did have skin bx: LCV  Previously hypocomplementemic.   She did note fewer skin purpuric lesions after 2 cycles of RTX   She has noted more urticaria with wheal in the skin,  some reactions with food, wheat/rice seem to be  worst. She notes no new soaps or detergents. Present since 2017.   She has seen allergist with some nut and environmental allergies no changes present since childhood. - working with DR. Curits.   Dry eyes present but tolerates contacts.   Dry mouth more problematic.   Joints fluctuate between 6-9/10. Hand and wrist predominant.   No real Raynauds in triphasic pattern but cold hands and feet.   Patient c/o swelling of lips at times-no known food allergies.       She is noting more livedo and mottling in legs w/o activity, ++ fatigue increasing. She does note during flares some joint pains. 2-5 days every 2 weeks. Flares seem to be ocurring every 2 weeks.   She notes working from home has helped with overall fatigue/pain.   No numbness or tingling.     PFTs at [facility] were reportedly normal 7/2019 , no hemoptysis or SOB  Thinks she had CT chest.   No bone marrow bx.   No DVT, miscarriages, seizures or strokes.   No renal disease.        She is on hydroxychloroquine 200 mg twice daily.  Rituxan last dose 7/2019  cevimiline 30mg TID  Prednisone 10mg once daily PRN ( during flare daily rest is periodical.   Rituximab last dose 7/2019 (x 3 treatments 6 months apart. )     OTC:   Xylimelts.   Not using biotene.   sugarfree lemon  gylcerin lozenges.     Previous medications: Salagen with ASE with HA.   No hx of hepatitis, no malignancy in self to date.   Component      Latest Ref Rng & Units 3/3/2020   Anti-SSA Antibody      0.00 - 0.99 Ratio 4.67 (H)   Anti-SSA Interpretation      Negative Positive (A)   Anti-SSB Antibody      0.00 - 0.99 Ratio 2.40 (H)   Anti-SSB Interpretation      Negative Positive (A)   Anti Sm Antibody      0.00 - 0.99 Ratio 0.07   Anti-Sm Interpretation      Negative Negative   Anti Sm/RNP Antibody      0.00 - 0.99 Ratio 0.17   Anti-Sm/RNP Interpretation      Negative Negative   Rheumatoid Factor      0.0 - 15.0 IU/mL 484.0 (H)   CCP Antibodies      <5.0 U/mL <0.5   CHELSEA Screen      Negative <1:80  Positive (A)   ds DNA Ab      Negative 1:10 Negative 1:10   CHELSEA HEP-2 Titer       Positive >=1:2560 Speckled         REVIEW OF SYSTEMS:    Review of Systems   Constitutional: Positive for malaise/fatigue. Negative for fever and weight loss.   HENT: Negative for sore throat.    Eyes: Negative for double vision, photophobia and redness.   Respiratory: Negative for cough, shortness of breath and wheezing.    Cardiovascular: Negative for chest pain, palpitations and orthopnea.   Gastrointestinal: Negative for abdominal pain, constipation and diarrhea.   Genitourinary: Negative for dysuria, hematuria and urgency.   Musculoskeletal: Positive for joint pain and myalgias. Negative for back pain.   Skin: Positive for rash (livedo).   Neurological: Negative for dizziness, tingling, focal weakness and headaches.   Endo/Heme/Allergies: Does not bruise/bleed easily.   Psychiatric/Behavioral: Negative for depression, hallucinations and suicidal ideas. The patient is nervous/anxious.                Objective:            Past Medical History:   Diagnosis Date    Allergy     previously on immunotherapy    Anxiety     Depression     Lumbago with sciatica, right side     Lumbar disc disease     L4-L5    Other chronic pain     Panic disorder     Positive CHELSEA (antinuclear antibody)     Purpura     Sjogren's syndrome     Vasculitis      Family History   Problem Relation Age of Onset    Multiple sclerosis Mother     Heart disease Mother     Diabetes Mother     Hypertension Mother     Heart disease Father     Hyperlipidemia Father     Glaucoma Neg Hx     Retinal detachment Neg Hx     Macular degeneration Neg Hx      Social History     Tobacco Use    Smoking status: Former Smoker    Smokeless tobacco: Never Used   Substance Use Topics    Alcohol use: Yes     Comment: socially    Drug use: Never         Current Outpatient Medications on File Prior to Visit   Medication Sig Dispense Refill    ALPRAZolam (XANAX) 0.5 MG  tablet Take 0.5 mg by mouth 3 (three) times daily.      benzonatate (TESSALON) 100 MG capsule       buPROPion (WELLBUTRIN) 100 MG tablet       busPIRone (BUSPAR) 7.5 MG tablet       cevimeline (EVOXAC) 30 mg capsule Take 1 capsule (30 mg total) by mouth 3 (three) times daily. 270 capsule 3    dextroamphetamine-amphetamine (ADDERALL XR) 30 MG 24 hr capsule Take 30 mg by mouth every morning. 1/2 in AM 1/2 in PM      hydrOXYchloroQUINE (PLAQUENIL) 200 mg tablet Take 1 tablet (200 mg total) by mouth 2 (two) times daily. 180 tablet 3    levocetirizine (XYZAL) 5 MG tablet Take 5 mg by mouth once daily.      methylphenidate HCl (RITALIN) 10 MG tablet Take 10 mg by mouth once daily.      predniSONE (DELTASONE) 5 MG tablet Take 1 tablet (5 mg total) by mouth once daily. 30 tablet 11    traMADoL (ULTRAM) 50 mg tablet Take 1 tablet (50 mg total) by mouth every 8 (eight) hours as needed. 90 tablet 3    VYVANSE 30 mg capsule Take 30 mg by mouth nightly.       No current facility-administered medications on file prior to visit.       There were no vitals filed for this visit.    Physical Exam:    Physical Exam  Constitutional:       Appearance: She is well-developed.   Eyes:      General: Lids are normal.   Skin:     Comments: Legs with some mottling and livedo.    Neurological:      Mental Status: She is oriented to person, place, and time.   Psychiatric:         Behavior: Behavior normal.         Thought Content: Thought content normal.               Assessment:       No diagnosis found.       Plan:        There are no diagnoses linked to this encounter.     Patient with Cryoglobulinemic vasculitis treated last 7/2019 per RA protocol    cryos elevated at last check 10/2021      Do not sent to [facility] for labs anymore.    Tramadol PRN    Prednisone 5mg daily, may increase to 10 PRN flares.    Hep and T. Spot neg 2020  Holding on RTX.   Adaptogenic supplement seems to be allowing her to reduce prednisone, flares of  vasculitis and I    Primary Sjogrens:   HCQ 200mg BID   Evoxac 30mg TID  We will hold off on Rituxan for now        Likely FMS: robaxin        Immunosuppression status:     No follow-ups on file.      30min consultation with greater than 50% spent in counseling, chart review and coordination of care. All questions answered.  Thank you for allowing me to participate in the care of this very pleasant patient.        Received records-     PFT 7/2019:   CT chest w/o contras 8/2019: no fibrosis or changes to suggest ILD or lymphoid interstitial pneumonia. Calcified granuloma LLL. nonenlarged mediatstinal and hilar lymph nodes may be reactive.     UA 2019: no proteinuria/ no RBCs  Quant IgG: IgG slightly elevated, remaining ENL  SPEP 2019: polyclonal hypergammaglobulinemia, cannot r/o small monoclonal protein  Cryos 7/2019: 3  Cryofibrinogen 7/2019: negative  C3 normal  C4 3 and low.   High titer RF, SSA and SSB.   Normal ESR and CRP    Cryos: 3/2019 trace , repeat today still pending 10/2021.   Immunofixatio: 1/30/19: type II cryos monoclonal IgM kappa plus polyclonal IgG.     Baseline eye exam: dates 6/2018    Pulm Consult: 7/2019: Pt c/o SOB felt after w/up this was possible cord dysfunction vs rhinosinusitis.            22min telemedicine, 10min letter writing, 15 min chart and lab, care coordination for labs,  review prior to encounter in consultation with greater than 50% spent in counseling, chart review and coordination of care. All questions answered.  Total time 30 min     Patient has expressly consented to have letter and lab orders emailed from my work email to her email. She does not have faxing capability the labs are time sensitive. Patient is aware of the unencrypted nature of email and I have to the best of my ability attempted to utilize our network email for her privacy.     Note that an individual has the right under the Privacy Rule to request and have a covered health care provider communicate with  him or her by alternative means or at alternative locations, if reasonable. See 45 C.F.R. § 164.522(b). For example, a health care provider should accommodate an individuals request to receive appointment reminders via email rather than on a postcard, if email is a reasonable alternative means for that provider to communicate with the patient.

## 2022-03-18 NOTE — TELEPHONE ENCOUNTER
Contacted lab at [facility] and she stated they collect the blood there and then send it off for testing.    Taryn Villeda MA  3/18/2022

## 2022-03-18 NOTE — TELEPHONE ENCOUNTER
Please call [facility] and verify they can in fact do a cryoglobulin test. Not [facility] just [facility].   Thanks Dr. MOJICA

## 2022-03-22 ENCOUNTER — PATIENT MESSAGE (OUTPATIENT)
Dept: RHEUMATOLOGY | Facility: CLINIC | Age: 42
End: 2022-03-22
Payer: COMMERCIAL

## 2022-04-04 ENCOUNTER — PATIENT MESSAGE (OUTPATIENT)
Dept: OBSTETRICS AND GYNECOLOGY | Facility: CLINIC | Age: 42
End: 2022-04-04

## 2022-04-04 ENCOUNTER — OFFICE VISIT (OUTPATIENT)
Dept: OBSTETRICS AND GYNECOLOGY | Facility: CLINIC | Age: 42
End: 2022-04-04
Payer: COMMERCIAL

## 2022-04-04 VITALS
DIASTOLIC BLOOD PRESSURE: 84 MMHG | HEIGHT: 66 IN | BODY MASS INDEX: 21.29 KG/M2 | WEIGHT: 132.5 LBS | SYSTOLIC BLOOD PRESSURE: 124 MMHG

## 2022-04-04 DIAGNOSIS — R10.2 PELVIC PAIN: ICD-10-CM

## 2022-04-04 DIAGNOSIS — N94.3 PMS (PREMENSTRUAL SYNDROME): Primary | ICD-10-CM

## 2022-04-04 PROCEDURE — 3074F SYST BP LT 130 MM HG: CPT | Mod: CPTII,S$GLB,, | Performed by: OBSTETRICS & GYNECOLOGY

## 2022-04-04 PROCEDURE — 99213 PR OFFICE/OUTPT VISIT, EST, LEVL III, 20-29 MIN: ICD-10-PCS | Mod: S$GLB,,, | Performed by: OBSTETRICS & GYNECOLOGY

## 2022-04-04 PROCEDURE — 99999 PR PBB SHADOW E&M-EST. PATIENT-LVL IV: ICD-10-PCS | Mod: PBBFAC,,, | Performed by: OBSTETRICS & GYNECOLOGY

## 2022-04-04 PROCEDURE — 3008F BODY MASS INDEX DOCD: CPT | Mod: CPTII,S$GLB,, | Performed by: OBSTETRICS & GYNECOLOGY

## 2022-04-04 PROCEDURE — 3074F PR MOST RECENT SYSTOLIC BLOOD PRESSURE < 130 MM HG: ICD-10-PCS | Mod: CPTII,S$GLB,, | Performed by: OBSTETRICS & GYNECOLOGY

## 2022-04-04 PROCEDURE — 1159F MED LIST DOCD IN RCRD: CPT | Mod: CPTII,S$GLB,, | Performed by: OBSTETRICS & GYNECOLOGY

## 2022-04-04 PROCEDURE — 3079F DIAST BP 80-89 MM HG: CPT | Mod: CPTII,S$GLB,, | Performed by: OBSTETRICS & GYNECOLOGY

## 2022-04-04 PROCEDURE — 3008F PR BODY MASS INDEX (BMI) DOCUMENTED: ICD-10-PCS | Mod: CPTII,S$GLB,, | Performed by: OBSTETRICS & GYNECOLOGY

## 2022-04-04 PROCEDURE — 99213 OFFICE O/P EST LOW 20 MIN: CPT | Mod: S$GLB,,, | Performed by: OBSTETRICS & GYNECOLOGY

## 2022-04-04 PROCEDURE — 1159F PR MEDICATION LIST DOCUMENTED IN MEDICAL RECORD: ICD-10-PCS | Mod: CPTII,S$GLB,, | Performed by: OBSTETRICS & GYNECOLOGY

## 2022-04-04 PROCEDURE — 99999 PR PBB SHADOW E&M-EST. PATIENT-LVL IV: CPT | Mod: PBBFAC,,, | Performed by: OBSTETRICS & GYNECOLOGY

## 2022-04-04 PROCEDURE — 3079F PR MOST RECENT DIASTOLIC BLOOD PRESSURE 80-89 MM HG: ICD-10-PCS | Mod: CPTII,S$GLB,, | Performed by: OBSTETRICS & GYNECOLOGY

## 2022-04-04 RX ORDER — DROSPIRENONE AND ETHINYL ESTRADIOL 0.02-3(28)
KIT ORAL
Qty: 84 TABLET | Refills: 3 | Status: SHIPPED | OUTPATIENT
Start: 2022-04-04 | End: 2022-06-27 | Stop reason: ALTCHOICE

## 2022-04-04 NOTE — PROGRESS NOTES
"Chief Complaint   Patient presents with    [facility]    Dysmenorrhea    problems with IUD       History of Present Illness   41 y.o. F patient presents today for dysmenorrhea and PMS    H/o HSC IUD removal in the past  Had a ParaGard placed 2020  Thinks it has moved  It feels different  And  states she can feel something    Last 4-6 months, one week before cycle severe cramping  Wants to be check for fibroids    PMS symptoms are causing emotional issues, extremely irritable  Cannot tolerate SSRI  She is already on anxiety depression medication  COCP and POP caused her issues in the past    Past medical and surgical history reviewed.   I have reviewed the patient's medical history in detail and updated the computerized patient record.  Review of patient's allergies indicates:   Allergen Reactions    Tree nut Anaphylaxis    Tree nuts Anaphylaxis    Biaxin [clarithromycin] Other (See Comments)     GI symptoms     Azithromycin Rash    Ceclor [cefaclor] Rash    Ciprofloxacin Rash    Penicillins Rash    Tetracyclines Hives     Review of Systems  Constitutional: negative for chills and fatigue  Gastrointestinal: negative for abdominal pain, constipation, diarrhea, nausea and vomiting  Genitourinary:negative for abnormal menstrual periods, genital lesions and vaginal discharge, dysuria, frequency and hematuria    Physical Examination:  /84   Ht 5' 6" (1.676 m)   Wt 60.1 kg (132 lb 7.9 oz)   LMP 03/30/2022   BMI 21.39 kg/m²    Physical Exam:   Constitutional: She appears well-developed and well-nourished. No distress.               Genitourinary:    Genitourinary Comments: Deferred                         Assessment/Plan:  PMS (premenstrual syndrome)  -     drospirenone-ethinyl estradioL (JOSH) 3-0.02 mg per tablet; Take continuously, skip placebo pills.  Dispense: 84 tablet; Refill: 3    Pelvic pain  -     US Pelvis Comp with Transvag NON-OB (xpd; Future; Expected date: 04/04/2022      PMS: " discussed hormonal option and SSRI treatment. Will start continuous low dose COCP  US for IUD placement and pain. After US, she wants IUD removal.  Schedule FU for removal.

## 2022-04-11 ENCOUNTER — OFFICE VISIT (OUTPATIENT)
Dept: FAMILY MEDICINE | Facility: CLINIC | Age: 42
End: 2022-04-11
Payer: COMMERCIAL

## 2022-04-11 ENCOUNTER — PATIENT MESSAGE (OUTPATIENT)
Dept: OBSTETRICS AND GYNECOLOGY | Facility: CLINIC | Age: 42
End: 2022-04-11
Payer: COMMERCIAL

## 2022-04-11 DIAGNOSIS — R51.9 FREQUENT HEADACHES: Primary | ICD-10-CM

## 2022-04-11 PROCEDURE — 99213 PR OFFICE/OUTPT VISIT, EST, LEVL III, 20-29 MIN: ICD-10-PCS | Mod: 95,,, | Performed by: NURSE PRACTITIONER

## 2022-04-11 PROCEDURE — 1160F RVW MEDS BY RX/DR IN RCRD: CPT | Mod: CPTII,95,, | Performed by: NURSE PRACTITIONER

## 2022-04-11 PROCEDURE — 99213 OFFICE O/P EST LOW 20 MIN: CPT | Mod: 95,,, | Performed by: NURSE PRACTITIONER

## 2022-04-11 PROCEDURE — 1160F PR REVIEW ALL MEDS BY PRESCRIBER/CLIN PHARMACIST DOCUMENTED: ICD-10-PCS | Mod: CPTII,95,, | Performed by: NURSE PRACTITIONER

## 2022-04-11 PROCEDURE — 1159F MED LIST DOCD IN RCRD: CPT | Mod: CPTII,95,, | Performed by: NURSE PRACTITIONER

## 2022-04-11 PROCEDURE — 1159F PR MEDICATION LIST DOCUMENTED IN MEDICAL RECORD: ICD-10-PCS | Mod: CPTII,95,, | Performed by: NURSE PRACTITIONER

## 2022-04-11 RX ORDER — RIZATRIPTAN BENZOATE 10 MG/1
10 TABLET ORAL DAILY PRN
Qty: 10 TABLET | Refills: 3 | Status: SHIPPED | OUTPATIENT
Start: 2022-04-11 | End: 2022-06-27 | Stop reason: ALTCHOICE

## 2022-04-11 RX ORDER — RIZATRIPTAN BENZOATE 10 MG/1
10 TABLET ORAL DAILY PRN
COMMUNITY
End: 2022-04-11 | Stop reason: SDUPTHER

## 2022-04-11 NOTE — PROGRESS NOTES
Subjective:       Patient ID: Shamika Young is a 41 y.o. female.    Chief Complaint: No chief complaint on file.    The patient location is: [place], [place]  The chief complaint leading to consultation is: headaches    Visit type: audiovisual    Face to Face time with patient: 12  15 minutes of total time spent on the encounter, which includes face to face time and non-face to face time preparing to see the patient (eg, review of tests), Obtaining and/or reviewing separately obtained history, Documenting clinical information in the electronic or other health record, Independently interpreting results (not separately reported) and communicating results to the patient/family/caregiver, or Care coordination (not separately reported).         Each patient to whom he or she provides medical services by telemedicine is:  (1) informed of the relationship between the physician and patient and the respective role of any other health care provider with respect to management of the patient; and (2) notified that he or she may decline to receive medical services by telemedicine and may withdraw from such care at any time.    Notes:   Patient says she has had headaches since childhood, at least once monthly, tend to be associated with weather changes. She was prescribed Maxalt by a telemedicine provider in last several months and has founf it does help. Had a severe migraine type headache yesterday, resolved about 2 hours after taking Maxalt, requesting refill of this prescription. Has not had neurology evaluation.    Past Medical History:  No date: Allergy      Comment:  previously on immunotherapy  No date: Anxiety  No date: Depression  No date: Lumbago with sciatica, right side  No date: Lumbar disc disease      Comment:  L4-L5  No date: Other chronic pain  No date: Panic disorder  No date: Positive CHELSEA (antinuclear antibody)  No date: Purpura  No date: Sjogren's syndrome  No date: Vasculitis    Past Surgical History:  2012:  CYST REMOVAL      Comment:  mandibular  7/17/2020: HYSTEROSCOPY; N/A      Comment:  Procedure: HYSTEROSCOPY/ with IUD removal;  Surgeon:                Cherry Christine MD;  Location: [facility];  Service:                OB/GYN;  Laterality: N/A;  7/17/2020: INTRAUTERINE DEVICE INSERTION      Comment:  Procedure: INSERTION, INTRAUTERINE DEVICE;  Surgeon:                Cherry Christine MD;  Location: [facility];  Service:                OB/GYN;;  No date: lumbar spine procedure      Comment:  2004/2014 7/17/2020: REMOVAL OF INTRAUTERINE DEVICE (IUD)      Comment:  Procedure: REMOVAL, INTRAUTERINE DEVICE;  Surgeon: Cherry Christine MD;  Location: [facility];  Service: OB/GYN;;  No date: TONSILLECTOMY AND ADENOIDECTOMY      Comment:  7th grade    Review of patient's family history indicates:  Problem: Multiple sclerosis      Relation: Mother          Age of Onset: (Not Specified)  Problem: Heart disease      Relation: Mother          Age of Onset: (Not Specified)  Problem: Diabetes      Relation: Mother          Age of Onset: (Not Specified)  Problem: Hypertension      Relation: Mother          Age of Onset: (Not Specified)  Problem: Heart disease      Relation: Father          Age of Onset: (Not Specified)  Problem: Hyperlipidemia      Relation: Father          Age of Onset: (Not Specified)  Problem: Glaucoma      Relation: Neg Hx          Age of Onset: (Not Specified)  Problem: Retinal detachment      Relation: Neg Hx          Age of Onset: (Not Specified)  Problem: Macular degeneration      Relation: Neg Hx          Age of Onset: (Not Specified)      Social History    Socioeconomic History      Marital status:     Tobacco Use      Smoking status: Former Smoker      Smokeless tobacco: Never Used    Substance and Sexual Activity      Alcohol use: Yes        Comment: socially      Drug use: Never      Current Outpatient Medications:  ALPRAZolam (XANAX) 0.5 MG tablet, Take 0.5 mg by mouth 3 (three)  times daily., Disp: , Rfl:   buPROPion (WELLBUTRIN) 100 MG tablet, , Disp: , Rfl:   cevimeline (EVOXAC) 30 mg capsule, Take 1 capsule (30 mg total) by mouth 3 (three) times daily., Disp: 270 capsule, Rfl: 3  drospirenone-ethinyl estradioL (JOSH) 3-0.02 mg per tablet, Take continuously, skip placebo pills., Disp: 84 tablet, Rfl: 3  hydrOXYchloroQUINE (PLAQUENIL) 200 mg tablet, Take 1 tablet (200 mg total) by mouth 2 (two) times daily., Disp: 180 tablet, Rfl: 3  methylphenidate HCl (RITALIN) 10 MG tablet, Take 10 mg by mouth once daily., Disp: , Rfl:   predniSONE (DELTASONE) 5 MG tablet, Take 1 tablet (5 mg total) by mouth once daily., Disp: 30 tablet, Rfl: 11  rizatriptan (MAXALT) 10 MG tablet, Take 1 tablet (10 mg total) by mouth daily as needed for Migraine. Take at migraine onset, Disp: 10 tablet, Rfl: 3  traMADoL (ULTRAM) 50 mg tablet, Take 1 tablet (50 mg total) by mouth every 8 (eight) hours as needed., Disp: 90 tablet, Rfl: 3  VYVANSE 30 mg capsule, Take 30 mg by mouth nightly., Disp: , Rfl:     No current facility-administered medications for this visit.      Review of patient's allergies indicates:   -- Tree nut -- Anaphylaxis   -- Tree nuts -- Anaphylaxis   -- Biaxin (clarithromycin) -- Other (See Comments)    --  GI symptoms   -- Azithromycin -- Rash   -- Ceclor (cefaclor) -- Rash   -- Ciprofloxacin -- Rash   -- Penicillins -- Rash   -- Tetracyclines -- Hives    Review of Systems   Constitutional: Negative for activity change and unexpected weight change.   HENT: Negative for hearing loss, rhinorrhea and trouble swallowing.    Eyes: Negative for discharge and visual disturbance.   Respiratory: Negative for chest tightness and wheezing.    Cardiovascular: Negative for chest pain and palpitations.   Gastrointestinal: Negative for blood in stool, constipation, diarrhea and vomiting.   Endocrine: Negative for polydipsia and polyuria.   Genitourinary: Negative for difficulty urinating, dysuria, hematuria and  menstrual problem.   Musculoskeletal: Positive for arthralgias. Negative for joint swelling and neck pain.   Neurological: Positive for weakness and headaches.   Psychiatric/Behavioral: Negative for confusion and dysphoric mood.         Objective:      Physical Exam  Constitutional:       General: She is not in acute distress.     Appearance: Normal appearance. She is not ill-appearing or toxic-appearing.   HENT:      Head: Normocephalic and atraumatic.   Pulmonary:      Effort: Pulmonary effort is normal. No respiratory distress.   Neurological:      General: No focal deficit present.      Mental Status: She is alert and oriented to person, place, and time.      Cranial Nerves: No cranial nerve deficit.   Psychiatric:         Mood and Affect: Mood normal.         Behavior: Behavior normal.         Assessment:       Problem List Items Addressed This Visit    None     Visit Diagnoses     Frequent headaches    -  Primary    Relevant Medications    rizatriptan (MAXALT) 10 MG tablet    Other Relevant Orders    Ambulatory referral/consult to Neurology          Plan:       1. Frequent headaches    - rizatriptan (MAXALT) 10 MG tablet; Take 1 tablet (10 mg total) by mouth daily as needed for Migraine. Take at migraine onset  Dispense: 10 tablet; Refill: 3  - Ambulatory referral/consult to Neurology; Future

## 2022-04-11 NOTE — PROGRESS NOTES
Answers for HPI/ROS submitted by the patient on 4/10/2022  activity change: No  unexpected weight change: No  neck pain: No  hearing loss: No  rhinorrhea: No  trouble swallowing: No  eye discharge: No  visual disturbance: No  chest tightness: No  wheezing: No  chest pain: No  palpitations: No  blood in stool: No  constipation: No  vomiting: No  diarrhea: No  polydipsia: No  polyuria: No  difficulty urinating: No  hematuria: No  menstrual problem: No  dysuria: No  joint swelling: No  arthralgias: Yes  headaches: Yes  weakness: Yes  confusion: No  dysphoric mood: No

## 2022-04-12 ENCOUNTER — PATIENT MESSAGE (OUTPATIENT)
Dept: RHEUMATOLOGY | Facility: CLINIC | Age: 42
End: 2022-04-12
Payer: COMMERCIAL

## 2022-04-18 ENCOUNTER — PATIENT MESSAGE (OUTPATIENT)
Dept: RHEUMATOLOGY | Facility: CLINIC | Age: 42
End: 2022-04-18
Payer: COMMERCIAL

## 2022-04-22 ENCOUNTER — TELEPHONE (OUTPATIENT)
Dept: NEUROLOGY | Facility: CLINIC | Age: 42
End: 2022-04-22
Payer: COMMERCIAL

## 2022-04-25 ENCOUNTER — OFFICE VISIT (OUTPATIENT)
Dept: NEUROLOGY | Facility: CLINIC | Age: 42
End: 2022-04-25
Payer: COMMERCIAL

## 2022-04-25 ENCOUNTER — LAB VISIT (OUTPATIENT)
Dept: LAB | Facility: HOSPITAL | Age: 42
End: 2022-04-25
Attending: INTERNAL MEDICINE
Payer: COMMERCIAL

## 2022-04-25 VITALS
HEIGHT: 66 IN | HEART RATE: 97 BPM | BODY MASS INDEX: 21.33 KG/M2 | WEIGHT: 132.69 LBS | SYSTOLIC BLOOD PRESSURE: 121 MMHG | TEMPERATURE: 98 F | DIASTOLIC BLOOD PRESSURE: 81 MMHG | RESPIRATION RATE: 17 BRPM

## 2022-04-25 DIAGNOSIS — D89.1 CRYOGLOBULINEMIC VASCULITIS: ICD-10-CM

## 2022-04-25 DIAGNOSIS — G43.709 CHRONIC MIGRAINE WITHOUT AURA WITHOUT STATUS MIGRAINOSUS, NOT INTRACTABLE: Primary | ICD-10-CM

## 2022-04-25 DIAGNOSIS — R51.9 FREQUENT HEADACHES: ICD-10-CM

## 2022-04-25 PROCEDURE — 1160F PR REVIEW ALL MEDS BY PRESCRIBER/CLIN PHARMACIST DOCUMENTED: ICD-10-PCS | Mod: CPTII,S$GLB,, | Performed by: PHYSICIAN ASSISTANT

## 2022-04-25 PROCEDURE — 82595 ASSAY OF CRYOGLOBULIN: CPT | Performed by: INTERNAL MEDICINE

## 2022-04-25 PROCEDURE — 36415 COLL VENOUS BLD VENIPUNCTURE: CPT | Mod: PO | Performed by: INTERNAL MEDICINE

## 2022-04-25 PROCEDURE — 1159F MED LIST DOCD IN RCRD: CPT | Mod: CPTII,S$GLB,, | Performed by: PHYSICIAN ASSISTANT

## 2022-04-25 PROCEDURE — 3079F PR MOST RECENT DIASTOLIC BLOOD PRESSURE 80-89 MM HG: ICD-10-PCS | Mod: CPTII,S$GLB,, | Performed by: PHYSICIAN ASSISTANT

## 2022-04-25 PROCEDURE — 3074F SYST BP LT 130 MM HG: CPT | Mod: CPTII,S$GLB,, | Performed by: PHYSICIAN ASSISTANT

## 2022-04-25 PROCEDURE — 1159F PR MEDICATION LIST DOCUMENTED IN MEDICAL RECORD: ICD-10-PCS | Mod: CPTII,S$GLB,, | Performed by: PHYSICIAN ASSISTANT

## 2022-04-25 PROCEDURE — 99205 PR OFFICE/OUTPT VISIT, NEW, LEVL V, 60-74 MIN: ICD-10-PCS | Mod: S$GLB,,, | Performed by: PHYSICIAN ASSISTANT

## 2022-04-25 PROCEDURE — 1160F RVW MEDS BY RX/DR IN RCRD: CPT | Mod: CPTII,S$GLB,, | Performed by: PHYSICIAN ASSISTANT

## 2022-04-25 PROCEDURE — 3008F BODY MASS INDEX DOCD: CPT | Mod: CPTII,S$GLB,, | Performed by: PHYSICIAN ASSISTANT

## 2022-04-25 PROCEDURE — 3008F PR BODY MASS INDEX (BMI) DOCUMENTED: ICD-10-PCS | Mod: CPTII,S$GLB,, | Performed by: PHYSICIAN ASSISTANT

## 2022-04-25 PROCEDURE — 99999 PR PBB SHADOW E&M-EST. PATIENT-LVL V: ICD-10-PCS | Mod: PBBFAC,,, | Performed by: PHYSICIAN ASSISTANT

## 2022-04-25 PROCEDURE — 3074F PR MOST RECENT SYSTOLIC BLOOD PRESSURE < 130 MM HG: ICD-10-PCS | Mod: CPTII,S$GLB,, | Performed by: PHYSICIAN ASSISTANT

## 2022-04-25 PROCEDURE — 99999 PR PBB SHADOW E&M-EST. PATIENT-LVL V: CPT | Mod: PBBFAC,,, | Performed by: PHYSICIAN ASSISTANT

## 2022-04-25 PROCEDURE — 3079F DIAST BP 80-89 MM HG: CPT | Mod: CPTII,S$GLB,, | Performed by: PHYSICIAN ASSISTANT

## 2022-04-25 PROCEDURE — 99205 OFFICE O/P NEW HI 60 MIN: CPT | Mod: S$GLB,,, | Performed by: PHYSICIAN ASSISTANT

## 2022-04-25 RX ORDER — GALCANEZUMAB 120 MG/ML
INJECTION, SOLUTION SUBCUTANEOUS
Qty: 2 ML | Refills: 0 | Status: SHIPPED | OUTPATIENT
Start: 2022-04-25 | End: 2022-06-27 | Stop reason: ALTCHOICE

## 2022-04-25 RX ORDER — RIMEGEPANT SULFATE 75 MG/75MG
75 TABLET, ORALLY DISINTEGRATING ORAL ONCE AS NEEDED
Qty: 8 TABLET | Refills: 2 | Status: SHIPPED | OUTPATIENT
Start: 2022-04-25 | End: 2022-08-24 | Stop reason: SDUPTHER

## 2022-04-25 RX ORDER — GALCANEZUMAB 120 MG/ML
120 INJECTION, SOLUTION SUBCUTANEOUS
Qty: 1 ML | Refills: 11 | Status: SHIPPED | OUTPATIENT
Start: 2022-04-25 | End: 2023-03-20 | Stop reason: ALTCHOICE

## 2022-04-25 RX ORDER — BUSPIRONE HYDROCHLORIDE 10 MG/1
10 TABLET ORAL 3 TIMES DAILY
COMMUNITY
Start: 2022-03-25

## 2022-04-25 NOTE — PROGRESS NOTES
[facility]  [address] [facility] [address]  [place] [address]  Phone:[phone]  Fax: [phone]   New Patient Consultation    Patient Name: Shamika Young  : 1980  MRN:  [id number]  Today: 2022   chief complaint: Headache    PCP: Nathan Núñez MD.    Assessment:   Shamika Young is a 41 y.o. 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 HA appear chronic migrainous.       Review:    ICD-10-CM ICD-9-CM   1. Chronic migraine without aura without status migrainosus, not intractable  G43.709 346.70   2. Frequent headaches  R51.9 784.0   3. Cryoglobulinemic vasculitis  D89.1 273.2         Plan:   Discussed realistic goals of care with patient at length. Discussed medication options, need for lifestyle adjustment. Discussed treatment will take time. Goal will be to reduce frequency/intensity/quantity of HA, not to be completely HA free. Gave copy of [facility] triggers for migraine informational sheet, and discussed clinic's non narcotic policy re: HA. Patient voiced understanding and agreement.            -continue to track HA           -will have patient work on lifestyle           -if despite intervention HA worsen, will get updated imaging study           -discussed potential for teratogenicity with treatment, if her family planning status should change, discussed I'd like her to let office know immediately. She voice agreement    For HA Prevention:  1 offered topamax vs emgality, chose emgality, discussed adv effects/dosing, had neuro nurse do a teaching, all questions answered  2 mood medications per other providers      For HA :  Stop maxalt (hx of vasculitis)  Try nurtec, discussed adv effects/dosing she agreed    To break up Headaches:  Can consider nerve blocks (not discussed)     Other:  N/A           All test  results as well as any necessary instructions were reviewed and discussed with patient.    Review:  Orders Placed This Encounter    rimegepant (NURTEC) 75 mg odt    galcanezumab-gnlm (EMGALITY PEN) 120 mg/mL PnIj    galcanezumab-gnlm (EMGALITY PEN) 120 mg/mL PnIj         Patient to return to PCP/other specialists for all other problems  Patient to continue on all medications as Rx'd   A detailed AVS was provided to the patient with patient readback   RTO-2-3 months   The patient indicates understanding of these issues and agrees to the plan.    HPI:   Shamika Young is a 41 y.o. 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     HA HPI:  Start:Since HS, took triptans, and seemed to aide, and in 2021 returned  (frequency), started new BC about a month and noticed frequency intensified   History of trauma (no), History of CNS infection (no), History of Stroke (no)  Location:frontalis and retroorbital and more to the top of the head, occasionally down the cheek (right)  Severity: range: 3-6/10  Duration:8-12 hours in a day   Frequency:12-15 HD a month   Associated factors (bolded positive) WITH HA  (or migraine): Nausea, vomiting, photophobia, phonophobia, tinnitus, scalp pain, vision loss, diplopia, scintillations, eye pain, jaw pain, weakness?    Tried:maxalt, no preventative   Triggers (in bold): stress, lack of sleep, too much caffeine(soda and coffee), too little caffeine, weather change, seasonal change, strong odours, bright lights, sunlight, food  Last HA: yesterday  Positives in bold: Hx of Kidney Stones, asthma (as a child), GI bleed, osteoporosis, CAD/MI, CVA/TIA, DM    Imaging on file: none   Therapies tried in past: (failures to be marked, if known---why did it fail?)  vyvanse  wellbutrin  maxalt  Prednisone   buspar  zofran  imitrex  depakote    Medication  Reconciliation:   Current Outpatient Medications   Medication Sig Dispense Refill    CHOLINE ORAL Take by mouth.      ergocalciferol, vitamin D2, (VITAMIN D ORAL) Take by mouth.      Lactobac no.41/Bifidobact no.7 (PROBIOTIC-10 ORAL) Take by mouth. bromoline      thymol/chlorophyllin (CHLOROPHYLL ORAL) Take by mouth.      TYROSINE ORAL Take by mouth.      ALPRAZolam (XANAX) 0.5 MG tablet Take 0.5 mg by mouth 3 (three) times daily.      buPROPion (WELLBUTRIN) 100 MG tablet       busPIRone (BUSPAR) 10 MG tablet Take 10 mg by mouth 3 (three) times daily.      cevimeline (EVOXAC) 30 mg capsule Take 1 capsule (30 mg total) by mouth 3 (three) times daily. 270 capsule 3    drospirenone-ethinyl estradioL (JOSH) 3-0.02 mg per tablet Take continuously, skip placebo pills. 84 tablet 3    galcanezumab-gnlm (EMGALITY PEN) 120 mg/mL PnIj Starter dose, inject as shown by neuro nurse 2 mL 0    galcanezumab-gnlm (EMGALITY PEN) 120 mg/mL PnIj Inject 120 mg into the skin every 28 days. maintenance 1 mL 11    hydrOXYchloroQUINE (PLAQUENIL) 200 mg tablet Take 1 tablet (200 mg total) by mouth 2 (two) times daily. 180 tablet 3    methylphenidate HCl (RITALIN) 10 MG tablet Take 10 mg by mouth once daily.      predniSONE (DELTASONE) 5 MG tablet Take 1 tablet (5 mg total) by mouth once daily. 30 tablet 11    rimegepant (NURTEC) 75 mg odt Take 1 tablet (75 mg total) by mouth once as needed for Migraine. Place ODT tablet on the tongue; alternatively the ODT tablet may be placed under the tongue 8 tablet 2    rizatriptan (MAXALT) 10 MG tablet Take 1 tablet (10 mg total) by mouth daily as needed for Migraine. Take at migraine onset 10 tablet 3    traMADoL (ULTRAM) 50 mg tablet Take 1 tablet (50 mg total) by mouth every 8 (eight) hours as needed. 90 tablet 3    VYVANSE 30 mg capsule Take 30 mg by mouth nightly.       No current facility-administered medications for this visit.     Review of patient's allergies indicates:    Allergen Reactions    Tree nut Anaphylaxis    Tree nuts Anaphylaxis    Biaxin [clarithromycin] Other (See Comments)     GI symptoms     Azithromycin Rash    Ceclor [cefaclor] Rash    Ciprofloxacin Rash    Penicillins Rash    Tetracyclines Hives       PMHx:  Past Medical History:   Diagnosis Date    Allergy     previously on immunotherapy    Anxiety     Depression     Lumbago with sciatica, right side     Lumbar disc disease     L4-L5    Other chronic pain     Panic disorder     Positive CHELSEA (antinuclear antibody)     Purpura     Sjogren's syndrome     Vasculitis      Past Surgical History:   Procedure Laterality Date    CYST REMOVAL  2012    mandibular    HYSTEROSCOPY N/A 7/17/2020    Procedure: HYSTEROSCOPY/ with IUD removal;  Surgeon: Cherry Christine MD;  Location: [facility];  Service: OB/GYN;  Laterality: N/A;    INTRAUTERINE DEVICE INSERTION  7/17/2020    Procedure: INSERTION, INTRAUTERINE DEVICE;  Surgeon: Cherry Christine MD;  Location: [facility];  Service: OB/GYN;;    lumbar spine procedure      2004/2014    REMOVAL OF INTRAUTERINE DEVICE (IUD)  7/17/2020    Procedure: REMOVAL, INTRAUTERINE DEVICE;  Surgeon: Cherry Christine MD;  Location: [facility];  Service: OB/GYN;;    TONSILLECTOMY AND ADENOIDECTOMY      7th grade       Fhx:  Family History   Problem Relation Age of Onset    Multiple sclerosis Mother     Heart disease Mother     Diabetes Mother     Hypertension Mother     Heart disease Father     Hyperlipidemia Father     Glaucoma Neg Hx     Retinal detachment Neg Hx     Macular degeneration Neg Hx        Shx:   Social History     Socioeconomic History    Marital status:    Tobacco Use    Smoking status: Former Smoker    Smokeless tobacco: Never Used   Substance and Sexual Activity    Alcohol use: Yes     Comment: socially    Drug use: Never           Labs:   Reviewed in chart     Imaging:   Reviewed in chart       Other testing:  Reviewed in chart     Note: I  "have independently reviewed any/all imaging/labs/tests and agree with the report (s) as documented.  Any discrepancies will be as noted/demarcated by free text.  AUGIE MG 4/25/2022                     ROS:   Review Of Systems (questions asked, positive or additions in BOLD)  Gen: Weight change, fatigue/malaise, pyrexia   HEENT: Tinnitus, headache,  blurred vision, eye pain, diplopia, photophobia,  nose bleeds, congestion, sore throat, jaw pain, scalp pain, neck stiffness   Card: Palpitations, CP   Pulm: SOB, cough   Vas: Easy bruising, easy bleeding   GI: N/V/D/C, incontinence, hematemesis, hematochezia    : incontinence, hematuria   Endocrine: Temp intolerance, polyuria, polydipsia   M/S: Neck pain, myalgia, back pain, joint pain, falls    Neuro: PER HPI   PSY: Memory loss, confusion, depression, anxiety, trouble in sleep          EXAM:   /81 (BP Location: Right arm, Patient Position: Sitting, BP Method: Medium (Automatic))   Pulse 97   Temp 98.2 °F (36.8 °C) (Temporal)   Resp 17   Ht 5' 6" (1.676 m)   Wt 60.2 kg (132 lb 11.5 oz)   LMP 03/30/2022   BMI 21.42 kg/m²    GEN:  NAD  HEENT: NC/AT, Frontalis was NTTP, temporalis was NTTP,  nares patent, dentition appropriate neck supple, trachea midline      EXTREM:    no edema present.    NEURO:  Mental Status:  Awake, alert and appropriately oriented to time, place, and person.  Normal attention and concentration.  Speech is fluent and appropriate language function (I.e., comprehension, repetition, etc).     Cranial Nerves:    Extraocular movements are intact and without nystagmus.  Visual fields are full to confrontation testing.   Facial movement is symmetric.  Facial sensation is intact.  Hearing is normal. Uvula in midline. FROM of neck in all (6) directions, shoulder shrug symmetrical. Tongue in midline without fasiculation.     Motor:  RUE:appropriate against gravity and medium force as tested 5/5              LUE: appropriate against gravity and " medium force as tested 5/5              RLE:appropriate against gravity and medium force as tested 5/5              LLE: appropriate against gravity and medium force as tested 5/5  Tremor/pronator drift not apparent.     finger extension strength was strong bilat     Sensory:  RUE  intact light touch, proprioception and temperature  LUE intact light touch, proprioception and temperature    RLE intact light touch  LLE intact light touch      DTR's:                                            R              L  biceps 2+ 2+         brachioradialis 2+ 2+   Knee jerk 2+ 2+      Coordination:  FTN-WNL.      Gait and Stance: Normal manner of stance and gait function testing. Romberg was negative         This document has been electronically signed by Mr. Eduar Serra MPA, PA-C on 4/25/2022, I have personally typed this message using the EMR.       Dr Daniel MD was available during today's visit.     Personal Protective Equipment:    Personal Protective Equipment was used during this encounter including:KN95 and non latex gloves.          CC: Nathan Núñez MD/MATT Eugene

## 2022-04-25 NOTE — PATIENT INSTRUCTIONS
To reduce headaches:  Try the emgality      To abort headaches:  Try the nurtec  Put the maxalt

## 2022-04-26 ENCOUNTER — PATIENT MESSAGE (OUTPATIENT)
Dept: PHARMACY | Facility: CLINIC | Age: 42
End: 2022-04-26
Payer: COMMERCIAL

## 2022-04-26 ENCOUNTER — TELEPHONE (OUTPATIENT)
Dept: PHARMACY | Facility: CLINIC | Age: 42
End: 2022-04-26
Payer: COMMERCIAL

## 2022-04-26 NOTE — TELEPHONE ENCOUNTER
Emgality prescription has been APPROVED FROM 4/25/2022 TO 10/25/2022 with copayment of $50. Nurtec prescription has been APPROVED FROM 4/25/2022 TO 4/25/2023 with copayment of $0.       [facility] [place] @[phone] will reach out to patient for further correspondence.

## 2022-05-02 ENCOUNTER — PATIENT MESSAGE (OUTPATIENT)
Dept: RHEUMATOLOGY | Facility: CLINIC | Age: 42
End: 2022-05-02
Payer: COMMERCIAL

## 2022-05-03 ENCOUNTER — OFFICE VISIT (OUTPATIENT)
Dept: FAMILY MEDICINE | Facility: CLINIC | Age: 42
End: 2022-05-03
Payer: COMMERCIAL

## 2022-05-03 ENCOUNTER — TELEPHONE (OUTPATIENT)
Dept: INFUSION THERAPY | Facility: HOSPITAL | Age: 42
End: 2022-05-03
Payer: COMMERCIAL

## 2022-05-03 VITALS — WEIGHT: 132 LBS | BODY MASS INDEX: 21.21 KG/M2 | HEIGHT: 66 IN

## 2022-05-03 DIAGNOSIS — B34.9 VIRAL SYNDROME: Primary | ICD-10-CM

## 2022-05-03 DIAGNOSIS — R05.8 DRY COUGH: ICD-10-CM

## 2022-05-03 PROCEDURE — 1160F PR REVIEW ALL MEDS BY PRESCRIBER/CLIN PHARMACIST DOCUMENTED: ICD-10-PCS | Mod: CPTII,95,, | Performed by: INTERNAL MEDICINE

## 2022-05-03 PROCEDURE — 1159F PR MEDICATION LIST DOCUMENTED IN MEDICAL RECORD: ICD-10-PCS | Mod: CPTII,95,, | Performed by: INTERNAL MEDICINE

## 2022-05-03 PROCEDURE — 99213 OFFICE O/P EST LOW 20 MIN: CPT | Mod: 95,,, | Performed by: INTERNAL MEDICINE

## 2022-05-03 PROCEDURE — 3008F PR BODY MASS INDEX (BMI) DOCUMENTED: ICD-10-PCS | Mod: CPTII,95,, | Performed by: INTERNAL MEDICINE

## 2022-05-03 PROCEDURE — 3008F BODY MASS INDEX DOCD: CPT | Mod: CPTII,95,, | Performed by: INTERNAL MEDICINE

## 2022-05-03 PROCEDURE — 99213 PR OFFICE/OUTPT VISIT, EST, LEVL III, 20-29 MIN: ICD-10-PCS | Mod: 95,,, | Performed by: INTERNAL MEDICINE

## 2022-05-03 PROCEDURE — 1159F MED LIST DOCD IN RCRD: CPT | Mod: CPTII,95,, | Performed by: INTERNAL MEDICINE

## 2022-05-03 PROCEDURE — 1160F RVW MEDS BY RX/DR IN RCRD: CPT | Mod: CPTII,95,, | Performed by: INTERNAL MEDICINE

## 2022-05-03 RX ORDER — CODEINE PHOSPHATE AND GUAIFENESIN 10; 100 MG/5ML; MG/5ML
5 SOLUTION ORAL 3 TIMES DAILY PRN
Qty: 118 ML | Refills: 0 | Status: SHIPPED | OUTPATIENT
Start: 2022-05-03 | End: 2022-05-13

## 2022-05-03 RX ORDER — PREDNISONE 10 MG/1
40 TABLET ORAL DAILY
Qty: 20 TABLET | Refills: 0 | Status: SHIPPED | OUTPATIENT
Start: 2022-05-03 | End: 2022-05-08

## 2022-05-03 NOTE — PROGRESS NOTES
Assessment and Plan:    1. Viral syndrome  Presumed flu A. Treating symptomatically. Discussed testing so that tamiflu or xofluza could be prescribed if needed, but she is not interested.  - predniSONE (DELTASONE) 10 MG tablet; Take 4 tablets (40 mg total) by mouth once daily. for 5 days  Dispense: 20 tablet; Refill: 0  - guaiFENesin-codeine 100-10 mg/5 ml (TUSSI-ORGANIDIN NR)  mg/5 mL syrup; Take 5 mLs by mouth 3 (three) times daily as needed for Cough.  Dispense: 118 mL; Refill: 0    2. Dry cough  - predniSONE (DELTASONE) 10 MG tablet; Take 4 tablets (40 mg total) by mouth once daily. for 5 days  Dispense: 20 tablet; Refill: 0  - guaiFENesin-codeine 100-10 mg/5 ml (TUSSI-ORGANIDIN NR)  mg/5 mL syrup; Take 5 mLs by mouth 3 (three) times daily as needed for Cough.  Dispense: 118 mL; Refill: 0    ______________________________________________________________________  Subjective:    Chief Complaint:  Virus symptoms    HPI:  Shamika is a 41 y.o. year old female patient with telemedicine visit today as consultation for viral symptoms    The patient location is: home in [place]  The chief complaint leading to consultation is: as above    Visit type: audiovisual    Face to Face time with patient: 15 minutes  20 minutes of total time spent on the encounter, which includes face to face time and non-face to face time preparing to see the patient (eg, review of tests), Obtaining and/or reviewing separately obtained history, Documenting clinical information in the electronic or other health record, Independently interpreting results (not separately reported) and communicating results to the patient/family/caregiver, or Care coordination (not separately reported).     Each patient to whom he or she provides medical services by telemedicine is:  (1) informed of the relationship between the physician and patient and the respective role of any other health care provider with respect to management of the patient; and (2)  notified that he or she may decline to receive medical services by telemedicine and may withdraw from such care at any time.    Notes:     She reports that her 5 year old son has been sick (fever, vomiting, muscle aches) in the last week. He had not been tested specifically, but flu A has been going around at his school, so she presumed this is what he had. She started to feel sick last night. She has been having dry cough, muscle aches, trouble sleeping.     She typically takes prednisone 40 mg daily for a few days when she gets sick like this and finds it helpful.     She has taken benzonatate in the past, but this caused significant mouth dryness so she can't tolerate this with Sjogrens. She has done OK with codeine cough syrup in the past.     She reports that she would not want to take tamiflu if this was confirmed as the flu.    Medications:  Current Outpatient Medications on File Prior to Visit   Medication Sig Dispense Refill    ALPRAZolam (XANAX) 0.5 MG tablet Take 0.5 mg by mouth 3 (three) times daily.      buPROPion (WELLBUTRIN) 100 MG tablet       busPIRone (BUSPAR) 10 MG tablet Take 10 mg by mouth 3 (three) times daily.      cevimeline (EVOXAC) 30 mg capsule Take 1 capsule (30 mg total) by mouth 3 (three) times daily. 270 capsule 3    CHOLINE ORAL Take by mouth.      drospirenone-ethinyl estradioL (JOSH) 3-0.02 mg per tablet Take continuously, skip placebo pills. 84 tablet 3    ergocalciferol, vitamin D2, (VITAMIN D ORAL) Take by mouth.      galcanezumab-gnlm (EMGALITY PEN) 120 mg/mL PnIj Starter dose, inject as shown by neuro nurse 2 mL 0    galcanezumab-gnlm (EMGALITY PEN) 120 mg/mL PnIj Inject 1 pen (120 mg) into the skin every 28 days. maintenance 1 mL 11    hydrOXYchloroQUINE (PLAQUENIL) 200 mg tablet Take 1 tablet (200 mg total) by mouth 2 (two) times daily. 180 tablet 3    Lactobac no.41/Bifidobact no.7 (PROBIOTIC-10 ORAL) Take by mouth. bromoline      methylphenidate HCl (RITALIN) 10  "MG tablet Take 10 mg by mouth once daily.      predniSONE (DELTASONE) 5 MG tablet Take 1 tablet (5 mg total) by mouth once daily. 30 tablet 11    rizatriptan (MAXALT) 10 MG tablet Take 1 tablet (10 mg total) by mouth daily as needed for Migraine. Take at migraine onset 10 tablet 3    thymol/chlorophyllin (CHLOROPHYLL ORAL) Take by mouth.      traMADoL (ULTRAM) 50 mg tablet Take 1 tablet (50 mg total) by mouth every 8 (eight) hours as needed. 90 tablet 3    TYROSINE ORAL Take by mouth.      VYVANSE 30 mg capsule Take 30 mg by mouth nightly.       No current facility-administered medications on file prior to visit.       Review of Systems:  Review of Systems   Constitutional: Positive for chills and fever.   HENT: Positive for rhinorrhea and sore throat.    Respiratory: Negative for shortness of breath.    Cardiovascular: Negative for chest pain.   Musculoskeletal: Positive for myalgias.   Allergic/Immunologic: Positive for environmental allergies.     Entered by patient and reviewed and updated during visit      Past Medical History:  Past Medical History:   Diagnosis Date    Allergy     previously on immunotherapy    Anxiety     Depression     Headache     Lumbago with sciatica, right side     Lumbar disc disease     L4-L5    Other chronic pain     Panic disorder     Positive CHELSEA (antinuclear antibody)     Purpura     Sjogren's syndrome     Vasculitis        Objective:    Vitals:  Vitals:    05/03/22 1452   Weight: 59.9 kg (132 lb)   Height: 5' 6" (1.676 m)       Physical Exam  Constitutional:       General: She is not in acute distress.     Appearance: She is well-developed.   HENT:      Head: Normocephalic and atraumatic.   Pulmonary:      Effort: Pulmonary effort is normal. No respiratory distress.   Neurological:      Mental Status: She is alert and oriented to person, place, and time.   Psychiatric:         Behavior: Behavior normal.         Thought Content: Thought content normal.         " Judgment: Judgment normal.           Julia Sargent MD  Internal Medicine

## 2022-05-05 LAB — CRYOGLOB SER QL: NORMAL

## 2022-05-05 NOTE — TELEPHONE ENCOUNTER
Left msg for patient to contact dermatology dept to schedule appointment at [phone].   Sent: 4/28/2020  12:31 PM CDT  To: [facility] Staff     This patient has an unusual rash that needs a biopsy per Dr. Buchanan in Rheum. She has sent pictures through the portal. Does have a positive CHELSEA but no other markers for lupus. Are you all doing biopsies at this time? Please advise.   Thank you-  Digna Armijo LPN   Bi-Rhombic Flap Text: The defect edges were debeveled with a #15 scalpel blade.  Given the location of the defect and the proximity to free margins a bi-rhombic flap was deemed most appropriate.  Using a sterile surgical marker, an appropriate rhombic flap was drawn incorporating the defect. The area thus outlined was incised deep to adipose tissue with a #15 scalpel blade.  The skin margins were undermined to an appropriate distance in all directions utilizing iris scissors.

## 2022-05-11 ENCOUNTER — PATIENT MESSAGE (OUTPATIENT)
Dept: RHEUMATOLOGY | Facility: CLINIC | Age: 42
End: 2022-05-11
Payer: COMMERCIAL

## 2022-05-12 ENCOUNTER — PATIENT MESSAGE (OUTPATIENT)
Dept: RHEUMATOLOGY | Facility: CLINIC | Age: 42
End: 2022-05-12
Payer: COMMERCIAL

## 2022-05-12 RX ORDER — PREDNISONE 5 MG/1
5-20 TABLET ORAL DAILY
Qty: 120 TABLET | Refills: 11 | Status: SHIPPED | OUTPATIENT
Start: 2022-05-12 | End: 2023-02-08 | Stop reason: SDUPTHER

## 2022-05-14 ENCOUNTER — PATIENT MESSAGE (OUTPATIENT)
Dept: RHEUMATOLOGY | Facility: CLINIC | Age: 42
End: 2022-05-14
Payer: COMMERCIAL

## 2022-05-19 ENCOUNTER — PATIENT MESSAGE (OUTPATIENT)
Dept: RHEUMATOLOGY | Facility: CLINIC | Age: 42
End: 2022-05-19
Payer: COMMERCIAL

## 2022-06-06 ENCOUNTER — DOCUMENTATION ONLY (OUTPATIENT)
Dept: PHARMACY | Facility: CLINIC | Age: 42
End: 2022-06-06
Payer: COMMERCIAL

## 2022-06-07 ENCOUNTER — PATIENT MESSAGE (OUTPATIENT)
Dept: RHEUMATOLOGY | Facility: CLINIC | Age: 42
End: 2022-06-07
Payer: COMMERCIAL

## 2022-06-22 ENCOUNTER — PATIENT MESSAGE (OUTPATIENT)
Dept: NEUROLOGY | Facility: CLINIC | Age: 42
End: 2022-06-22
Payer: COMMERCIAL

## 2022-06-22 RX ORDER — RIMEGEPANT SULFATE 75 MG/75MG
75 TABLET, ORALLY DISINTEGRATING ORAL ONCE AS NEEDED
Qty: 8 TABLET | Refills: 6 | Status: SHIPPED | OUTPATIENT
Start: 2022-06-22 | End: 2022-06-23

## 2022-06-27 ENCOUNTER — OFFICE VISIT (OUTPATIENT)
Dept: NEUROLOGY | Facility: CLINIC | Age: 42
End: 2022-06-27
Payer: COMMERCIAL

## 2022-06-27 VITALS
RESPIRATION RATE: 17 BRPM | BODY MASS INDEX: 21.21 KG/M2 | WEIGHT: 132 LBS | DIASTOLIC BLOOD PRESSURE: 75 MMHG | HEIGHT: 66 IN | HEART RATE: 97 BPM | SYSTOLIC BLOOD PRESSURE: 118 MMHG

## 2022-06-27 DIAGNOSIS — G43.709 CHRONIC MIGRAINE WITHOUT AURA WITHOUT STATUS MIGRAINOSUS, NOT INTRACTABLE: Primary | ICD-10-CM

## 2022-06-27 DIAGNOSIS — G47.9 TROUBLE IN SLEEPING: ICD-10-CM

## 2022-06-27 PROCEDURE — 3078F DIAST BP <80 MM HG: CPT | Mod: CPTII,S$GLB,, | Performed by: PHYSICIAN ASSISTANT

## 2022-06-27 PROCEDURE — 3008F PR BODY MASS INDEX (BMI) DOCUMENTED: ICD-10-PCS | Mod: CPTII,S$GLB,, | Performed by: PHYSICIAN ASSISTANT

## 2022-06-27 PROCEDURE — 3078F PR MOST RECENT DIASTOLIC BLOOD PRESSURE < 80 MM HG: ICD-10-PCS | Mod: CPTII,S$GLB,, | Performed by: PHYSICIAN ASSISTANT

## 2022-06-27 PROCEDURE — 99214 PR OFFICE/OUTPT VISIT, EST, LEVL IV, 30-39 MIN: ICD-10-PCS | Mod: S$GLB,,, | Performed by: PHYSICIAN ASSISTANT

## 2022-06-27 PROCEDURE — 1159F MED LIST DOCD IN RCRD: CPT | Mod: CPTII,S$GLB,, | Performed by: PHYSICIAN ASSISTANT

## 2022-06-27 PROCEDURE — 99999 PR PBB SHADOW E&M-EST. PATIENT-LVL IV: CPT | Mod: PBBFAC,,, | Performed by: PHYSICIAN ASSISTANT

## 2022-06-27 PROCEDURE — 3008F BODY MASS INDEX DOCD: CPT | Mod: CPTII,S$GLB,, | Performed by: PHYSICIAN ASSISTANT

## 2022-06-27 PROCEDURE — 1159F PR MEDICATION LIST DOCUMENTED IN MEDICAL RECORD: ICD-10-PCS | Mod: CPTII,S$GLB,, | Performed by: PHYSICIAN ASSISTANT

## 2022-06-27 PROCEDURE — 1160F PR REVIEW ALL MEDS BY PRESCRIBER/CLIN PHARMACIST DOCUMENTED: ICD-10-PCS | Mod: CPTII,S$GLB,, | Performed by: PHYSICIAN ASSISTANT

## 2022-06-27 PROCEDURE — 99214 OFFICE O/P EST MOD 30 MIN: CPT | Mod: S$GLB,,, | Performed by: PHYSICIAN ASSISTANT

## 2022-06-27 PROCEDURE — 1160F RVW MEDS BY RX/DR IN RCRD: CPT | Mod: CPTII,S$GLB,, | Performed by: PHYSICIAN ASSISTANT

## 2022-06-27 PROCEDURE — 3074F PR MOST RECENT SYSTOLIC BLOOD PRESSURE < 130 MM HG: ICD-10-PCS | Mod: CPTII,S$GLB,, | Performed by: PHYSICIAN ASSISTANT

## 2022-06-27 PROCEDURE — 99999 PR PBB SHADOW E&M-EST. PATIENT-LVL IV: ICD-10-PCS | Mod: PBBFAC,,, | Performed by: PHYSICIAN ASSISTANT

## 2022-06-27 PROCEDURE — 3074F SYST BP LT 130 MM HG: CPT | Mod: CPTII,S$GLB,, | Performed by: PHYSICIAN ASSISTANT

## 2022-06-27 RX ORDER — TIZANIDINE 2 MG/1
TABLET ORAL
Qty: 30 TABLET | Refills: 3 | Status: SHIPPED | OUTPATIENT
Start: 2022-06-27 | End: 2022-09-16 | Stop reason: ALTCHOICE

## 2022-06-27 NOTE — PROGRESS NOTES
"  [facility]  [address] [facility] [address]  [place] [address]  Phone:[phone]  Fax: [phone]  Follow up visit     Patient Name: Shamika Young  : 1980  MRN:  [id number]  Today: 2022   LOV: 2022  chief complaint: Headache    PCP: Nathan Núñez MD.    Assessment:   Shamika Young is a 41 y.o. 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  DUFFY appear chronic migrainous. Lack of sleep possibly contributes.       Review:    ICD-10-CM ICD-9-CM   1. Chronic migraine without aura without status migrainosus, not intractable  G43.709 346.70   2. Trouble in sleeping  G47.9 780.50         Plan:   Continue to track DUFFY  If despite intervention HA worsen, will get updated imaging study              For HA Prevention:  1) continue emgality  2 mood medications per other providers      For HA :  Has nurtec, may need to consider ubrelvy   Will avoid triptans d/t PMHx of vasculitis    To break up Headaches:  Can consider nerve blocks (not discussed)   No vistaril d/t possibly prolongation of QTc (she has had this in the past, and would need stronger dose of medicine, noted interaction with plaquenil)   She has steroids that she takes PRN for her other rheum conditions, could take "one" for headaches  Will try adding zanaflex (tizanadine) up to 4 mg Q2h before bed for HA/neck pain/trouble in sleep, noting again could interact with plaquenil causing prolongation of the QTc, she states she is willing to try. She will not take with alcohol and not drive with it. If any CP, palpitations, etc, will have her stop immediately.     Other:  N/A           All test results as well as any necessary instructions were reviewed and discussed with patient.    Review:  Orders Placed This Encounter    tiZANidine (ZANAFLEX) 2 MG tablet         Patient to " "return to PCP/other specialists for all other problems  Patient to continue on all medications as Rx'd   A detailed AVS was provided to the patient with patient readback   RTO-4 months   The patient indicates understanding of these issues and agrees to the plan.    HPI:   Shamika Young is a 41 y.o. 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     At last visit: wrote for emgality and nurtec.   HA weather related (?) and sleep deprivation "I stay up late with my kids watching television"  Emgality helping   Stopped taking BC  Pain along frontalis  nurtec is hit/miss  8 HD in past month  Severity: 4-8/10  HA last: 12-15 hours     HA HPI:  Start:Since HS, took triptans, and seemed to aide, and in 2021 returned  (frequency), started new BC about a month and noticed frequency intensified   History of trauma (no), History of CNS infection (no), History of Stroke (no)  Location:frontalis and retroorbital and more to the top of the head, occasionally down the cheek (right)  Severity: range: 3-6/10  Duration:8-12 hours in a day   Frequency:12-15 HD a month   Associated factors (bolded positive) WITH HA  (or migraine): Nausea, vomiting, photophobia, phonophobia, tinnitus, scalp pain, vision loss, diplopia, scintillations, eye pain, jaw pain, weakness?    Tried:maxalt, no preventative   Triggers (in bold): stress, lack of sleep, too much caffeine(soda and coffee), too little caffeine, weather change, seasonal change, strong odours, bright lights, sunlight, food  Last HA: yesterday  Positives in bold: Hx of Kidney Stones, asthma (as a child), GI bleed, osteoporosis, CAD/MI, CVA/TIA, DM    Imaging on file: none   Therapies tried in past: (failures to be marked, if known---why did it fail?)  vyvanse  wellbutrin  maxalt  Prednisone   buspar  zofran  imitrex  depakote  tizandine      Medication " Reconciliation:   Current Outpatient Medications   Medication Sig Dispense Refill    ALPRAZolam (XANAX) 0.5 MG tablet Take 0.5 mg by mouth 3 (three) times daily.      buPROPion (WELLBUTRIN) 100 MG tablet       busPIRone (BUSPAR) 10 MG tablet Take 10 mg by mouth 3 (three) times daily.      cevimeline (EVOXAC) 30 mg capsule TAKE ONE CAPSULE BY MOUTH THREE TIMES DAILY 270 capsule 3    CHOLINE ORAL Take by mouth.      ergocalciferol, vitamin D2, (VITAMIN D ORAL) Take by mouth.      galcanezumab-gnlm (EMGALITY PEN) 120 mg/mL PnIj Inject 1 pen (120 mg) into the skin every 28 days. maintenance 1 mL 11    hydrOXYchloroQUINE (PLAQUENIL) 200 mg tablet Take 1 tablet (200 mg total) by mouth 2 (two) times a day. 60 tablet 11    hydrOXYchloroQUINE (PLAQUENIL) 200 mg tablet TAKE ONE TABLET BY MOUTH TWICE DAILY 180 tablet 3    Lactobac no.41/Bifidobact no.7 (PROBIOTIC-10 ORAL) Take by mouth. bromoline      methylphenidate HCl (RITALIN) 10 MG tablet Take 10 mg by mouth once daily.      predniSONE (DELTASONE) 5 MG tablet Take 1-4 tablets (5-20 mg total) by mouth once daily. 120 tablet 11    thymol/chlorophyllin (CHLOROPHYLL ORAL) Take by mouth.      traMADoL (ULTRAM) 50 mg tablet Take 1 tablet (50 mg total) by mouth every 8 (eight) hours as needed. 90 tablet 3    TYROSINE ORAL Take by mouth.      VYVANSE 30 mg capsule Take 30 mg by mouth nightly.      tiZANidine (ZANAFLEX) 2 MG tablet 1-2 pills approx 1-2 hours before bed, as needed for bad headache and neck pain 30 tablet 3     No current facility-administered medications for this visit.     Review of patient's allergies indicates:   Allergen Reactions    Tree nut Anaphylaxis    Tree nuts Anaphylaxis    Biaxin [clarithromycin] Other (See Comments)     GI symptoms     Azithromycin Rash    Ceclor [cefaclor] Rash    Ciprofloxacin Rash    Penicillins Rash    Tetracyclines Hives       PMHx:  Past Medical History:   Diagnosis Date    Allergy     previously on  immunotherapy    Anxiety     Depression     Headache     Lumbago with sciatica, right side     Lumbar disc disease     L4-L5    Other chronic pain     Panic disorder     Positive CHELSEA (antinuclear antibody)     Purpura     Sjogren's syndrome     Vasculitis      Past Surgical History:   Procedure Laterality Date    CYST REMOVAL  2012    mandibular    HYSTEROSCOPY N/A 7/17/2020    Procedure: HYSTEROSCOPY/ with IUD removal;  Surgeon: Cherry Christine MD;  Location: [facility];  Service: OB/GYN;  Laterality: N/A;    INTRAUTERINE DEVICE INSERTION  7/17/2020    Procedure: INSERTION, INTRAUTERINE DEVICE;  Surgeon: Cherry Christine MD;  Location: [facility];  Service: OB/GYN;;    lumbar spine procedure      2004/2014    REMOVAL OF INTRAUTERINE DEVICE (IUD)  7/17/2020    Procedure: REMOVAL, INTRAUTERINE DEVICE;  Surgeon: Cherry Christine MD;  Location: [facility];  Service: OB/GYN;;    TONSILLECTOMY AND ADENOIDECTOMY      7th grade       Fhx:  Family History   Problem Relation Age of Onset    Multiple sclerosis Mother     Heart disease Mother     Diabetes Mother     Hypertension Mother     Heart disease Father     Hyperlipidemia Father     Glaucoma Neg Hx     Retinal detachment Neg Hx     Macular degeneration Neg Hx        Shx:   Social History     Socioeconomic History    Marital status:    Tobacco Use    Smoking status: Former Smoker    Smokeless tobacco: Never Used   Substance and Sexual Activity    Alcohol use: Yes     Comment: socially    Drug use: Never           Labs:   Reviewed in chart     Imaging:   Reviewed in chart       Other testing:  Reviewed in chart     Note: I have independently reviewed any/all imaging/labs/tests and agree with the report (s) as documented.  Any discrepancies will be as noted/demarcated by free text.  AUGIE MG 6/27/2022                     ROS:   Review Of Systems (questions asked, positive or additions in BOLD)  Gen: Weight change, fatigue/malaise, pyrexia  "  HEENT: Tinnitus, headache,  blurred vision, eye pain, diplopia, photophobia,  nose bleeds, congestion, sore throat, jaw pain, scalp pain, neck stiffness   Card: Palpitations, CP   Pulm: SOB, cough   Vas: Easy bruising, easy bleeding   GI: N/V/D/C, incontinence, hematemesis, hematochezia    : incontinence, hematuria   Endocrine: Temp intolerance, polyuria, polydipsia   M/S: Neck pain, myalgia, back pain, joint pain, falls    Neuro: PER HPI   PSY: Memory loss, confusion, depression, anxiety, trouble in sleep          EXAM:   Exam remains consistent   /75 (BP Location: Right arm, Patient Position: Sitting, BP Method: Medium (Automatic))   Pulse 97   Resp 17   Ht 5' 6" (1.676 m)   Wt 59.9 kg (132 lb)   BMI 21.31 kg/m²    GEN:  NAD  HEENT: NC/AT, Frontalis was NTTP, temporalis was NTTP,  nares patent, dentition appropriate neck supple, trachea midline      EXTREM:    no edema present.    NEURO:  Mental Status:  Awake, alert and appropriately oriented to time, place, and person.  Normal attention and concentration.  Speech is fluent and appropriate language function (I.e., comprehension, repetition, etc).     Cranial Nerves:    Extraocular movements are intact and without nystagmus.  Visual fields are full to confrontation testing.   Facial movement is symmetric.  Facial sensation is intact.  Hearing is normal. Uvula in midline. FROM of neck in all (6) directions, shoulder shrug symmetrical. Tongue in midline without fasiculation.     Motor:  RUE:appropriate against gravity and medium force as tested 5/5              LUE: appropriate against gravity and medium force as tested 5/5              RLE:appropriate against gravity and medium force as tested 5/5              LLE: appropriate against gravity and medium force as tested 5/5  Tremor/pronator drift not apparent.     finger extension strength was strong bilat     Sensory:  RUE  intact light touch, proprioception and temperature  LUE intact light touch, " proprioception and temperature    RLE intact light touch  LLE intact light touch      DTR's:                                            R              L                  Knee jerk 2+ 2+      Coordination:  FTN-WNL.      Gait and Stance: Normal manner of stance and gait function testing. Romberg was negative         This document has been electronically signed by  Eduar MOJICARosalinda Serra MPA, PA-C on 6/27/2022, I have personally typed this message using the EMR.       Dr Daniel MD was available during today's visit.     Personal Protective Equipment:    Personal Protective Equipment was used during this encounter including:KN95 and non latex gloves.          CC: Nathan Núñez MD

## 2022-07-08 DIAGNOSIS — D89.1 CRYOGLOBULINEMIC VASCULITIS: Primary | ICD-10-CM

## 2022-07-11 ENCOUNTER — TELEPHONE (OUTPATIENT)
Dept: RHEUMATOLOGY | Facility: CLINIC | Age: 42
End: 2022-07-11
Payer: COMMERCIAL

## 2022-07-11 ENCOUNTER — TELEPHONE (OUTPATIENT)
Dept: INFUSION THERAPY | Facility: HOSPITAL | Age: 42
End: 2022-07-11
Payer: COMMERCIAL

## 2022-07-11 ENCOUNTER — PATIENT MESSAGE (OUTPATIENT)
Dept: RHEUMATOLOGY | Facility: CLINIC | Age: 42
End: 2022-07-11
Payer: COMMERCIAL

## 2022-07-11 NOTE — TELEPHONE ENCOUNTER
COVID swab + 7/10/22  Must be completely well before Rituxan   Patient already 5 days into illness.

## 2022-07-18 ENCOUNTER — TELEPHONE (OUTPATIENT)
Dept: RHEUMATOLOGY | Facility: CLINIC | Age: 42
End: 2022-07-18
Payer: COMMERCIAL

## 2022-07-18 ENCOUNTER — INFUSION (OUTPATIENT)
Dept: INFUSION THERAPY | Facility: HOSPITAL | Age: 42
End: 2022-07-18
Attending: INTERNAL MEDICINE
Payer: COMMERCIAL

## 2022-07-18 VITALS
HEART RATE: 98 BPM | RESPIRATION RATE: 16 BRPM | WEIGHT: 135.13 LBS | DIASTOLIC BLOOD PRESSURE: 65 MMHG | TEMPERATURE: 98 F | OXYGEN SATURATION: 99 % | SYSTOLIC BLOOD PRESSURE: 130 MMHG | BODY MASS INDEX: 21.72 KG/M2 | HEIGHT: 66 IN

## 2022-07-18 DIAGNOSIS — D89.1 CRYOGLOBULINEMIC VASCULITIS: Primary | ICD-10-CM

## 2022-07-18 DIAGNOSIS — M35.00 SJOGREN'S SYNDROME WITHOUT EXTRAGLANDULAR INVOLVEMENT: ICD-10-CM

## 2022-07-18 PROCEDURE — 96415 CHEMO IV INFUSION ADDL HR: CPT | Mod: PN

## 2022-07-18 PROCEDURE — 25000003 PHARM REV CODE 250: Mod: PN | Performed by: INTERNAL MEDICINE

## 2022-07-18 PROCEDURE — A4216 STERILE WATER/SALINE, 10 ML: HCPCS | Mod: PN | Performed by: INTERNAL MEDICINE

## 2022-07-18 PROCEDURE — 96375 TX/PRO/DX INJ NEW DRUG ADDON: CPT | Mod: PN

## 2022-07-18 PROCEDURE — 25000003 PHARM REV CODE 250: Mod: PN

## 2022-07-18 PROCEDURE — 63600175 PHARM REV CODE 636 W HCPCS: Mod: PN | Performed by: INTERNAL MEDICINE

## 2022-07-18 PROCEDURE — 96413 CHEMO IV INFUSION 1 HR: CPT | Mod: PN

## 2022-07-18 RX ORDER — ACETAMINOPHEN 325 MG/1
TABLET ORAL
Status: DISCONTINUED
Start: 2022-07-18 | End: 2022-07-18 | Stop reason: HOSPADM

## 2022-07-18 RX ORDER — SODIUM CHLORIDE 0.9 % (FLUSH) 0.9 %
10 SYRINGE (ML) INJECTION
Status: CANCELLED | OUTPATIENT
Start: 2022-07-18

## 2022-07-18 RX ORDER — METHYLPREDNISOLONE SOD SUCC 125 MG
100 VIAL (EA) INJECTION
Status: CANCELLED | OUTPATIENT
Start: 2022-07-19

## 2022-07-18 RX ORDER — ACETAMINOPHEN 325 MG/1
650 TABLET ORAL
Status: CANCELLED | OUTPATIENT
Start: 2022-07-18

## 2022-07-18 RX ORDER — SODIUM CHLORIDE 0.9 % (FLUSH) 0.9 %
10 SYRINGE (ML) INJECTION
Status: DISCONTINUED | OUTPATIENT
Start: 2022-07-18 | End: 2022-07-18 | Stop reason: HOSPADM

## 2022-07-18 RX ORDER — SODIUM CHLORIDE 0.9 % (FLUSH) 0.9 %
10 SYRINGE (ML) INJECTION
Status: CANCELLED | OUTPATIENT
Start: 2022-07-19

## 2022-07-18 RX ORDER — ACETAMINOPHEN 325 MG/1
650 TABLET ORAL
Status: CANCELLED | OUTPATIENT
Start: 2022-07-19

## 2022-07-18 RX ORDER — HEPARIN 100 UNIT/ML
500 SYRINGE INTRAVENOUS
Status: DISCONTINUED | OUTPATIENT
Start: 2022-07-18 | End: 2022-07-18 | Stop reason: HOSPADM

## 2022-07-18 RX ORDER — HEPARIN 100 UNIT/ML
500 SYRINGE INTRAVENOUS
Status: CANCELLED | OUTPATIENT
Start: 2022-07-18

## 2022-07-18 RX ORDER — ACETAMINOPHEN 325 MG/1
650 TABLET ORAL
Status: COMPLETED | OUTPATIENT
Start: 2022-07-18 | End: 2022-07-18

## 2022-07-18 RX ORDER — METHYLPREDNISOLONE SOD SUCC 125 MG
100 VIAL (EA) INJECTION
Status: CANCELLED | OUTPATIENT
Start: 2022-07-18

## 2022-07-18 RX ORDER — HEPARIN 100 UNIT/ML
500 SYRINGE INTRAVENOUS
Status: CANCELLED | OUTPATIENT
Start: 2022-07-19

## 2022-07-18 RX ORDER — DIPHENHYDRAMINE HCL 25 MG
25 CAPSULE ORAL
Status: CANCELLED | OUTPATIENT
Start: 2022-07-19

## 2022-07-18 RX ORDER — DIPHENHYDRAMINE HCL 25 MG
25 CAPSULE ORAL
Status: CANCELLED | OUTPATIENT
Start: 2022-07-18

## 2022-07-18 RX ORDER — DIPHENHYDRAMINE HCL 25 MG
CAPSULE ORAL
Status: COMPLETED
Start: 2022-07-18 | End: 2022-07-18

## 2022-07-18 RX ORDER — DIPHENHYDRAMINE HCL 25 MG
25 CAPSULE ORAL
Status: COMPLETED | OUTPATIENT
Start: 2022-07-18 | End: 2022-07-18

## 2022-07-18 RX ORDER — METHYLPREDNISOLONE SOD SUCC 125 MG
100 VIAL (EA) INJECTION
Status: COMPLETED | OUTPATIENT
Start: 2022-07-18 | End: 2022-07-18

## 2022-07-18 RX ADMIN — DIPHENHYDRAMINE HYDROCHLORIDE 25 MG: 25 CAPSULE ORAL at 11:07

## 2022-07-18 RX ADMIN — METHYLPREDNISOLONE SODIUM SUCCINATE 100 MG: 125 INJECTION, POWDER, FOR SOLUTION INTRAMUSCULAR; INTRAVENOUS at 11:07

## 2022-07-18 RX ADMIN — Medication 10 ML: at 11:07

## 2022-07-18 RX ADMIN — SODIUM CHLORIDE: 0.9 INJECTION, SOLUTION INTRAVENOUS at 11:07

## 2022-07-18 RX ADMIN — SODIUM CHLORIDE 1000 MG: 0.9 INJECTION, SOLUTION INTRAVENOUS at 12:07

## 2022-07-18 RX ADMIN — ACETAMINOPHEN 650 MG: 325 TABLET, FILM COATED ORAL at 11:07

## 2022-07-18 NOTE — PLAN OF CARE
Problem: Pain Chronic (Persistent)  Goal: Acceptable Pain Control and Functional Ability  Outcome: Ongoing, Progressing     Problem: Adult Inpatient Plan of Care  Goal: Patient-Specific Goal (Individualized)  Outcome: Ongoing, Progressing   Tolerated infusion well today Able to be d/c to home  Discharge instructions given with copy of avs and pt ambulated to private vehicle without difficulty NAD

## 2022-07-19 ENCOUNTER — PATIENT MESSAGE (OUTPATIENT)
Dept: RHEUMATOLOGY | Facility: CLINIC | Age: 42
End: 2022-07-19
Payer: COMMERCIAL

## 2022-08-01 ENCOUNTER — INFUSION (OUTPATIENT)
Dept: INFUSION THERAPY | Facility: HOSPITAL | Age: 42
End: 2022-08-01
Attending: INTERNAL MEDICINE
Payer: COMMERCIAL

## 2022-08-01 VITALS
WEIGHT: 137.13 LBS | OXYGEN SATURATION: 100 % | SYSTOLIC BLOOD PRESSURE: 125 MMHG | HEIGHT: 66 IN | BODY MASS INDEX: 22.04 KG/M2 | HEART RATE: 107 BPM | RESPIRATION RATE: 18 BRPM | DIASTOLIC BLOOD PRESSURE: 74 MMHG

## 2022-08-01 DIAGNOSIS — D89.1 CRYOGLOBULINEMIC VASCULITIS: ICD-10-CM

## 2022-08-01 DIAGNOSIS — M35.00 SJOGREN'S SYNDROME WITHOUT EXTRAGLANDULAR INVOLVEMENT: Primary | ICD-10-CM

## 2022-08-01 PROCEDURE — 96415 CHEMO IV INFUSION ADDL HR: CPT | Mod: PN

## 2022-08-01 PROCEDURE — 96375 TX/PRO/DX INJ NEW DRUG ADDON: CPT | Mod: PN

## 2022-08-01 PROCEDURE — 25000003 PHARM REV CODE 250: Mod: PN | Performed by: INTERNAL MEDICINE

## 2022-08-01 PROCEDURE — 96413 CHEMO IV INFUSION 1 HR: CPT | Mod: PN

## 2022-08-01 PROCEDURE — 63600175 PHARM REV CODE 636 W HCPCS: Mod: TB,PN | Performed by: INTERNAL MEDICINE

## 2022-08-01 RX ORDER — DIPHENHYDRAMINE HCL 25 MG
25 CAPSULE ORAL
Status: COMPLETED | OUTPATIENT
Start: 2022-08-01 | End: 2022-08-01

## 2022-08-01 RX ORDER — SODIUM CHLORIDE 0.9 % (FLUSH) 0.9 %
10 SYRINGE (ML) INJECTION
Status: CANCELLED | OUTPATIENT
Start: 2022-08-02

## 2022-08-01 RX ORDER — HEPARIN 100 UNIT/ML
500 SYRINGE INTRAVENOUS
Status: CANCELLED | OUTPATIENT
Start: 2022-08-02

## 2022-08-01 RX ORDER — ACETAMINOPHEN 325 MG/1
650 TABLET ORAL
Status: COMPLETED | OUTPATIENT
Start: 2022-08-01 | End: 2022-08-01

## 2022-08-01 RX ORDER — METHYLPREDNISOLONE SOD SUCC 125 MG
100 VIAL (EA) INJECTION
Status: CANCELLED | OUTPATIENT
Start: 2022-08-02

## 2022-08-01 RX ORDER — ACETAMINOPHEN 325 MG/1
650 TABLET ORAL
Status: CANCELLED | OUTPATIENT
Start: 2022-08-02

## 2022-08-01 RX ORDER — SODIUM CHLORIDE 0.9 % (FLUSH) 0.9 %
10 SYRINGE (ML) INJECTION
Status: DISCONTINUED | OUTPATIENT
Start: 2022-08-01 | End: 2022-08-01 | Stop reason: HOSPADM

## 2022-08-01 RX ORDER — METHYLPREDNISOLONE SOD SUCC 125 MG
100 VIAL (EA) INJECTION
Status: COMPLETED | OUTPATIENT
Start: 2022-08-01 | End: 2022-08-01

## 2022-08-01 RX ORDER — DIPHENHYDRAMINE HCL 25 MG
25 CAPSULE ORAL
Status: CANCELLED | OUTPATIENT
Start: 2022-08-02

## 2022-08-01 RX ADMIN — SODIUM CHLORIDE 1000 MG: 0.9 INJECTION, SOLUTION INTRAVENOUS at 11:08

## 2022-08-01 RX ADMIN — DIPHENHYDRAMINE HYDROCHLORIDE 25 MG: 25 CAPSULE ORAL at 11:08

## 2022-08-01 RX ADMIN — SODIUM CHLORIDE: 0.9 INJECTION, SOLUTION INTRAVENOUS at 10:08

## 2022-08-01 RX ADMIN — ACETAMINOPHEN 650 MG: 325 TABLET, FILM COATED ORAL at 11:08

## 2022-08-01 RX ADMIN — METHYLPREDNISOLONE SODIUM SUCCINATE 100 MG: 125 INJECTION, POWDER, FOR SOLUTION INTRAMUSCULAR; INTRAVENOUS at 11:08

## 2022-08-01 NOTE — PLAN OF CARE
Problem: Adult Inpatient Plan of Care  Goal: Patient-Specific Goal (Individualized)  Outcome: Ongoing, Progressing  Flowsheets (Taken 8/1/2022 1045)  Anxieties, Fears or Concerns: none voiced  Individualized Care Needs: recliner, blanket, pillow, isolated chair (for a more quiet experience)  Patient-Specific Goals (Include Timeframe): no s/s rxn during txt     Problem: Fatigue  Goal: Improved Activity Tolerance  Intervention: Promote Improved Energy  Flowsheets (Taken 8/1/2022 1045)  Fatigue Management: frequent rest breaks encouraged  Sleep/Rest Enhancement:   natural light exposure provided   noise level reduced  Activity Management:   Ambulated -L4   Ambulated in [place] - L4

## 2022-08-01 NOTE — PLAN OF CARE
Problem: Adult Inpatient Plan of Care  Goal: Plan of Care Review  Outcome: Ongoing, Progressing  Flowsheets (Taken 8/1/2022 4679)  Plan of Care Reviewed With: patient     Patient tolerated treatment well. IV flushed with blood return, saline locked, and removed. AVS provided per portal and reviewed. Ambulates per self.

## 2022-08-16 ENCOUNTER — PATIENT MESSAGE (OUTPATIENT)
Dept: RHEUMATOLOGY | Facility: CLINIC | Age: 42
End: 2022-08-16
Payer: COMMERCIAL

## 2022-08-17 NOTE — TELEPHONE ENCOUNTER
While your primary manifestations has been skin, all vascultitis is systemic. The vessels are primary location that is experiencing inflammation. This will typically occur in cooler extremities and be a disease of the small and medium sized vessels but this does not exclude an effect on larger vessels including the brain. I don't see many articles reporting this, but I would not make the statement that your vaculitis is only the skin.     Will check with neuro on this.   Dr. MOJICA

## 2022-08-18 ENCOUNTER — PATIENT OUTREACH (OUTPATIENT)
Dept: RHEUMATOLOGY | Facility: CLINIC | Age: 42
End: 2022-08-18
Payer: COMMERCIAL

## 2022-08-18 ENCOUNTER — PATIENT MESSAGE (OUTPATIENT)
Dept: RHEUMATOLOGY | Facility: CLINIC | Age: 42
End: 2022-08-18
Payer: COMMERCIAL

## 2022-08-18 DIAGNOSIS — F41.9 ANXIETY: ICD-10-CM

## 2022-08-18 DIAGNOSIS — D89.1 CRYOGLOBULINEMIC VASCULITIS: Primary | ICD-10-CM

## 2022-08-18 DIAGNOSIS — M35.00 SJOGREN'S SYNDROME WITHOUT EXTRAGLANDULAR INVOLVEMENT: ICD-10-CM

## 2022-08-18 PROCEDURE — 99358 PROLONG SERVICE W/O CONTACT: CPT | Mod: S$GLB,,, | Performed by: INTERNAL MEDICINE

## 2022-08-18 PROCEDURE — 99358 PR PROLONGED SERV,NO CONTACT,1ST HR: ICD-10-PCS | Mod: S$GLB,,, | Performed by: INTERNAL MEDICINE

## 2022-08-18 NOTE — PROGRESS NOTES
Received email about patient having migraines. She was wanting to restart triptan (previously taking) this was discontinued by Neurology given hx of vasculitis. Patient wanted clarification and clearance from Rheumatology as to whether her Vasculitis would be affected by triptans . Reviewed data with Cryoglobulinemic vasculitis as it is typically not associated with central nervous system vasculitis but did find some reference to rare instances. More concerning is we know CryoVas is small and medium vessel disease and in her case secondary to Sjogrens I think the risk outweighs the benefits in this scenario.     Reviewed [facility] records again on her previous biospy  Reached out to Neurology to discuss the case. The general consideration is that there are many safer option for them to attempt.     Responded to patient

## 2022-09-12 ENCOUNTER — PATIENT MESSAGE (OUTPATIENT)
Dept: NEUROLOGY | Facility: CLINIC | Age: 42
End: 2022-09-12
Payer: COMMERCIAL

## 2022-09-16 ENCOUNTER — OFFICE VISIT (OUTPATIENT)
Dept: NEUROLOGY | Facility: CLINIC | Age: 42
End: 2022-09-16
Payer: COMMERCIAL

## 2022-09-16 VITALS
HEART RATE: 104 BPM | DIASTOLIC BLOOD PRESSURE: 85 MMHG | BODY MASS INDEX: 22.02 KG/M2 | WEIGHT: 137 LBS | HEIGHT: 66 IN | RESPIRATION RATE: 17 BRPM | SYSTOLIC BLOOD PRESSURE: 116 MMHG

## 2022-09-16 DIAGNOSIS — M54.2 TRIGGER POINT OF NECK: ICD-10-CM

## 2022-09-16 DIAGNOSIS — M54.81 OCCIPITAL NEURALGIA, UNSPECIFIED LATERALITY: ICD-10-CM

## 2022-09-16 DIAGNOSIS — G43.709 CHRONIC MIGRAINE WITHOUT AURA WITHOUT STATUS MIGRAINOSUS, NOT INTRACTABLE: Primary | ICD-10-CM

## 2022-09-16 PROCEDURE — 3079F DIAST BP 80-89 MM HG: CPT | Mod: CPTII,S$GLB,, | Performed by: PHYSICIAN ASSISTANT

## 2022-09-16 PROCEDURE — 99213 PR OFFICE/OUTPT VISIT, EST, LEVL III, 20-29 MIN: ICD-10-PCS | Mod: 25,S$GLB,, | Performed by: PHYSICIAN ASSISTANT

## 2022-09-16 PROCEDURE — 20553 NJX 1/MLT TRIGGER POINTS 3/>: CPT | Mod: S$GLB,,, | Performed by: PHYSICIAN ASSISTANT

## 2022-09-16 PROCEDURE — 1160F PR REVIEW ALL MEDS BY PRESCRIBER/CLIN PHARMACIST DOCUMENTED: ICD-10-PCS | Mod: CPTII,S$GLB,, | Performed by: PHYSICIAN ASSISTANT

## 2022-09-16 PROCEDURE — 99213 OFFICE O/P EST LOW 20 MIN: CPT | Mod: 25,S$GLB,, | Performed by: PHYSICIAN ASSISTANT

## 2022-09-16 PROCEDURE — 1159F PR MEDICATION LIST DOCUMENTED IN MEDICAL RECORD: ICD-10-PCS | Mod: CPTII,S$GLB,, | Performed by: PHYSICIAN ASSISTANT

## 2022-09-16 PROCEDURE — 3008F BODY MASS INDEX DOCD: CPT | Mod: CPTII,S$GLB,, | Performed by: PHYSICIAN ASSISTANT

## 2022-09-16 PROCEDURE — 3074F PR MOST RECENT SYSTOLIC BLOOD PRESSURE < 130 MM HG: ICD-10-PCS | Mod: CPTII,S$GLB,, | Performed by: PHYSICIAN ASSISTANT

## 2022-09-16 PROCEDURE — 1159F MED LIST DOCD IN RCRD: CPT | Mod: CPTII,S$GLB,, | Performed by: PHYSICIAN ASSISTANT

## 2022-09-16 PROCEDURE — 99999 PR PBB SHADOW E&M-EST. PATIENT-LVL IV: CPT | Mod: PBBFAC,,, | Performed by: PHYSICIAN ASSISTANT

## 2022-09-16 PROCEDURE — 3074F SYST BP LT 130 MM HG: CPT | Mod: CPTII,S$GLB,, | Performed by: PHYSICIAN ASSISTANT

## 2022-09-16 PROCEDURE — 3079F PR MOST RECENT DIASTOLIC BLOOD PRESSURE 80-89 MM HG: ICD-10-PCS | Mod: CPTII,S$GLB,, | Performed by: PHYSICIAN ASSISTANT

## 2022-09-16 PROCEDURE — 99999 PR PBB SHADOW E&M-EST. PATIENT-LVL IV: ICD-10-PCS | Mod: PBBFAC,,, | Performed by: PHYSICIAN ASSISTANT

## 2022-09-16 PROCEDURE — 64405 PR NERVE BLOCK INJ, ANES/STEROID, OCCIPITAL: ICD-10-PCS | Mod: 50,59,S$GLB, | Performed by: PHYSICIAN ASSISTANT

## 2022-09-16 PROCEDURE — 20553 PR INJECT TRIGGER POINTS, > 3: ICD-10-PCS | Mod: S$GLB,,, | Performed by: PHYSICIAN ASSISTANT

## 2022-09-16 PROCEDURE — 3008F PR BODY MASS INDEX (BMI) DOCUMENTED: ICD-10-PCS | Mod: CPTII,S$GLB,, | Performed by: PHYSICIAN ASSISTANT

## 2022-09-16 PROCEDURE — 64450 NJX AA&/STRD OTHER PN/BRANCH: CPT | Mod: 50,59,S$GLB, | Performed by: PHYSICIAN ASSISTANT

## 2022-09-16 PROCEDURE — 64450 PR NERVE BLOCK INJ, ANES/STEROID, OTHER PERIPHERAL: ICD-10-PCS | Mod: 50,59,S$GLB, | Performed by: PHYSICIAN ASSISTANT

## 2022-09-16 PROCEDURE — 64405 NJX AA&/STRD GR OCPL NRV: CPT | Mod: 50,59,S$GLB, | Performed by: PHYSICIAN ASSISTANT

## 2022-09-16 PROCEDURE — 1160F RVW MEDS BY RX/DR IN RCRD: CPT | Mod: CPTII,S$GLB,, | Performed by: PHYSICIAN ASSISTANT

## 2022-09-16 RX ORDER — BUPIVACAINE HYDROCHLORIDE 2.5 MG/ML
12 INJECTION, SOLUTION EPIDURAL; INFILTRATION; INTRACAUDAL ONCE
Status: COMPLETED | OUTPATIENT
Start: 2022-09-16 | End: 2022-09-16

## 2022-09-16 RX ADMIN — BUPIVACAINE HYDROCHLORIDE 30 MG: 2.5 INJECTION, SOLUTION EPIDURAL; INFILTRATION; INTRACAUDAL at 08:09

## 2022-09-16 NOTE — PROGRESS NOTES
"   [facility]  [address] [facility] [address]  [place] [address]  Phone:[phone]  Fax: [phone]  Follow up visit     Patient Name: Shamika Young  : 1980  MRN:  [id number]  Today: 2022   LOV: 2022  chief complaint: Headache    PCP: Nathan Núñez MD.    Assessment:   Shamika Young is a 41 y.o. 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  DUFFY appear chronic migrainous. Lack of sleep possibly contributes. She also has trigger point tenderness and occipital neuralgia, recently worsened, would like relief today.       Review:    ICD-10-CM ICD-9-CM   1. Chronic migraine without aura without status migrainosus, not intractable  G43.709 346.70   2. Occipital neuralgia, unspecified laterality  M54.81 723.8   3. Trigger point of neck  M54.2 723.1           Plan:   Continue to track DUFFY  If despite intervention HA worsen, will get updated imaging study              For HA Prevention:  1) continue emgality  2 mood medications per other providers      For HA :  Has nurtec, may need to consider ubrelvy   Will avoid triptans d/t PMHx of vasculitis    To break up Headaches:  GONB and TPI, medically necessary (note below)     No vistaril d/t possibly prolongation of QTc (she has had this in the past, and would need stronger dose of medicine, noted interaction with plaquenil)   She has steroids that she takes PRN for her other rheum conditions, could take "one" for headaches    Stop zanaflex     Other:  N/A           All test results as well as any necessary instructions were reviewed and discussed with patient.    Review:  Orders Placed This Encounter    BUPivacaine (PF) 0.25% (2.5 mg/ml) injection 30 mg           Patient to return to PCP/other specialists for all other problems  Patient to continue on all medications as Rx'd   A " "detailed AVS was provided to the patient with patient readback   RTO-as needed, states she will self schedule within the next few months   The patient indicates understanding of these issues and agrees to the plan.    HPI:   Shamika Young is a 41 y.o. 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     At last visit had emgality, nurtec and zanaflex.  10 HD a month  Had mild-moderate HA now  Pain in occipitalis and trapezius  + photophobia  Zanaflex not helping/stopped taking it  Nurtec does help  Emgality is helping   Chocolate and menses are a trigger   "I really have benefit from TPI, can I please have one?"     From previous visits:wrote for emgality and nurtec.   HA weather related (?) and sleep deprivation "I stay up late with my kids watching television"  Emgality helping   Stopped taking BC  Pain along frontalis  nurtec is hit/miss  8 HD in past month  Severity: 4-8/10  HA last: 12-15 hours     HA HPI:  Start:Since HS, took triptans, and seemed to aide, and in 2021 returned  (frequency), started new BC about a month and noticed frequency intensified   History of trauma (no), History of CNS infection (no), History of Stroke (no)  Location:frontalis and retroorbital and more to the top of the head, occasionally down the cheek (right)  Severity: range: 3-6/10  Duration:8-12 hours in a day   Frequency:12-15 HD a month   Associated factors (bolded positive) WITH HA  (or migraine): Nausea, vomiting, photophobia, phonophobia, tinnitus, scalp pain, vision loss, diplopia, scintillations, eye pain, jaw pain, weakness?    Tried:maxalt, no preventative   Triggers (in bold): stress, lack of sleep, too much caffeine(soda and coffee), too little caffeine, weather change, seasonal change, strong odours, bright lights, sunlight, food  Last HA: yesterday  Positives in bold: Hx of Kidney " Stones, asthma (as a child), GI bleed, osteoporosis, CAD/MI, CVA/TIA, DM    Imaging on file: none   Therapies tried in past: (failures to be marked, if known---why did it fail?)  vyvanse  wellbutrin  maxalt  Prednisone   buspar  zofran  imitrex  depakote  tizandine      Medication Reconciliation:   Current Outpatient Medications   Medication Sig Dispense Refill    ALPRAZolam (XANAX) 0.5 MG tablet Take 0.5 mg by mouth 3 (three) times daily.      buPROPion (WELLBUTRIN) 100 MG tablet       busPIRone (BUSPAR) 10 MG tablet Take 10 mg by mouth 3 (three) times daily.      cevimeline (EVOXAC) 30 mg capsule TAKE ONE CAPSULE BY MOUTH THREE TIMES DAILY 270 capsule 3    CHOLINE ORAL Take by mouth.      ergocalciferol, vitamin D2, (VITAMIN D ORAL) Take by mouth.      galcanezumab-gnlm (EMGALITY PEN) 120 mg/mL PnIj Inject 1 pen (120 mg) into the skin every 28 days. maintenance 1 mL 11    hydrOXYchloroQUINE (PLAQUENIL) 200 mg tablet Take 1 tablet (200 mg total) by mouth 2 (two) times a day. 60 tablet 11    hydrOXYchloroQUINE (PLAQUENIL) 200 mg tablet TAKE ONE TABLET BY MOUTH TWICE DAILY 180 tablet 3    Lactobac no.41/Bifidobact no.7 (PROBIOTIC-10 ORAL) Take by mouth. bromoline      methylphenidate HCl (RITALIN) 10 MG tablet Take 10 mg by mouth once daily.      predniSONE (DELTASONE) 5 MG tablet Take 1-4 tablets (5-20 mg total) by mouth once daily. 120 tablet 11    thymol/chlorophyllin (CHLOROPHYLL ORAL) Take by mouth.      tiZANidine (ZANAFLEX) 2 MG tablet 1-2 pills approx 1-2 hours before bed, as needed for bad headache and neck pain 30 tablet 3    traMADoL (ULTRAM) 50 mg tablet Take 1 tablet (50 mg total) by mouth every 8 (eight) hours as needed. 90 tablet 3    TYROSINE ORAL Take by mouth.      VYVANSE 30 mg capsule Take 30 mg by mouth nightly.       No current facility-administered medications for this visit.     Review of patient's allergies indicates:   Allergen Reactions    Tree nut Anaphylaxis    Tree nuts Anaphylaxis     Biaxin [clarithromycin] Other (See Comments)     GI symptoms     Azithromycin Rash    Ceclor [cefaclor] Rash    Ciprofloxacin Rash    Penicillins Rash    Tetracyclines Hives       PMHx:  Past Medical History:   Diagnosis Date    Allergy     previously on immunotherapy    Anxiety     Depression     Headache     Lumbago with sciatica, right side     Lumbar disc disease     L4-L5    Other chronic pain     Panic disorder     Positive CHELSEA (antinuclear antibody)     Purpura     Sjogren's syndrome     Vasculitis      Past Surgical History:   Procedure Laterality Date    CYST REMOVAL  2012    mandibular    HYSTEROSCOPY N/A 7/17/2020    Procedure: HYSTEROSCOPY/ with IUD removal;  Surgeon: Cherry Christine MD;  Location: [facility];  Service: OB/GYN;  Laterality: N/A;    INTRAUTERINE DEVICE INSERTION  7/17/2020    Procedure: INSERTION, INTRAUTERINE DEVICE;  Surgeon: Cherry Christine MD;  Location: [facility];  Service: OB/GYN;;    lumbar spine procedure      2004/2014    REMOVAL OF INTRAUTERINE DEVICE (IUD)  7/17/2020    Procedure: REMOVAL, INTRAUTERINE DEVICE;  Surgeon: Cherry Christine MD;  Location: [facility];  Service: OB/GYN;;    TONSILLECTOMY AND ADENOIDECTOMY      7th grade       Fhx:  Family History   Problem Relation Age of Onset    Multiple sclerosis Mother     Heart disease Mother     Diabetes Mother     Hypertension Mother     Heart disease Father     Hyperlipidemia Father     Glaucoma Neg Hx     Retinal detachment Neg Hx     Macular degeneration Neg Hx        Shx:   Social History     Socioeconomic History    Marital status:    Tobacco Use    Smoking status: Former    Smokeless tobacco: Never   Substance and Sexual Activity    Alcohol use: Yes     Comment: socially    Drug use: Never           Labs:   Reviewed in chart     Imaging:   Reviewed in chart       Other testing:  Reviewed in chart     Note: I have independently reviewed any/all imaging/labs/tests and agree with the report (s) as documented.  Any  "discrepancies will be as noted/demarcated by free text.  AUGIE MG 9/16/2022                     ROS:   Review Of Systems (questions asked, positive or additions in BOLD)  Gen: Weight change, fatigue/malaise, pyrexia   HEENT: Tinnitus, headache,  blurred vision, eye pain, diplopia, photophobia,  nose bleeds, congestion, sore throat, jaw pain, scalp pain, neck stiffness   Card: Palpitations, CP   Pulm: SOB, cough   Vas: Easy bruising, easy bleeding   GI: N/V/D/C, incontinence, hematemesis, hematochezia    : incontinence, hematuria   Endocrine: Temp intolerance, polyuria, polydipsia   M/S: Neck pain, myalgia, back pain, joint pain, falls    Neuro: PER HPI   PSY: Memory loss, confusion, depression, anxiety, trouble in sleep          EXAM:      /85 (BP Location: Left arm, Patient Position: Sitting, BP Method: Medium (Automatic))   Pulse 104   Resp 17   Ht 5' 6" (1.676 m)   Wt 62.1 kg (137 lb)   BMI 22.11 kg/m²    GEN:  appears uncomfortable, but not in distress  HEENT: NC/AT, Frontalis was NTTP, temporalis was NTTP,  nares patent, dentition appropriate neck supple, trachea midline, trapezius and occipitalis TTP   THORAC: CTAB apices   EXTREM:    no edema present.    NEURO:  Mental Status:  Awake, alert and appropriately oriented to time, place, and person.  Normal attention and concentration.  Speech is fluent and appropriate language function (I.e., comprehension, repetition, etc).     Cranial Nerves:    Extraocular movements are intact and without nystagmus.  Visual fields are full to confrontation testing.   Facial movement is symmetric.  Facial sensation is intact.  Hearing is normal. Uvula in midline. DROM of neck in all (6) directions, shoulder shrug symmetrical. Tongue in midline without fasiculation.     Motor:  RUE:appropriate against gravity                LUE: appropriate against gravity                RLE:appropriate against gravity                LLE: appropriate against gravity "   Tremor/pronator drift not apparent.        Gait and Stance: Normal manner of stance and gait function testing.          This document has been electronically signed by  Eduar MOJICARosalinda Serra MPA, PA-C on 9/16/2022, I have personally typed this message using the EMR.       Dr Daniel MD was available during today's visit.     Personal Protective Equipment:    Personal Protective Equipment was used during this encounter including:surgical mask and non latex gloves.          CC: Nathan Núñez MD        --------------------------in addition to above---------------medically necessary procedures-----------------  Pre Procedure Dx: HA/Migraine  Post Procedure Dx: HA/Migraine   Procedure note:   GONB (Greater Occipital Nerve Block, CPT: 34817): After informed consent was obtained (a copy was given to office staff to scan into the EMR), the patient was asked to remain in a sitting position with her head resting prone on her chest. The area was prepped using alcohol swabs. 0.25% marcaine (3 mL x2 sides of neck=6mL total) a was drawn up utilizing a 25 gauge needle. The occipital trigger points were palpated utilizing latex gloves, a 30 gauge needle and aspiration occured to ensure no medication was introduced into the blood stream during the technique. The medication was delivered bilateral (all of the above was duplicated for the opposite side) in sites 1) midway between the inion and mastoid along the occipital ridge, 2) 2 finger breaths superior lateral to the first injection and 3) 2 finger breaths superior medial to the first injection. Targeted nerves were the greater and lesser occipital nerves (noting 3 distinct points injected per side of the head). This procedure occurred  bilateral.  The patient tolerated the procedure well with no active bleeding, erythema, or discharge. The patient was assessed and allowed to leave after ten minutes.             Pre Procedure Dx (s): HA/Myofacial Pain/Trigger point  tenderness  Post Procedure Dx(s): HA/Myofacial Pain/Trigger point tenderness    Procedure Note:  Trigger point injection: After informed consent was obtained (copy given to office staff to scan into EMR), the patient was asked to remain in a sitting position with her head resting prone on her  chest. The area was prepped using alcohol swabs. 0.25% marcaine (3 mL x2 sides of neck=6mL total)  was drawn up utilizing a 25gauge needle. Three different trigger points along the trapezius were palpated on the RIGHT and 1 cc of the medication was introduced utillizing a 30 gauge needle. Trigger points occurred the approx 4 cm apart, with the last culminating in close proximity to the superior boarder of the scapula at the most superiorlateral region. Targeted areas: trapezius, splenius cervicis/capitis, semispinalis capitis, and cervical paraspinals. The medication was delivered bilateral (all of the above was duplicated for the opposite side). The patient tolerated the procedure well with no active bleeding, erythema, or discharge. The patient was assessed and allowed to leave after ten minutes. Findings from repeat exam (10 mins after procedure) showed:      Gen: NAD  Derm: no drainage   Thorax: CTAB   Neuro: EOMI, tongue in midline, FROM of all extremities, OOC/gait WNL         Attending available- Dr. Sanchez

## 2022-09-27 ENCOUNTER — PATIENT MESSAGE (OUTPATIENT)
Dept: RHEUMATOLOGY | Facility: CLINIC | Age: 42
End: 2022-09-27
Payer: COMMERCIAL

## 2022-10-25 ENCOUNTER — PATIENT MESSAGE (OUTPATIENT)
Dept: RHEUMATOLOGY | Facility: CLINIC | Age: 42
End: 2022-10-25
Payer: COMMERCIAL

## 2022-12-22 ENCOUNTER — OFFICE VISIT (OUTPATIENT)
Dept: FAMILY MEDICINE | Facility: CLINIC | Age: 42
End: 2022-12-22
Payer: COMMERCIAL

## 2022-12-22 DIAGNOSIS — J40 BRONCHITIS: Primary | ICD-10-CM

## 2022-12-22 PROCEDURE — 99213 OFFICE O/P EST LOW 20 MIN: CPT | Mod: 95,,, | Performed by: PHYSICIAN ASSISTANT

## 2022-12-22 PROCEDURE — 99213 PR OFFICE/OUTPT VISIT, EST, LEVL III, 20-29 MIN: ICD-10-PCS | Mod: 95,,, | Performed by: PHYSICIAN ASSISTANT

## 2022-12-22 RX ORDER — PROMETHAZINE HYDROCHLORIDE AND DEXTROMETHORPHAN HYDROBROMIDE 6.25; 15 MG/5ML; MG/5ML
5 SYRUP ORAL 3 TIMES DAILY PRN
Qty: 180 ML | Refills: 0 | Status: SHIPPED | OUTPATIENT
Start: 2022-12-22 | End: 2023-01-01

## 2022-12-22 RX ORDER — ALBUTEROL SULFATE 90 UG/1
2 AEROSOL, METERED RESPIRATORY (INHALATION) EVERY 6 HOURS PRN
Qty: 6.7 G | Refills: 0 | Status: SHIPPED | OUTPATIENT
Start: 2022-12-22 | End: 2023-01-13

## 2022-12-22 RX ORDER — PREDNISONE 10 MG/1
TABLET ORAL
Qty: 18 TABLET | Refills: 0 | Status: SHIPPED | OUTPATIENT
Start: 2022-12-22 | End: 2023-01-11

## 2022-12-22 NOTE — PROGRESS NOTES
Subjective:       Patient ID: Shamika Young is a 42 y.o. female.    Chief Complaint: URI    The patient location is: [place], [place]  The chief complaint leading to consultation is: URI    Visit type: audiovisual    Face to Face time with patient: 20 minutes of total time spent on the encounter, which includes face to face time and non-face to face time preparing to see the patient (eg, review of tests), Obtaining and/or reviewing separately obtained history, Documenting clinical information in the electronic or other health record, Independently interpreting results (not separately reported) and communicating results to the patient/family/caregiver, or Care coordination (not separately reported).         Each patient to whom he or she provides medical services by telemedicine is:  (1) informed of the relationship between the physician and patient and the respective role of any other health care provider with respect to management of the patient; and (2) notified that he or she may decline to receive medical services by telemedicine and may withdraw from such care at any time.    Notes:        URI   This is a recurrent problem. The current episode started 1 to 4 weeks ago. The problem has been gradually worsening. There has been no fever. Associated symptoms include chest pain, coughing, rhinorrhea and wheezing. Pertinent negatives include no ear pain, headaches, rash or sore throat. She has tried antihistamine for the symptoms. The treatment provided no relief.   Cough  This is a new problem. The current episode started yesterday. The problem has been rapidly worsening. The problem occurs hourly. The cough is Non-productive. Associated symptoms include chest pain, nasal congestion, postnasal drip, rhinorrhea, shortness of breath and wheezing. Pertinent negatives include no chills, ear congestion, ear pain, fever, headaches, heartburn, hemoptysis, myalgias, rash, sore throat, sweats or weight loss. The symptoms are  aggravated by cold air. Risk factors for lung disease include travel. She has tried oral steroids and rest for the symptoms. The treatment provided mild relief. Her past medical history is significant for asthma. There is no history of bronchiectasis, bronchitis, COPD, emphysema, environmental allergies or pneumonia.   Review of Systems   Constitutional:  Negative for chills, fever and weight loss.   HENT:  Positive for postnasal drip and rhinorrhea. Negative for ear pain and sore throat.    Respiratory:  Positive for cough, shortness of breath and wheezing. Negative for hemoptysis.    Cardiovascular:  Positive for chest pain.   Gastrointestinal:  Negative for heartburn.   Musculoskeletal:  Negative for myalgias.   Integumentary:  Negative for rash.   Allergic/Immunologic: Negative for environmental allergies.   Neurological:  Negative for headaches.       Objective:      Physical Exam  Constitutional:       General: She is not in acute distress.     Appearance: Normal appearance. She is ill-appearing. She is not toxic-appearing or diaphoretic.   Pulmonary:      Effort: Pulmonary effort is normal.   Neurological:      Mental Status: She is alert.   Psychiatric:         Mood and Affect: Mood normal.       Assessment:       Problem List Items Addressed This Visit    None  Visit Diagnoses       Bronchitis    -  Primary              Plan:       Bronchitis    Other orders  -     predniSONE (DELTASONE) 10 MG tablet; Take 3 daily for 3 days, then 2 daily for three days, then 1 daily for three days.  Dispense: 18 tablet; Refill: 0  -     albuterol (VENTOLIN HFA) 90 mcg/actuation inhaler; Inhale 2 puffs into the lungs every 6 (six) hours as needed for Wheezing. Rescue  Dispense: 6.7 g; Refill: 0  -     promethazine-dextromethorphan (PROMETHAZINE-DM) 6.25-15 mg/5 mL Syrp; Take 5 mLs by mouth 3 (three) times daily as needed (cough).  Dispense: 180 mL; Refill: 0

## 2022-12-22 NOTE — PROGRESS NOTES
Answers submitted by the patient for this visit:  Cough Questionnaire (Submitted on 12/22/2022)  Chief Complaint: Cough  Chronicity: new  Onset: yesterday  Progression since onset: rapidly worsening  Frequency: hourly  Cough characteristics: non-productive  chest pain: Yes  chills: No  ear congestion: No  ear pain: No  fever: No  headaches: No  heartburn: No  hemoptysis: No  myalgias: No  nasal congestion: Yes  postnasal drip: Yes  rash: No  rhinorrhea: Yes  shortness of breath: Yes  sore throat: No  sweats: No  weight loss: No  wheezing: Yes  Aggravated by: cold air  Risk factors for lung disease: travel  asthma: Yes  bronchiectasis: No  bronchitis: No  COPD: No  emphysema: No  environmental allergies: No  pneumonia: No  Treatments tried: oral steroids, rest  Improvement on treatment: mild

## 2023-01-11 ENCOUNTER — OFFICE VISIT (OUTPATIENT)
Dept: FAMILY MEDICINE | Facility: CLINIC | Age: 43
End: 2023-01-11
Payer: COMMERCIAL

## 2023-01-11 VITALS — HEIGHT: 66 IN | BODY MASS INDEX: 22.5 KG/M2 | WEIGHT: 140 LBS

## 2023-01-11 DIAGNOSIS — M54.6 LOWER THORACIC BACK PAIN: Primary | ICD-10-CM

## 2023-01-11 PROCEDURE — 3008F BODY MASS INDEX DOCD: CPT | Mod: CPTII,95,, | Performed by: INTERNAL MEDICINE

## 2023-01-11 PROCEDURE — 99214 OFFICE O/P EST MOD 30 MIN: CPT | Mod: 95,,, | Performed by: INTERNAL MEDICINE

## 2023-01-11 PROCEDURE — 1160F RVW MEDS BY RX/DR IN RCRD: CPT | Mod: CPTII,95,, | Performed by: INTERNAL MEDICINE

## 2023-01-11 PROCEDURE — 99214 PR OFFICE/OUTPT VISIT, EST, LEVL IV, 30-39 MIN: ICD-10-PCS | Mod: 95,,, | Performed by: INTERNAL MEDICINE

## 2023-01-11 PROCEDURE — 1159F MED LIST DOCD IN RCRD: CPT | Mod: CPTII,95,, | Performed by: INTERNAL MEDICINE

## 2023-01-11 PROCEDURE — 3008F PR BODY MASS INDEX (BMI) DOCUMENTED: ICD-10-PCS | Mod: CPTII,95,, | Performed by: INTERNAL MEDICINE

## 2023-01-11 PROCEDURE — 1159F PR MEDICATION LIST DOCUMENTED IN MEDICAL RECORD: ICD-10-PCS | Mod: CPTII,95,, | Performed by: INTERNAL MEDICINE

## 2023-01-11 PROCEDURE — 1160F PR REVIEW ALL MEDS BY PRESCRIBER/CLIN PHARMACIST DOCUMENTED: ICD-10-PCS | Mod: CPTII,95,, | Performed by: INTERNAL MEDICINE

## 2023-01-11 RX ORDER — METAXALONE 400 MG/1
400 TABLET ORAL 3 TIMES DAILY PRN
Qty: 10 TABLET | Refills: 0 | Status: SHIPPED | OUTPATIENT
Start: 2023-01-11 | End: 2023-01-21

## 2023-01-11 NOTE — PROGRESS NOTES
Assessment and Plan:    1. Lower thoracic back pain  Discussed possible causes of back/flank pain bilaterally. Feel that this is most likely muscular per her description. Doubt bilateral pyelonephritis or bilateral kidney stones. Discussed symptoms to watch out for with those conditions and to contact us if she would like to do a urine test. For now will treat as muscular strain.  - metaxalone (SKELAXIN) 400 mg tablet; Take 1 tablet (400 mg total) by mouth 3 (three) times daily as needed (muscle pain).  Dispense: 10 tablet; Refill: 0    ______________________________________________________________________  Subjective:    Chief Complaint:  back/flank pain    HPI:  Shamika is a 42 y.o. year old female patient with telemedicine visit today as consultation for back/flank pain    The patient location is: home in [place]  The chief complaint leading to consultation is: as above    Visit type: audiovisual    Face to Face time with patient: 15 minutes  20 minutes of total time spent on the encounter, which includes face to face time and non-face to face time preparing to see the patient (eg, review of tests), Obtaining and/or reviewing separately obtained history, Documenting clinical information in the electronic or other health record, Independently interpreting results (not separately reported) and communicating results to the patient/family/caregiver, or Care coordination (not separately reported).     Each patient to whom he or she provides medical services by telemedicine is:  (1) informed of the relationship between the physician and patient and the respective role of any other health care provider with respect to management of the patient; and (2) notified that he or she may decline to receive medical services by telemedicine and may withdraw from such care at any time.    Notes:     She has been having bilateral pain in her flank area for several weeks now, mainly in the morning. Initially was short lived, but in the  last few days it started to take several hours to resolve. She has been told that she has hypermobilty in her sacrum. She is a former ballet dancer. The pain in the last few days was bad enough to stop her from doing her usual activities. She was concerned about possible kidney stones after reading online about flank pain, so she has been drinking more water. Pain was actually less severe than before today.     She reports that in the past, muscle relaxers like cyclobenzaprine and tizanidine have made her overly sedated. Had done better with skelaxin.    Medications:  Current Outpatient Medications on File Prior to Visit   Medication Sig Dispense Refill    albuterol (VENTOLIN HFA) 90 mcg/actuation inhaler Inhale 2 puffs into the lungs every 6 (six) hours as needed for Wheezing. Rescue 6.7 g 0    ALPRAZolam (XANAX) 0.5 MG tablet Take 0.5 mg by mouth 3 (three) times daily.      buPROPion (WELLBUTRIN) 100 MG tablet       busPIRone (BUSPAR) 10 MG tablet Take 10 mg by mouth 3 (three) times daily.      cevimeline (EVOXAC) 30 mg capsule TAKE ONE CAPSULE BY MOUTH THREE TIMES DAILY 270 capsule 3    CHOLINE ORAL Take by mouth.      ergocalciferol, vitamin D2, (VITAMIN D ORAL) Take by mouth.      galcanezumab-gnlm (EMGALITY PEN) 120 mg/mL PnIj Inject 1 pen (120 mg) into the skin every 28 days. maintenance 1 mL 11    hydrOXYchloroQUINE (PLAQUENIL) 200 mg tablet Take 1 tablet (200 mg total) by mouth 2 (two) times a day. 60 tablet 11    Lactobac no.41/Bifidobact no.7 (PROBIOTIC-10 ORAL) Take by mouth. bromoline      methylphenidate HCl (RITALIN) 10 MG tablet Take 10 mg by mouth once daily.      predniSONE (DELTASONE) 5 MG tablet Take 1-4 tablets (5-20 mg total) by mouth once daily. 120 tablet 11    thymol/chlorophyllin (CHLOROPHYLL ORAL) Take by mouth.      TYROSINE ORAL Take by mouth.      VYVANSE 30 mg capsule Take 30 mg by mouth nightly.      [DISCONTINUED] predniSONE (DELTASONE) 10 MG tablet Take 3 daily for 3 days, then 2  "daily for three days, then 1 daily for three days. 18 tablet 0     No current facility-administered medications on file prior to visit.       Review of Systems:  Review of Systems   Genitourinary:  Positive for flank pain. Negative for dysuria, frequency, hematuria and pelvic pain.   Musculoskeletal:  Positive for back pain.   Neurological:  Negative for weakness and numbness.     Past Medical History:  Past Medical History:   Diagnosis Date    Allergy     previously on immunotherapy    Anxiety     Depression     Headache     Lumbago with sciatica, right side     Lumbar disc disease     L4-L5    Other chronic pain     Panic disorder     Positive CHELSEA (antinuclear antibody)     Purpura     Sjogren's syndrome     Vasculitis        Objective:    Vitals:  Vitals:    01/11/23 1612   Weight: 63.5 kg (140 lb)   Height: 5' 6" (1.676 m)       Physical Exam  Constitutional:       General: She is not in acute distress.     Appearance: She is well-developed.   HENT:      Head: Normocephalic and atraumatic.   Pulmonary:      Effort: Pulmonary effort is normal. No respiratory distress.   Neurological:      Mental Status: She is alert and oriented to person, place, and time.   Psychiatric:         Behavior: Behavior normal.         Thought Content: Thought content normal.         Judgment: Judgment normal.       Julia Sargent MD  Internal Medicine    "

## 2023-01-17 ENCOUNTER — PATIENT MESSAGE (OUTPATIENT)
Dept: ADMINISTRATIVE | Facility: HOSPITAL | Age: 43
End: 2023-01-17
Payer: COMMERCIAL

## 2023-01-18 DIAGNOSIS — Z12.31 OTHER SCREENING MAMMOGRAM: ICD-10-CM

## 2023-02-03 ENCOUNTER — TELEPHONE (OUTPATIENT)
Dept: RHEUMATOLOGY | Facility: CLINIC | Age: 43
End: 2023-02-03
Payer: COMMERCIAL

## 2023-02-03 RX ORDER — SODIUM CHLORIDE 0.9 % (FLUSH) 0.9 %
10 SYRINGE (ML) INJECTION
Status: CANCELLED | OUTPATIENT
Start: 2023-02-03

## 2023-02-03 RX ORDER — HEPARIN 100 UNIT/ML
500 SYRINGE INTRAVENOUS
Status: CANCELLED | OUTPATIENT
Start: 2023-02-03

## 2023-02-03 RX ORDER — ACETAMINOPHEN 325 MG/1
650 TABLET ORAL
Status: CANCELLED | OUTPATIENT
Start: 2023-02-03

## 2023-02-03 RX ORDER — DIPHENHYDRAMINE HCL 25 MG
25 CAPSULE ORAL
Status: CANCELLED | OUTPATIENT
Start: 2023-02-03

## 2023-02-03 RX ORDER — METHYLPREDNISOLONE SOD SUCC 125 MG
100 VIAL (EA) INJECTION
Status: CANCELLED | OUTPATIENT
Start: 2023-02-03

## 2023-02-03 RX ORDER — MEPERIDINE HYDROCHLORIDE 50 MG/ML
25 INJECTION INTRAMUSCULAR; INTRAVENOUS; SUBCUTANEOUS
Status: CANCELLED | OUTPATIENT
Start: 2023-02-03

## 2023-02-06 ENCOUNTER — TELEPHONE (OUTPATIENT)
Dept: RHEUMATOLOGY | Facility: CLINIC | Age: 43
End: 2023-02-06
Payer: COMMERCIAL

## 2023-02-06 DIAGNOSIS — D89.1 CRYOGLOBULINEMIC VASCULITIS: Primary | ICD-10-CM

## 2023-02-06 RX ORDER — SODIUM CHLORIDE 0.9 % (FLUSH) 0.9 %
10 SYRINGE (ML) INJECTION
Status: CANCELLED | OUTPATIENT
Start: 2023-02-06

## 2023-02-06 RX ORDER — FAMOTIDINE 10 MG/ML
20 INJECTION INTRAVENOUS
Status: CANCELLED | OUTPATIENT
Start: 2023-02-06

## 2023-02-06 RX ORDER — HEPARIN 100 UNIT/ML
500 SYRINGE INTRAVENOUS
Status: CANCELLED | OUTPATIENT
Start: 2023-02-06

## 2023-02-06 RX ORDER — ACETAMINOPHEN 325 MG/1
650 TABLET ORAL
Status: CANCELLED | OUTPATIENT
Start: 2023-02-06

## 2023-02-06 NOTE — PROGRESS NOTES
Dr. Buchanan's nurse asked for assistance with entering [employer]. Entered [employer] and removed Rituan. Routing to Dr. Buchanan for review and signature    Of note, pt due for Hep B, Hep C, and TB labs

## 2023-02-06 NOTE — TELEPHONE ENCOUNTER
Ok for change to Rituxan biosimilar - [employer].   I tried to find RA protocol in our chem orders for Rituxan biosimilar we do not have one.     Please have pharmacy add.

## 2023-02-07 ENCOUNTER — TELEPHONE (OUTPATIENT)
Dept: RHEUMATOLOGY | Facility: CLINIC | Age: 43
End: 2023-02-07

## 2023-02-07 ENCOUNTER — PATIENT MESSAGE (OUTPATIENT)
Dept: RHEUMATOLOGY | Facility: CLINIC | Age: 43
End: 2023-02-07
Payer: COMMERCIAL

## 2023-02-07 ENCOUNTER — PATIENT OUTREACH (OUTPATIENT)
Dept: RHEUMATOLOGY | Facility: CLINIC | Age: 43
End: 2023-02-07
Payer: COMMERCIAL

## 2023-02-07 DIAGNOSIS — Z79.899 HIGH RISK MEDICATIONS (NOT ANTICOAGULANTS) LONG-TERM USE: ICD-10-CM

## 2023-02-07 DIAGNOSIS — I73.00 RAYNAUD'S PHENOMENON WITHOUT GANGRENE: ICD-10-CM

## 2023-02-07 DIAGNOSIS — D89.1 CRYOGLOBULINEMIC VASCULITIS: Primary | ICD-10-CM

## 2023-02-07 DIAGNOSIS — L50.9 URTICARIA: ICD-10-CM

## 2023-02-07 DIAGNOSIS — M35.00 SJOGREN'S SYNDROME WITHOUT EXTRAGLANDULAR INVOLVEMENT: ICD-10-CM

## 2023-02-07 NOTE — PROGRESS NOTES
I have not seen Healther is almost 1 year she may not get her Ruxience filled due to missing labs that we set up at periodic office visits. We cannot guarantee her infusion tomorrow if we don't already have the auth as she is past the Hep requirement.      I have added her needed labs of CBC, CMP, sed, and CRP as well as cryoglobulins hopefully we can get this approved. OK to receive but just notify patient why this is happening- all this work is done in our routine office visit.      Last Ruxience was given 8/1/22 and 7/18/22 there was a delay due to authorization and +COVID same months.      She has been on Ruxience since 2022 and done well.  Patient need f/u appt with Dewayne      Do NOT schedule further Ruxience as I will decide when the next dose will be    Patient has gone through a pre authorization process with myself we have checked her labs with cleared her for Rituxan in this case her insurance will require Rituxan which is what she received last time I have ordered labs for monitoring requirements.  She is fully aware of the risks benefits side effects of rituximab therapy this is not her 1st infusion.  All of this coordination including my chart review and work has been 45 minutes of time

## 2023-02-08 ENCOUNTER — PATIENT MESSAGE (OUTPATIENT)
Dept: RHEUMATOLOGY | Facility: CLINIC | Age: 43
End: 2023-02-08
Payer: COMMERCIAL

## 2023-02-08 ENCOUNTER — OFFICE VISIT (OUTPATIENT)
Dept: RHEUMATOLOGY | Facility: CLINIC | Age: 43
End: 2023-02-08
Payer: COMMERCIAL

## 2023-02-08 ENCOUNTER — PATIENT MESSAGE (OUTPATIENT)
Dept: RHEUMATOLOGY | Facility: CLINIC | Age: 43
End: 2023-02-08

## 2023-02-08 ENCOUNTER — LAB VISIT (OUTPATIENT)
Dept: LAB | Facility: HOSPITAL | Age: 43
End: 2023-02-08
Attending: INTERNAL MEDICINE
Payer: COMMERCIAL

## 2023-02-08 VITALS
HEIGHT: 66 IN | BODY MASS INDEX: 21.86 KG/M2 | SYSTOLIC BLOOD PRESSURE: 122 MMHG | DIASTOLIC BLOOD PRESSURE: 78 MMHG | WEIGHT: 136 LBS | HEART RATE: 106 BPM

## 2023-02-08 DIAGNOSIS — L50.9 URTICARIA: ICD-10-CM

## 2023-02-08 DIAGNOSIS — D89.0 POLYCLONAL GAMMOPATHY: ICD-10-CM

## 2023-02-08 DIAGNOSIS — D89.1 CRYOGLOBULINEMIC VASCULITIS: ICD-10-CM

## 2023-02-08 DIAGNOSIS — I73.00 RAYNAUD'S PHENOMENON WITHOUT GANGRENE: ICD-10-CM

## 2023-02-08 DIAGNOSIS — D89.1 CRYOGLOBULINEMIC VASCULITIS: Primary | ICD-10-CM

## 2023-02-08 DIAGNOSIS — M35.00 SJOGREN'S SYNDROME WITHOUT EXTRAGLANDULAR INVOLVEMENT: ICD-10-CM

## 2023-02-08 DIAGNOSIS — Z79.899 HIGH RISK MEDICATIONS (NOT ANTICOAGULANTS) LONG-TERM USE: ICD-10-CM

## 2023-02-08 LAB
ALBUMIN SERPL BCP-MCNC: 4.3 G/DL (ref 3.5–5.2)
ALP SERPL-CCNC: 60 U/L (ref 55–135)
ALT SERPL W/O P-5'-P-CCNC: 26 U/L (ref 10–44)
ANION GAP SERPL CALC-SCNC: 8 MMOL/L (ref 8–16)
AST SERPL-CCNC: 19 U/L (ref 10–40)
BASOPHILS # BLD AUTO: 0.03 K/UL (ref 0–0.2)
BASOPHILS NFR BLD: 0.7 % (ref 0–1.9)
BILIRUB SERPL-MCNC: 0.5 MG/DL (ref 0.1–1)
BUN SERPL-MCNC: 13 MG/DL (ref 6–20)
CALCIUM SERPL-MCNC: 9.8 MG/DL (ref 8.7–10.5)
CHLORIDE SERPL-SCNC: 104 MMOL/L (ref 95–110)
CO2 SERPL-SCNC: 25 MMOL/L (ref 23–29)
CREAT SERPL-MCNC: 0.9 MG/DL (ref 0.5–1.4)
CRP SERPL-MCNC: 2.2 MG/L (ref 0–8.2)
DIFFERENTIAL METHOD: ABNORMAL
EOSINOPHIL # BLD AUTO: 0.1 K/UL (ref 0–0.5)
EOSINOPHIL NFR BLD: 1.6 % (ref 0–8)
ERYTHROCYTE [DISTWIDTH] IN BLOOD BY AUTOMATED COUNT: 13.5 % (ref 11.5–14.5)
ERYTHROCYTE [SEDIMENTATION RATE] IN BLOOD BY PHOTOMETRIC METHOD: 37 MM/HR (ref 0–36)
EST. GFR  (NO RACE VARIABLE): >60 ML/MIN/1.73 M^2
GLUCOSE SERPL-MCNC: 94 MG/DL (ref 70–110)
HBV CORE AB SERPL QL IA: NORMAL
HBV SURFACE AB SER-ACNC: <3 MIU/ML
HBV SURFACE AB SER-ACNC: NORMAL M[IU]/ML
HBV SURFACE AG SERPL QL IA: NORMAL
HCT VFR BLD AUTO: 40.8 % (ref 37–48.5)
HCV AB SERPL QL IA: NORMAL
HGB BLD-MCNC: 13.1 G/DL (ref 12–16)
IMM GRANULOCYTES # BLD AUTO: 0.01 K/UL (ref 0–0.04)
IMM GRANULOCYTES NFR BLD AUTO: 0.2 % (ref 0–0.5)
LYMPHOCYTES # BLD AUTO: 0.9 K/UL (ref 1–4.8)
LYMPHOCYTES NFR BLD: 21 % (ref 18–48)
MCH RBC QN AUTO: 30.8 PG (ref 27–31)
MCHC RBC AUTO-ENTMCNC: 32.1 G/DL (ref 32–36)
MCV RBC AUTO: 96 FL (ref 82–98)
MONOCYTES # BLD AUTO: 0.6 K/UL (ref 0.3–1)
MONOCYTES NFR BLD: 13.7 % (ref 4–15)
NEUTROPHILS # BLD AUTO: 2.8 K/UL (ref 1.8–7.7)
NEUTROPHILS NFR BLD: 62.8 % (ref 38–73)
NRBC BLD-RTO: 0 /100 WBC
PLATELET # BLD AUTO: 231 K/UL (ref 150–450)
PMV BLD AUTO: 11.9 FL (ref 9.2–12.9)
POTASSIUM SERPL-SCNC: 3.8 MMOL/L (ref 3.5–5.1)
PROT SERPL-MCNC: 8 G/DL (ref 6–8.4)
RBC # BLD AUTO: 4.26 M/UL (ref 4–5.4)
SODIUM SERPL-SCNC: 137 MMOL/L (ref 136–145)
WBC # BLD AUTO: 4.38 K/UL (ref 3.9–12.7)

## 2023-02-08 PROCEDURE — 80053 COMPREHEN METABOLIC PANEL: CPT | Performed by: INTERNAL MEDICINE

## 2023-02-08 PROCEDURE — 99999 PR PBB SHADOW E&M-EST. PATIENT-LVL IV: CPT | Mod: PBBFAC,,, | Performed by: INTERNAL MEDICINE

## 2023-02-08 PROCEDURE — 86039 ANTINUCLEAR ANTIBODIES (ANA): CPT | Performed by: INTERNAL MEDICINE

## 2023-02-08 PROCEDURE — 86235 NUCLEAR ANTIGEN ANTIBODY: CPT | Mod: 59 | Performed by: INTERNAL MEDICINE

## 2023-02-08 PROCEDURE — 84165 PROTEIN E-PHORESIS SERUM: CPT | Performed by: INTERNAL MEDICINE

## 2023-02-08 PROCEDURE — 86225 DNA ANTIBODY NATIVE: CPT | Performed by: INTERNAL MEDICINE

## 2023-02-08 PROCEDURE — 3078F DIAST BP <80 MM HG: CPT | Mod: CPTII,S$GLB,, | Performed by: INTERNAL MEDICINE

## 2023-02-08 PROCEDURE — 87340 HEPATITIS B SURFACE AG IA: CPT | Performed by: INTERNAL MEDICINE

## 2023-02-08 PROCEDURE — 99214 OFFICE O/P EST MOD 30 MIN: CPT | Mod: S$GLB,,, | Performed by: INTERNAL MEDICINE

## 2023-02-08 PROCEDURE — 3074F PR MOST RECENT SYSTOLIC BLOOD PRESSURE < 130 MM HG: ICD-10-PCS | Mod: CPTII,S$GLB,, | Performed by: INTERNAL MEDICINE

## 2023-02-08 PROCEDURE — 85652 RBC SED RATE AUTOMATED: CPT | Performed by: INTERNAL MEDICINE

## 2023-02-08 PROCEDURE — 86235 NUCLEAR ANTIGEN ANTIBODY: CPT | Performed by: INTERNAL MEDICINE

## 2023-02-08 PROCEDURE — 1160F RVW MEDS BY RX/DR IN RCRD: CPT | Mod: CPTII,S$GLB,, | Performed by: INTERNAL MEDICINE

## 2023-02-08 PROCEDURE — 86140 C-REACTIVE PROTEIN: CPT | Performed by: INTERNAL MEDICINE

## 2023-02-08 PROCEDURE — 3008F BODY MASS INDEX DOCD: CPT | Mod: CPTII,S$GLB,, | Performed by: INTERNAL MEDICINE

## 2023-02-08 PROCEDURE — 86803 HEPATITIS C AB TEST: CPT | Performed by: INTERNAL MEDICINE

## 2023-02-08 PROCEDURE — 1160F PR REVIEW ALL MEDS BY PRESCRIBER/CLIN PHARMACIST DOCUMENTED: ICD-10-PCS | Mod: CPTII,S$GLB,, | Performed by: INTERNAL MEDICINE

## 2023-02-08 PROCEDURE — 36415 COLL VENOUS BLD VENIPUNCTURE: CPT | Mod: PO | Performed by: INTERNAL MEDICINE

## 2023-02-08 PROCEDURE — 99999 PR PBB SHADOW E&M-EST. PATIENT-LVL IV: ICD-10-PCS | Mod: PBBFAC,,, | Performed by: INTERNAL MEDICINE

## 2023-02-08 PROCEDURE — 3008F PR BODY MASS INDEX (BMI) DOCUMENTED: ICD-10-PCS | Mod: CPTII,S$GLB,, | Performed by: INTERNAL MEDICINE

## 2023-02-08 PROCEDURE — 3078F PR MOST RECENT DIASTOLIC BLOOD PRESSURE < 80 MM HG: ICD-10-PCS | Mod: CPTII,S$GLB,, | Performed by: INTERNAL MEDICINE

## 2023-02-08 PROCEDURE — 84165 PATHOLOGIST INTERPRETATION SPE: ICD-10-PCS | Mod: 26,,, | Performed by: PATHOLOGY

## 2023-02-08 PROCEDURE — 1159F MED LIST DOCD IN RCRD: CPT | Mod: CPTII,S$GLB,, | Performed by: INTERNAL MEDICINE

## 2023-02-08 PROCEDURE — 86334 IMMUNOFIX E-PHORESIS SERUM: CPT | Performed by: INTERNAL MEDICINE

## 2023-02-08 PROCEDURE — 85025 COMPLETE CBC W/AUTO DIFF WBC: CPT | Performed by: INTERNAL MEDICINE

## 2023-02-08 PROCEDURE — 3074F SYST BP LT 130 MM HG: CPT | Mod: CPTII,S$GLB,, | Performed by: INTERNAL MEDICINE

## 2023-02-08 PROCEDURE — 86038 ANTINUCLEAR ANTIBODIES: CPT | Performed by: INTERNAL MEDICINE

## 2023-02-08 PROCEDURE — 86334 PATHOLOGIST INTERPRETATION IFE: ICD-10-PCS | Mod: 26,,, | Performed by: PATHOLOGY

## 2023-02-08 PROCEDURE — 1159F PR MEDICATION LIST DOCUMENTED IN MEDICAL RECORD: ICD-10-PCS | Mod: CPTII,S$GLB,, | Performed by: INTERNAL MEDICINE

## 2023-02-08 PROCEDURE — 82595 ASSAY OF CRYOGLOBULIN: CPT | Performed by: INTERNAL MEDICINE

## 2023-02-08 PROCEDURE — 86334 IMMUNOFIX E-PHORESIS SERUM: CPT | Mod: 26,,, | Performed by: PATHOLOGY

## 2023-02-08 PROCEDURE — 86704 HEP B CORE ANTIBODY TOTAL: CPT | Performed by: INTERNAL MEDICINE

## 2023-02-08 PROCEDURE — 86706 HEP B SURFACE ANTIBODY: CPT | Mod: 91 | Performed by: INTERNAL MEDICINE

## 2023-02-08 PROCEDURE — 84165 PROTEIN E-PHORESIS SERUM: CPT | Mod: 26,,, | Performed by: PATHOLOGY

## 2023-02-08 PROCEDURE — 99214 PR OFFICE/OUTPT VISIT, EST, LEVL IV, 30-39 MIN: ICD-10-PCS | Mod: S$GLB,,, | Performed by: INTERNAL MEDICINE

## 2023-02-08 RX ORDER — PREDNISONE 5 MG/1
5-20 TABLET ORAL DAILY
Qty: 120 TABLET | Refills: 11 | Status: SHIPPED | OUTPATIENT
Start: 2023-02-08 | End: 2023-04-21

## 2023-02-08 RX ORDER — RIMEGEPANT SULFATE 75 MG/75MG
75 TABLET, ORALLY DISINTEGRATING ORAL ONCE AS NEEDED
COMMUNITY
Start: 2022-09-30 | End: 2023-12-18

## 2023-02-08 RX ORDER — CEVIMELINE HYDROCHLORIDE 30 MG/1
30 CAPSULE ORAL 3 TIMES DAILY
Qty: 270 CAPSULE | Refills: 3 | Status: SHIPPED | OUTPATIENT
Start: 2023-02-08 | End: 2023-06-26

## 2023-02-08 RX ORDER — LORAZEPAM 1 MG/1
1 TABLET ORAL DAILY PRN
COMMUNITY
Start: 2023-01-24

## 2023-02-08 RX ORDER — HYDROXYCHLOROQUINE SULFATE 200 MG/1
200 TABLET, FILM COATED ORAL 2 TIMES DAILY
Qty: 60 TABLET | Refills: 11 | Status: SHIPPED | OUTPATIENT
Start: 2023-02-08 | End: 2024-01-09 | Stop reason: SDUPTHER

## 2023-02-08 ASSESSMENT — ROUTINE ASSESSMENT OF PATIENT INDEX DATA (RAPID3)
PSYCHOLOGICAL DISTRESS SCORE: 1.1
PAIN SCORE: 6
MDHAQ FUNCTION SCORE: 0.6
TOTAL RAPID3 SCORE: 4.83
PATIENT GLOBAL ASSESSMENT SCORE: 6.5
FATIGUE SCORE: 1.1

## 2023-02-08 NOTE — PROGRESS NOTES
Subjective:          Chief Complaint: Shamika Young is a 42 y.o. female who had concerns including Disease Management.    HPI:  Interval events:  Primary Sjogren's cryoglobulinemic vasculitis.    Patient was using [facility] unfortunately they will give a quantitative value but they were positive cryoglobulins as of 10/26/2021.  She denies fevers, chills, night sweats.  She notes some rashes with dusky changes at the knee exacerbates with stress.     Patient has been doing well overall since last RTX (Ruxience) 8/2022. Patient started in mid Jan with urticarial rashes, livedo and petechia in bilateral LE.   Pins in needles in forearm but this appears to be positional. Trying to adjust sleep and desk  + Fatigue as well.   Raynauds always worse in winter.   No SOB/LÓPEZ no hemoptysis.     Continues on    Rituxan periodically based on Cryos, symptoms and lab markers.   HCQ 200mg BID- last eye 2021. Dr. Glover no maculopathy will need to call.   Prednisone 5mg- only uses in pulse tapers (20-30mg few days)    Rheum Hx:   Patient is a 39-year-old female being referred by Dr. Chopra with hx of cryoglobulinemia vasculitis and Primary Sjogren's.    Patient started Adaptogenic supplement greatly helpful, no needing prednisone in weeks. Wants to see if FSA would cover this.     Patient seen at [facility]. Ultimately diagnosed [id number].  She states rash started during pregnancy of legs started with purpura of legs. She then developed more diffuse coalescing purpura but no open sores. No renal or respiratory disease to date.   Patient did have skin bx: LCV  Previously hypocomplementemic.   She did note fewer skin purpuric lesions after 2 cycles of RTX   She has noted more urticaria with wheal in the skin,  some reactions with food, wheat/rice seem to be worst. She notes no new soaps or detergents. Present since 2017.   She has seen allergist with some nut and environmental allergies no changes present since childhood.  - working with DR. Curtis.   Dry eyes present but tolerates contacts.   Dry mouth more problematic.   Joints fluctuate between 6-9/10. Hand and wrist predominant.   No real Raynauds in triphasic pattern but cold hands and feet.   Patient c/o swelling of lips at times-no known food allergies.       She is noting more livedo and mottling in legs w/o activity, ++ fatigue increasing. She does note during flares some joint pains. 2-5 days every 2 weeks. Flares seem to be ocurring every 2 weeks.   She notes working from home has helped with overall fatigue/pain.   No numbness or tingling.     PFTs at [place] were reportedly normal 7/2019 , no hemoptysis or SOB  Thinks she had CT chest.   No bone marrow bx.   No DVT, miscarriages, seizures or strokes.   No renal disease.        She is on hydroxychloroquine 200 mg twice daily.  Rituxan last dose 7/2019  cevimiline 30mg TID  Prednisone 10mg once daily PRN ( during flare daily rest is periodical.   Rituximab last dose 7/2019 (x 3 treatments 6 months apart. )     OTC:   Xylimelts.   Not using biotene.   sugarfree lemon  gylcerin lozenges.     Previous medications: Salagen with ASE with HA.   No hx of hepatitis, no malignancy in self to date.   Component      Latest Ref Rng & Units 3/3/2020   Anti-SSA Antibody      0.00 - 0.99 Ratio 4.67 (H)   Anti-SSA Interpretation      Negative Positive (A)   Anti-SSB Antibody      0.00 - 0.99 Ratio 2.40 (H)   Anti-SSB Interpretation      Negative Positive (A)   Anti Sm Antibody      0.00 - 0.99 Ratio 0.07   Anti-Sm Interpretation      Negative Negative   Anti Sm/RNP Antibody      0.00 - 0.99 Ratio 0.17   Anti-Sm/RNP Interpretation      Negative Negative   Rheumatoid Factor      0.0 - 15.0 IU/mL 484.0 (H)   CCP Antibodies      <5.0 U/mL <0.5   CHELSEA Screen      Negative <1:80 Positive (A)   ds DNA Ab      Negative 1:10 Negative 1:10   CHELSEA HEP-2 Titer       Positive >=1:2560 Speckled         REVIEW OF SYSTEMS:    Review of Systems    Constitutional:  Positive for malaise/fatigue. Negative for fever and weight loss.   HENT:  Negative for sore throat.    Eyes:  Negative for double vision, photophobia and redness.   Respiratory:  Negative for cough, shortness of breath and wheezing.    Cardiovascular:  Negative for chest pain, palpitations and orthopnea.   Gastrointestinal:  Negative for abdominal pain, constipation and diarrhea.   Genitourinary:  Negative for dysuria, hematuria and urgency.   Musculoskeletal:  Positive for joint pain and myalgias. Negative for back pain.   Skin:  Positive for rash (livedo).   Neurological:  Negative for dizziness, tingling, focal weakness and headaches.   Endo/Heme/Allergies:  Does not bruise/bleed easily.   Psychiatric/Behavioral:  Negative for depression, hallucinations and suicidal ideas. The patient is nervous/anxious.              Objective:            Past Medical History:   Diagnosis Date    Allergy     previously on immunotherapy    Anxiety     Depression     Headache     Lumbago with sciatica, right side     Lumbar disc disease     L4-L5    Other chronic pain     Panic disorder     Positive CHELSEA (antinuclear antibody)     Purpura     Sjogren's syndrome     Vasculitis      Family History   Problem Relation Age of Onset    Multiple sclerosis Mother     Heart disease Mother     Diabetes Mother     Hypertension Mother     Heart disease Father     Hyperlipidemia Father     Glaucoma Neg Hx     Retinal detachment Neg Hx     Macular degeneration Neg Hx      Social History     Tobacco Use    Smoking status: Former    Smokeless tobacco: Never   Substance Use Topics    Alcohol use: Yes     Comment: socially    Drug use: Never         Current Outpatient Medications on File Prior to Visit   Medication Sig Dispense Refill    albuterol (PROVENTIL/VENTOLIN HFA) 90 mcg/actuation inhaler INHALE 2 PUFFS INTO LUNGS EVERY 6 HOURS AS NEEDED WHEEZING 18 g 11    buPROPion (WELLBUTRIN) 100 MG tablet       busPIRone (BUSPAR) 10  MG tablet Take 10 mg by mouth 3 (three) times daily.      cevimeline (EVOXAC) 30 mg capsule TAKE ONE CAPSULE BY MOUTH THREE TIMES DAILY 270 capsule 3    CHOLINE ORAL Take by mouth.      ergocalciferol, vitamin D2, (VITAMIN D ORAL) Take by mouth.      galcanezumab-gnlm (EMGALITY PEN) 120 mg/mL PnIj Inject 1 pen (120 mg) into the skin every 28 days. maintenance 1 mL 11    hydrOXYchloroQUINE (PLAQUENIL) 200 mg tablet Take 1 tablet (200 mg total) by mouth 2 (two) times a day. 60 tablet 11    Lactobac no.41/Bifidobact no.7 (PROBIOTIC-10 ORAL) Take by mouth. bromoline      LORazepam (ATIVAN) 1 MG tablet Take 1 mg by mouth daily as needed.      methylphenidate HCl (RITALIN) 10 MG tablet Take 10 mg by mouth once daily.      NURTEC 75 mg odt       predniSONE (DELTASONE) 5 MG tablet Take 1-4 tablets (5-20 mg total) by mouth once daily. 120 tablet 11    thymol/chlorophyllin (CHLOROPHYLL ORAL) Take by mouth.      TYROSINE ORAL Take by mouth.      VYVANSE 30 mg capsule Take 30 mg by mouth nightly.      ALPRAZolam (XANAX) 0.5 MG tablet Take 0.5 mg by mouth 3 (three) times daily.       No current facility-administered medications on file prior to visit.       Vitals:    02/08/23 1042   BP: 122/78   Pulse: 106       Physical Exam:    Physical Exam  Constitutional:       Appearance: She is well-developed.   Eyes:      General: Lids are normal.   Skin:     Comments: Legs with some mottling and livedo.    Neurological:      Mental Status: She is oriented to person, place, and time.   Psychiatric:         Behavior: Behavior normal.         Thought Content: Thought content normal.             Assessment:       Encounter Diagnoses   Name Primary?    Cryoglobulinemic vasculitis Yes    Sjogren's syndrome without extraglandular involvement     Polyclonal gammopathy           Plan:        Cryoglobulinemic vasculitis  -     CHELSEA Screen w/Reflex; Future; Expected date: 02/08/2023  -     Sjogrens syndrome-A extractable nuclear antibody; Future;  Expected date: 02/08/2023  -     Sjogrens syndrome-B extractable nuclear antibody; Future; Expected date: 02/08/2023  -     ANTI-NEUTROPHILIC CYTOPLASMIC ANTIBODY; Future; Expected date: 02/08/2023  -     PROTEIN ELECTROPHORESIS, SERUM; Future; Expected date: 02/08/2023  -     Immunofixation Electrophoresis; Future; Expected date: 02/08/2023  -     cevimeline (EVOXAC) 30 mg capsule; Take 1 capsule (30 mg total) by mouth 3 (three) times daily.  Dispense: 270 capsule; Refill: 3    Sjogren's syndrome without extraglandular involvement  -     CHELSEA Screen w/Reflex; Future; Expected date: 02/08/2023  -     Sjogrens syndrome-A extractable nuclear antibody; Future; Expected date: 02/08/2023  -     Sjogrens syndrome-B extractable nuclear antibody; Future; Expected date: 02/08/2023  -     ANTI-NEUTROPHILIC CYTOPLASMIC ANTIBODY; Future; Expected date: 02/08/2023  -     PROTEIN ELECTROPHORESIS, SERUM; Future; Expected date: 02/08/2023  -     Immunofixation Electrophoresis; Future; Expected date: 02/08/2023  -     cevimeline (EVOXAC) 30 mg capsule; Take 1 capsule (30 mg total) by mouth 3 (three) times daily.  Dispense: 270 capsule; Refill: 3    Polyclonal gammopathy  -     PROTEIN ELECTROPHORESIS, SERUM; Future; Expected date: 02/08/2023  -     Immunofixation Electrophoresis; Future; Expected date: 02/08/2023    Raynaud's phenomenon without gangrene  -     cevimeline (EVOXAC) 30 mg capsule; Take 1 capsule (30 mg total) by mouth 3 (three) times daily.  Dispense: 270 capsule; Refill: 3    Urticaria  -     cevimeline (EVOXAC) 30 mg capsule; Take 1 capsule (30 mg total) by mouth 3 (three) times daily.  Dispense: 270 capsule; Refill: 3    Other orders  -     predniSONE (DELTASONE) 5 MG tablet; Take 1-4 tablets (5-20 mg total) by mouth once daily.  Dispense: 120 tablet; Refill: 11  -     hydrOXYchloroQUINE (PLAQUENIL) 200 mg tablet; Take 1 tablet (200 mg total) by mouth 2 (two) times a day.  Dispense: 60 tablet; Refill: 11          Patient with Cryoglobulinemic vasculitis treated last 7/20222022/81/22 per RA protocol          Do not send to ST for labs anymore.    Tramadol PRN    Prednisone 5mg daily, may increase to 10 PRN flares.    Hep and T. Spot neg 2020  Holding on RTX to complete todays visit and labs then can schedule.   Adaptogenic supplement seems to be allowing her to reduce prednisone, flares of vasculitis and I    Primary Sjogrens:   HCQ 200mg BID   Evoxac 30mg TID    Likely FMS: robaxin        Follow up in about 5 months (around 7/8/2023).      30min consultation with greater than 50% spent in counseling, chart review and coordination of care. All questions answered.  Thank you for allowing me to participate in the care of this very pleasant patient.        Received records-     PFT 7/2019:   CT chest w/o contras 8/2019: no fibrosis or changes to suggest ILD or lymphoid interstitial pneumonia. Calcified granuloma LLL. nonenlarged mediatstinal and hilar lymph nodes may be reactive.     UA 2019: no proteinuria/ no RBCs  Quant IgG: IgG slightly elevated, remaining ENL  SPEP 2019: polyclonal hypergammaglobulinemia, cannot r/o small monoclonal protein  Cryos 7/2019: 3  Cryofibrinogen 7/2019: negative  C3 normal  C4 3 and low.   High titer RF, SSA and SSB.   Normal ESR and CRP    Cryos: 3/2019 trace , r  Immunofixatio: 1/30/19: type II cryos monoclonal IgM kappa plus polyclonal IgG.     Baseline eye exam: dates 6/2018    Pulm Consult: 7/2019: Pt c/o SOB felt after w/up this was possible cord dysfunction vs rhinosinusitis.

## 2023-02-09 LAB
ALBUMIN SERPL ELPH-MCNC: 4.21 G/DL (ref 3.35–5.55)
ALPHA1 GLOB SERPL ELPH-MCNC: 0.27 G/DL (ref 0.17–0.41)
ALPHA2 GLOB SERPL ELPH-MCNC: 0.66 G/DL (ref 0.43–0.99)
ANA PATTERN 1: NORMAL
ANA SER QL IF: POSITIVE
ANA TITR SER IF: NORMAL {TITER}
B-GLOBULIN SERPL ELPH-MCNC: 0.83 G/DL (ref 0.5–1.1)
GAMMA GLOB SERPL ELPH-MCNC: 1.33 G/DL (ref 0.67–1.58)
INTERPRETATION SERPL IFE-IMP: NORMAL
PROT SERPL-MCNC: 7.3 G/DL (ref 6–8.4)

## 2023-02-10 LAB
ANTI SM ANTIBODY: 0.12 RATIO (ref 0–0.99)
ANTI SM/RNP ANTIBODY: 0.22 RATIO (ref 0–0.99)
ANTI-SM INTERPRETATION: NEGATIVE
ANTI-SM/RNP INTERPRETATION: NEGATIVE
ANTI-SSA ANTIBODY: 3.94 RATIO (ref 0–0.99)
ANTI-SSA ANTIBODY: 3.94 RATIO (ref 0–0.99)
ANTI-SSA INTERPRETATION: POSITIVE
ANTI-SSA INTERPRETATION: POSITIVE
ANTI-SSB ANTIBODY: 3.14 RATIO (ref 0–0.99)
ANTI-SSB ANTIBODY: 3.14 RATIO (ref 0–0.99)
ANTI-SSB INTERPRETATION: POSITIVE
ANTI-SSB INTERPRETATION: POSITIVE
DSDNA AB SER-ACNC: ABNORMAL [IU]/ML
PATHOLOGIST INTERPRETATION IFE: NORMAL
PATHOLOGIST INTERPRETATION SPE: NORMAL

## 2023-02-20 LAB — CRYOGLOB SER QL: NORMAL

## 2023-02-24 ENCOUNTER — PATIENT MESSAGE (OUTPATIENT)
Dept: RHEUMATOLOGY | Facility: CLINIC | Age: 43
End: 2023-02-24
Payer: COMMERCIAL

## 2023-02-27 ENCOUNTER — PATIENT MESSAGE (OUTPATIENT)
Dept: RHEUMATOLOGY | Facility: CLINIC | Age: 43
End: 2023-02-27
Payer: COMMERCIAL

## 2023-02-27 ENCOUNTER — INFUSION (OUTPATIENT)
Dept: INFUSION THERAPY | Facility: HOSPITAL | Age: 43
End: 2023-02-27
Attending: INTERNAL MEDICINE
Payer: COMMERCIAL

## 2023-02-27 VITALS
BODY MASS INDEX: 22.53 KG/M2 | DIASTOLIC BLOOD PRESSURE: 61 MMHG | WEIGHT: 140.19 LBS | HEART RATE: 110 BPM | TEMPERATURE: 98 F | RESPIRATION RATE: 16 BRPM | HEIGHT: 66 IN | SYSTOLIC BLOOD PRESSURE: 124 MMHG

## 2023-02-27 DIAGNOSIS — D89.1 CRYOGLOBULINEMIC VASCULITIS: ICD-10-CM

## 2023-02-27 DIAGNOSIS — M35.00 SJOGREN'S SYNDROME WITHOUT EXTRAGLANDULAR INVOLVEMENT: Primary | ICD-10-CM

## 2023-02-27 PROCEDURE — 25000003 PHARM REV CODE 250: Mod: PN | Performed by: INTERNAL MEDICINE

## 2023-02-27 PROCEDURE — 63600175 PHARM REV CODE 636 W HCPCS: Mod: PN | Performed by: INTERNAL MEDICINE

## 2023-02-27 PROCEDURE — 96367 TX/PROPH/DG ADDL SEQ IV INF: CPT | Mod: PN

## 2023-02-27 PROCEDURE — 96375 TX/PRO/DX INJ NEW DRUG ADDON: CPT | Mod: PN

## 2023-02-27 PROCEDURE — 96415 CHEMO IV INFUSION ADDL HR: CPT | Mod: PN

## 2023-02-27 PROCEDURE — 96413 CHEMO IV INFUSION 1 HR: CPT | Mod: PN

## 2023-02-27 RX ORDER — TRAMADOL HYDROCHLORIDE 50 MG/1
50 TABLET ORAL EVERY 6 HOURS
COMMUNITY
End: 2023-02-28 | Stop reason: SDUPTHER

## 2023-02-27 RX ORDER — METHYLPREDNISOLONE SOD SUCC 125 MG
100 VIAL (EA) INJECTION
Status: COMPLETED | OUTPATIENT
Start: 2023-02-27 | End: 2023-02-27

## 2023-02-27 RX ORDER — SODIUM CHLORIDE 0.9 % (FLUSH) 0.9 %
10 SYRINGE (ML) INJECTION
Status: CANCELLED | OUTPATIENT
Start: 2023-03-13

## 2023-02-27 RX ORDER — METHYLPREDNISOLONE SOD SUCC 125 MG
100 VIAL (EA) INJECTION
Status: CANCELLED
Start: 2023-03-13

## 2023-02-27 RX ORDER — TRAMADOL HYDROCHLORIDE 50 MG/1
50 TABLET ORAL EVERY 12 HOURS PRN
Qty: 60 TABLET | Refills: 0 | Status: CANCELLED | OUTPATIENT
Start: 2023-02-27

## 2023-02-27 RX ORDER — ACETAMINOPHEN 325 MG/1
650 TABLET ORAL
Status: CANCELLED | OUTPATIENT
Start: 2023-03-13

## 2023-02-27 RX ORDER — FAMOTIDINE 10 MG/ML
20 INJECTION INTRAVENOUS
Status: COMPLETED | OUTPATIENT
Start: 2023-02-27 | End: 2023-02-27

## 2023-02-27 RX ORDER — FAMOTIDINE 10 MG/ML
20 INJECTION INTRAVENOUS
Status: CANCELLED | OUTPATIENT
Start: 2023-03-13

## 2023-02-27 RX ORDER — ACETAMINOPHEN 325 MG/1
650 TABLET ORAL
Status: COMPLETED | OUTPATIENT
Start: 2023-02-27 | End: 2023-02-27

## 2023-02-27 RX ORDER — HEPARIN 100 UNIT/ML
500 SYRINGE INTRAVENOUS
Status: CANCELLED | OUTPATIENT
Start: 2023-03-13

## 2023-02-27 RX ADMIN — SODIUM CHLORIDE: 9 INJECTION, SOLUTION INTRAVENOUS at 11:02

## 2023-02-27 RX ADMIN — FAMOTIDINE 20 MG: 10 INJECTION INTRAVENOUS at 11:02

## 2023-02-27 RX ADMIN — SODIUM CHLORIDE 1000 MG: 0.9 INJECTION, SOLUTION INTRAVENOUS at 12:02

## 2023-02-27 RX ADMIN — DIPHENHYDRAMINE HYDROCHLORIDE 25 MG: 50 INJECTION, SOLUTION INTRAMUSCULAR; INTRAVENOUS at 11:02

## 2023-02-27 RX ADMIN — ACETAMINOPHEN 650 MG: 325 TABLET, FILM COATED ORAL at 11:02

## 2023-02-27 RX ADMIN — METHYLPREDNISOLONE SODIUM SUCCINATE 100 MG: 125 INJECTION, POWDER, FOR SOLUTION INTRAMUSCULAR; INTRAVENOUS at 11:02

## 2023-02-27 NOTE — PLAN OF CARE
Pt tolerated Ruxience infusion well.  No s/s of infusion reaction noted. Instructed to call MD with any problems

## 2023-02-28 ENCOUNTER — PATIENT MESSAGE (OUTPATIENT)
Dept: FAMILY MEDICINE | Facility: CLINIC | Age: 43
End: 2023-02-28
Payer: COMMERCIAL

## 2023-02-28 DIAGNOSIS — M54.6 LOWER THORACIC BACK PAIN: Primary | ICD-10-CM

## 2023-02-28 RX ORDER — TRAMADOL HYDROCHLORIDE 50 MG/1
50 TABLET ORAL EVERY 6 HOURS
Qty: 10 TABLET | Refills: 0 | Status: SHIPPED | OUTPATIENT
Start: 2023-02-28 | End: 2023-03-13

## 2023-03-01 NOTE — TELEPHONE ENCOUNTER
Rituxan is ordered for 1gm x 2  by 15 days. She should not need 3 appts. And this question can be addressed with the infusion staff as we do not book for them. Please send message to infusion     Dr. Buchanan

## 2023-03-09 ENCOUNTER — PATIENT MESSAGE (OUTPATIENT)
Dept: RHEUMATOLOGY | Facility: CLINIC | Age: 43
End: 2023-03-09
Payer: COMMERCIAL

## 2023-03-09 DIAGNOSIS — D89.1 CRYOGLOBULINEMIC VASCULITIS: ICD-10-CM

## 2023-03-09 DIAGNOSIS — M35.00 SJOGREN'S SYNDROME WITHOUT EXTRAGLANDULAR INVOLVEMENT: ICD-10-CM

## 2023-03-13 ENCOUNTER — PATIENT MESSAGE (OUTPATIENT)
Dept: RHEUMATOLOGY | Facility: CLINIC | Age: 43
End: 2023-03-13
Payer: COMMERCIAL

## 2023-03-13 ENCOUNTER — INFUSION (OUTPATIENT)
Dept: INFUSION THERAPY | Facility: HOSPITAL | Age: 43
End: 2023-03-13
Attending: INTERNAL MEDICINE
Payer: COMMERCIAL

## 2023-03-13 VITALS
TEMPERATURE: 98 F | RESPIRATION RATE: 16 BRPM | HEIGHT: 66 IN | SYSTOLIC BLOOD PRESSURE: 125 MMHG | WEIGHT: 140 LBS | DIASTOLIC BLOOD PRESSURE: 79 MMHG | HEART RATE: 107 BPM | BODY MASS INDEX: 22.5 KG/M2

## 2023-03-13 DIAGNOSIS — M35.00 SJOGREN'S SYNDROME WITHOUT EXTRAGLANDULAR INVOLVEMENT: Primary | ICD-10-CM

## 2023-03-13 DIAGNOSIS — D89.1 CRYOGLOBULINEMIC VASCULITIS: ICD-10-CM

## 2023-03-13 DIAGNOSIS — D89.1 CRYOGLOBULINEMIC VASCULITIS: Primary | ICD-10-CM

## 2023-03-13 DIAGNOSIS — M35.00 SJOGREN'S SYNDROME WITHOUT EXTRAGLANDULAR INVOLVEMENT: ICD-10-CM

## 2023-03-13 PROCEDURE — 25000003 PHARM REV CODE 250: Mod: PN | Performed by: INTERNAL MEDICINE

## 2023-03-13 PROCEDURE — 63600175 PHARM REV CODE 636 W HCPCS: Mod: PN | Performed by: INTERNAL MEDICINE

## 2023-03-13 PROCEDURE — A4216 STERILE WATER/SALINE, 10 ML: HCPCS | Mod: PN | Performed by: INTERNAL MEDICINE

## 2023-03-13 PROCEDURE — 96413 CHEMO IV INFUSION 1 HR: CPT | Mod: PN

## 2023-03-13 PROCEDURE — 96415 CHEMO IV INFUSION ADDL HR: CPT | Mod: PN

## 2023-03-13 PROCEDURE — 96375 TX/PRO/DX INJ NEW DRUG ADDON: CPT | Mod: PN

## 2023-03-13 PROCEDURE — 96367 TX/PROPH/DG ADDL SEQ IV INF: CPT | Mod: PN

## 2023-03-13 RX ORDER — FAMOTIDINE 10 MG/ML
20 INJECTION INTRAVENOUS
Status: COMPLETED | OUTPATIENT
Start: 2023-03-13 | End: 2023-03-13

## 2023-03-13 RX ORDER — SODIUM CHLORIDE 0.9 % (FLUSH) 0.9 %
10 SYRINGE (ML) INJECTION
Status: CANCELLED | OUTPATIENT
Start: 2023-03-27

## 2023-03-13 RX ORDER — ACETAMINOPHEN 325 MG/1
650 TABLET ORAL
Status: COMPLETED | OUTPATIENT
Start: 2023-03-13 | End: 2023-03-13

## 2023-03-13 RX ORDER — SODIUM CHLORIDE 0.9 % (FLUSH) 0.9 %
10 SYRINGE (ML) INJECTION
Status: DISCONTINUED | OUTPATIENT
Start: 2023-03-13 | End: 2023-03-13 | Stop reason: HOSPADM

## 2023-03-13 RX ORDER — METHYLPREDNISOLONE SOD SUCC 125 MG
100 VIAL (EA) INJECTION
Status: CANCELLED
Start: 2023-03-27

## 2023-03-13 RX ORDER — TRAMADOL HYDROCHLORIDE 50 MG/1
50-100 TABLET ORAL
Qty: 120 TABLET | Refills: 3 | Status: SHIPPED | OUTPATIENT
Start: 2023-03-27 | End: 2023-03-13 | Stop reason: SDUPTHER

## 2023-03-13 RX ORDER — METHYLPREDNISOLONE SOD SUCC 125 MG
100 VIAL (EA) INJECTION
Status: COMPLETED | OUTPATIENT
Start: 2023-03-13 | End: 2023-03-13

## 2023-03-13 RX ORDER — TRAMADOL HYDROCHLORIDE 50 MG/1
50 TABLET ORAL EVERY 8 HOURS PRN
Qty: 40 TABLET | Refills: 3 | Status: SHIPPED | OUTPATIENT
Start: 2023-03-13 | End: 2023-09-25 | Stop reason: SDUPTHER

## 2023-03-13 RX ORDER — HEPARIN 100 UNIT/ML
500 SYRINGE INTRAVENOUS
Status: CANCELLED | OUTPATIENT
Start: 2023-03-27

## 2023-03-13 RX ORDER — FAMOTIDINE 10 MG/ML
20 INJECTION INTRAVENOUS
Status: CANCELLED | OUTPATIENT
Start: 2023-03-27

## 2023-03-13 RX ORDER — ACETAMINOPHEN 325 MG/1
650 TABLET ORAL
Status: CANCELLED | OUTPATIENT
Start: 2023-03-27

## 2023-03-13 RX ADMIN — DIPHENHYDRAMINE HYDROCHLORIDE 25 MG: 50 INJECTION INTRAMUSCULAR; INTRAVENOUS at 11:03

## 2023-03-13 RX ADMIN — SODIUM CHLORIDE: 9 INJECTION, SOLUTION INTRAVENOUS at 10:03

## 2023-03-13 RX ADMIN — SODIUM CHLORIDE 1000 MG: 0.9 INJECTION, SOLUTION INTRAVENOUS at 11:03

## 2023-03-13 RX ADMIN — METHYLPREDNISOLONE SODIUM SUCCINATE 100 MG: 125 INJECTION, POWDER, FOR SOLUTION INTRAMUSCULAR; INTRAVENOUS at 11:03

## 2023-03-13 RX ADMIN — FAMOTIDINE 20 MG: 10 INJECTION INTRAVENOUS at 11:03

## 2023-03-13 RX ADMIN — Medication 10 ML: at 10:03

## 2023-03-13 RX ADMIN — ACETAMINOPHEN 650 MG: 325 TABLET, FILM COATED ORAL at 11:03

## 2023-03-13 NOTE — PLAN OF CARE
Problem: Adult Inpatient Plan of Care  Goal: Plan of Care Review  Outcome: Ongoing, Progressing  Goal: Patient-Specific Goal (Individualized)  Outcome: Ongoing, Progressing  Flowsheets (Taken 3/13/2023 1108)  Anxieties, Fears or Concerns: none  Individualized Care Needs: recliner, blanket, pillow          Problem: Fatigue  Goal: Improved Activity Tolerance  3/13/2023 1108 by Veronika Garcia RN  Outcome: Ongoing, Progressing  3/13/2023 1108 by Veronika Garcia RN  Outcome: Ongoing, Progressing  Intervention: Promote Improved Energy  Flowsheets (Taken 3/13/2023 1108)  Fatigue Management:   activity schedule adjusted   paced activity encouraged   frequent rest breaks encouraged  Sleep/Rest Enhancement:   natural light exposure provided   noise level reduced   reading promoted   room darkened  Activity Management:   Ambulated -L4   Ambulated to bathroom - L4   Ambulated in [place]d - L4   Up in stretcher chair - L1

## 2023-03-13 NOTE — NURSING
Pt arrived to clinic today for Ruxience infusion and tolerated well. No changes throughout therapy. Pt aware of follow up appointments and side effects of drugs. Discharged to home. NAD.

## 2023-03-13 NOTE — TELEPHONE ENCOUNTER
I noted PCP had filled the script assumed that no further action was warranted.   I have not given her tramadol since 6/7/22 and did not think this was an ongoing script.     I can fill a more modest script for next month to have on hand for pain.     Dr. Buchanan

## 2023-03-20 ENCOUNTER — OFFICE VISIT (OUTPATIENT)
Dept: NEUROLOGY | Facility: CLINIC | Age: 43
End: 2023-03-20
Payer: COMMERCIAL

## 2023-03-20 VITALS
RESPIRATION RATE: 17 BRPM | HEART RATE: 111 BPM | SYSTOLIC BLOOD PRESSURE: 133 MMHG | DIASTOLIC BLOOD PRESSURE: 92 MMHG | WEIGHT: 137.69 LBS | BODY MASS INDEX: 22.13 KG/M2 | HEIGHT: 66 IN

## 2023-03-20 DIAGNOSIS — R51.9 WORSENING HEADACHES: Primary | ICD-10-CM

## 2023-03-20 DIAGNOSIS — M54.2 NECK PAIN: ICD-10-CM

## 2023-03-20 DIAGNOSIS — M54.2 CERVICALGIA: ICD-10-CM

## 2023-03-20 DIAGNOSIS — R29.898 ARM WEAKNESS: ICD-10-CM

## 2023-03-20 PROCEDURE — 1159F MED LIST DOCD IN RCRD: CPT | Mod: CPTII,S$GLB,, | Performed by: PHYSICIAN ASSISTANT

## 2023-03-20 PROCEDURE — 99999 PR PBB SHADOW E&M-EST. PATIENT-LVL V: CPT | Mod: PBBFAC,,, | Performed by: PHYSICIAN ASSISTANT

## 2023-03-20 PROCEDURE — 99999 PR PBB SHADOW E&M-EST. PATIENT-LVL V: ICD-10-PCS | Mod: PBBFAC,,, | Performed by: PHYSICIAN ASSISTANT

## 2023-03-20 PROCEDURE — 3080F PR MOST RECENT DIASTOLIC BLOOD PRESSURE >= 90 MM HG: ICD-10-PCS | Mod: CPTII,S$GLB,, | Performed by: PHYSICIAN ASSISTANT

## 2023-03-20 PROCEDURE — 1160F RVW MEDS BY RX/DR IN RCRD: CPT | Mod: CPTII,S$GLB,, | Performed by: PHYSICIAN ASSISTANT

## 2023-03-20 PROCEDURE — 3008F PR BODY MASS INDEX (BMI) DOCUMENTED: ICD-10-PCS | Mod: CPTII,S$GLB,, | Performed by: PHYSICIAN ASSISTANT

## 2023-03-20 PROCEDURE — 1160F PR REVIEW ALL MEDS BY PRESCRIBER/CLIN PHARMACIST DOCUMENTED: ICD-10-PCS | Mod: CPTII,S$GLB,, | Performed by: PHYSICIAN ASSISTANT

## 2023-03-20 PROCEDURE — 3075F SYST BP GE 130 - 139MM HG: CPT | Mod: CPTII,S$GLB,, | Performed by: PHYSICIAN ASSISTANT

## 2023-03-20 PROCEDURE — 3080F DIAST BP >= 90 MM HG: CPT | Mod: CPTII,S$GLB,, | Performed by: PHYSICIAN ASSISTANT

## 2023-03-20 PROCEDURE — 3008F BODY MASS INDEX DOCD: CPT | Mod: CPTII,S$GLB,, | Performed by: PHYSICIAN ASSISTANT

## 2023-03-20 PROCEDURE — 3075F PR MOST RECENT SYSTOLIC BLOOD PRESS GE 130-139MM HG: ICD-10-PCS | Mod: CPTII,S$GLB,, | Performed by: PHYSICIAN ASSISTANT

## 2023-03-20 PROCEDURE — 99214 PR OFFICE/OUTPT VISIT, EST, LEVL IV, 30-39 MIN: ICD-10-PCS | Mod: S$GLB,,, | Performed by: PHYSICIAN ASSISTANT

## 2023-03-20 PROCEDURE — 1159F PR MEDICATION LIST DOCUMENTED IN MEDICAL RECORD: ICD-10-PCS | Mod: CPTII,S$GLB,, | Performed by: PHYSICIAN ASSISTANT

## 2023-03-20 PROCEDURE — 99214 OFFICE O/P EST MOD 30 MIN: CPT | Mod: S$GLB,,, | Performed by: PHYSICIAN ASSISTANT

## 2023-03-20 RX ORDER — ATOGEPANT 60 MG/1
TABLET ORAL
Qty: 30 TABLET | Refills: 6 | Status: SHIPPED | OUTPATIENT
Start: 2023-03-20 | End: 2023-09-15 | Stop reason: SDUPTHER

## 2023-03-20 RX ORDER — PREGABALIN 25 MG/1
25 CAPSULE ORAL 2 TIMES DAILY
Qty: 60 CAPSULE | Refills: 6 | Status: SHIPPED | OUTPATIENT
Start: 2023-03-20 | End: 2023-04-17

## 2023-03-20 NOTE — PROGRESS NOTES
[facility]  [address] [facility] [address]  [place] [address]  Phone:[phone]  Fax: [phone]  Follow up visit     Patient Name: Shamika Young  : 1980  MRN:  [id number]  Today: 3/20/2023   LOV: 2022  chief complaint: Headache      PCP: Julia Sargent MD.    Assessment:   Shamika Young is a 42 y.o. 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  HA worsened recently (coming from back of the neck and radiating up/around) with new onset weakness. Will get imaging studies to ensure no structural cause.      Review:    ICD-10-CM ICD-9-CM   1. Worsening headaches  R51.9 784.0   2. Arm weakness  R29.898 729.89   3. Neck pain  M54.2 723.1   4. Cervicalgia  M54.2 723.1             Plan:   MRI C spine ordered, I will comment on results electronically        For HA Prevention:  1)  stop emgality (previously did so a few months ago), start qulipta, discussed adv effects/dosing, she agreed  2) PT ordered  3) lyrica 25 mg BID ordered, discussed adv effects/dosing, she agreed, if not working, as she is on tramadol therapy, discussed tolerance with lyrica and may  need to find another medication. She agreed.   4) mood medications per other providers      For HA :  Has nurtec, may need to consider ubrelvy        To break up Headaches:  Can consider nerve blocks again        Other:  N/A           All test results as well as any necessary instructions were reviewed and discussed with patient.    Review:  Orders Placed This Encounter    MRI Cervical Spine Without Contrast    Ambulatory referral/consult to Physical/Occupational Therapy    atogepant (QULIPTA) 60 mg Tab    pregabalin (LYRICA) 25 MG capsule             Patient to return to PCP/other specialists for all other problems  Patient to continue on all medications as Rx'd  Full office  "note available online   RTO-after testing   The patient indicates understanding of these issues and agrees to the plan.    HPI:   Shamika Young is a 42 y.o. 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     At last visit:emgality continued, mood medicines per other providers, nurtec for HA  and GONB/TPI given.   3-4 HD a week in past month, historically was a lot less, no trigger idenfitied   Pain starts in back of the head and radiates around to the eyes   Has had some weakness in her right hand has noticed some discomfort in her neck  Has been off emgality for a few months, "I couldn't remember to take it"  Using tramadol daily for past month d/t neck pain  Feels the pain is making her "turn her head to the left"  No dysphagia  No incontinence  No falls  No recent trauma   Has weakness in the arm on right though pain more in LEFT neck/occiput        From previous visit: had emgality, nurtec and zanaflex.  10 HD a month  Had mild-moderate HA now  Pain in occipitalis and trapezius  + photophobia  Zanaflex not helping/stopped taking it  Nurtec does help  Emgality is helping   Chocolate and menses are a trigger   "I really have benefit from TPI, can I please have one?"     From previous visits:wrote for emgality and nurtec.   HA weather related (?) and sleep deprivation "I stay up late with my kids watching television"  Emgality helping   Stopped taking BC  Pain along frontalis  nurtec is hit/miss  8 HD in past month  Severity: 4-8/10  HA last: 12-15 hours     HA HPI:  Start:Since HS, took triptans, and seemed to aide, and in  returned  (frequency), started new BC about a month and noticed frequency intensified   History of trauma (no), History of CNS infection (no), History of Stroke (no)  Location:frontalis and retroorbital and more to the top of the head, occasionally " down the cheek (right)  Severity: range: 3-6/10  Duration:8-12 hours in a day   Frequency:12-15 HD a month   Associated factors (bolded positive) WITH HA  (or migraine): Nausea, vomiting, photophobia, phonophobia, tinnitus, scalp pain, vision loss, diplopia, scintillations, eye pain, jaw pain, weakness?    Tried:maxalt, no preventative   Triggers (in bold): stress, lack of sleep, too much caffeine(soda and coffee), too little caffeine, weather change, seasonal change, strong odours, bright lights, sunlight, food  Last HA: yesterday  Positives in bold: Hx of Kidney Stones, asthma (as a child), GI bleed, osteoporosis, CAD/MI, CVA/TIA, DM    Imaging on file: none   Therapies tried in past: (failures to be marked, if known---why did it fail?)  vyvanse  wellbutrin  maxalt  Prednisone   buspar  zofran  imitrex  depakote  Tizandine  Gabapentin -didn't tolerate  lyrica      Medication Reconciliation:   Current Outpatient Medications   Medication Sig Dispense Refill    albuterol (PROVENTIL/VENTOLIN HFA) 90 mcg/actuation inhaler INHALE 2 PUFFS INTO LUNGS EVERY 6 HOURS AS NEEDED WHEEZING 18 g 11    ALPRAZolam (XANAX) 0.5 MG tablet Take 0.5 mg by mouth 3 (three) times daily.      buPROPion (WELLBUTRIN) 100 MG tablet       busPIRone (BUSPAR) 10 MG tablet Take 10 mg by mouth 3 (three) times daily.      cevimeline (EVOXAC) 30 mg capsule Take 1 capsule (30 mg total) by mouth 3 (three) times daily. 270 capsule 3    CHOLINE ORAL Take by mouth.      ergocalciferol, vitamin D2, (VITAMIN D ORAL) Take by mouth.      galcanezumab-gnlm (EMGALITY PEN) 120 mg/mL PnIj Inject 1 pen (120 mg) into the skin every 28 days. maintenance 1 mL 11    hydrOXYchloroQUINE (PLAQUENIL) 200 mg tablet Take 1 tablet (200 mg total) by mouth 2 (two) times a day. 60 tablet 11    Lactobac no.41/Bifidobact no.7 (PROBIOTIC-10 ORAL) Take by mouth. bromoline      LORazepam (ATIVAN) 1 MG tablet Take 1 mg by mouth daily as needed.      methylphenidate HCl (RITALIN) 10  MG tablet Take 10 mg by mouth once daily.      NURTEC 75 mg odt       predniSONE (DELTASONE) 5 MG tablet Take 1-4 tablets (5-20 mg total) by mouth once daily. 120 tablet 11    thymol/chlorophyllin (CHLOROPHYLL ORAL) Take by mouth.      traMADoL (ULTRAM) 50 mg tablet Take 1 tablet (50 mg total) by mouth every 8 (eight) hours as needed for Pain (joint pains dx Primary Sjogrens with vasculitis.). 40 tablet 3    TYROSINE ORAL Take by mouth.      VYVANSE 30 mg capsule Take 30 mg by mouth nightly.       No current facility-administered medications for this visit.     Review of patient's allergies indicates:   Allergen Reactions    Tree nut Anaphylaxis    Tree nuts Anaphylaxis    Biaxin [clarithromycin] Other (See Comments)     GI symptoms     Azithromycin Rash    Ceclor [cefaclor] Rash    Ciprofloxacin Rash    Penicillins Rash    Tetracyclines Hives       PMHx:  Past Medical History:   Diagnosis Date    Allergy     previously on immunotherapy    Anxiety     Depression     Headache     Lumbago with sciatica, right side     Lumbar disc disease     L4-L5    Other chronic pain     Panic disorder     Positive CHELSEA (antinuclear antibody)     Purpura     Sjogren's syndrome     Vasculitis      Past Surgical History:   Procedure Laterality Date    CYST REMOVAL  2012    mandibular    HYSTEROSCOPY N/A 7/17/2020    Procedure: HYSTEROSCOPY/ with IUD removal;  Surgeon: Cherry Christine MD;  Location: [facility];  Service: OB/GYN;  Laterality: N/A;    INTRAUTERINE DEVICE INSERTION  7/17/2020    Procedure: INSERTION, INTRAUTERINE DEVICE;  Surgeon: Cherry Christine MD;  Location: [facility];  Service: OB/GYN;;    lumbar spine procedure      2004/2014    REMOVAL OF INTRAUTERINE DEVICE (IUD)  7/17/2020    Procedure: REMOVAL, INTRAUTERINE DEVICE;  Surgeon: Cherry Christine MD;  Location: [facility];  Service: OB/GYN;;    TONSILLECTOMY AND ADENOIDECTOMY      7th grade       Fhx:  Family History   Problem Relation Age of Onset    Multiple sclerosis  "Mother     Heart disease Mother     Diabetes Mother     Hypertension Mother     Heart disease Father     Hyperlipidemia Father     Glaucoma Neg Hx     Retinal detachment Neg Hx     Macular degeneration Neg Hx        Shx:   Social History     Socioeconomic History    Marital status:    Tobacco Use    Smoking status: Former    Smokeless tobacco: Never   Substance and Sexual Activity    Alcohol use: Yes     Comment: socially    Drug use: Never           Labs:   Reviewed in chart     Imaging:   Reviewed in chart       Other testing:  Reviewed in chart     Note: I have independently reviewed any/all imaging/labs/tests and agree with the report (s) as documented.  Any discrepancies will be as noted/demarcated by free text.  AUGIE MG 3/20/2023                     ROS:   Review Of Systems (questions asked, positive or additions in BOLD)  Gen: Weight change, fatigue/malaise, pyrexia   HEENT: Tinnitus, headache,  blurred vision, eye pain, diplopia, photophobia,  nose bleeds, congestion, sore throat, jaw pain, scalp pain, neck stiffness   Card: Palpitations, CP   Pulm: SOB, cough   Vas: Easy bruising, easy bleeding   GI: N/V/D/C, incontinence, hematemesis, hematochezia    : incontinence, hematuria   Endocrine: Temp intolerance, polyuria, polydipsia   M/S: Neck pain, myalgia, back pain, joint pain, falls    Neuro: PER HPI   PSY: Memory loss, confusion, depression, anxiety, trouble in sleep          EXAM:      BP (!) 133/92 (BP Location: Left arm, Patient Position: Sitting, BP Method: Medium (Automatic))   Pulse (!) 111   Resp 17   Ht 5' 6" (1.676 m)   Wt 62.5 kg (137 lb 10.8 oz)   BMI 22.22 kg/m²    GEN:  NAD  HEENT: NC/AT, Frontalis was NTTP, temporalis was NTTP,  nares patent, dentition appropriate neck supple, trachea midline, trapezius and occipitalis TTP      EXTREM:    no edema present.    NEURO:  Mental Status:  Awake, alert and appropriately oriented to time, place, and person.  Normal attention and " concentration.  Speech is fluent and appropriate language function (I.e., comprehension, repetition, etc).     Cranial Nerves:    Extraocular movements are intact and without nystagmus.  Visual fields are full to confrontation testing.   Facial movement is symmetric.  Facial sensation is intact.  Hearing is normal. Uvula in midline. DROM of neck in all (6) directions, shoulder shrug symmetrical. Tongue in midline without fasiculation.     Motor:  RUE:appropriate against gravity, prox 5-5, distal 4+/5              LUE: appropriate against gravity, prox/distal 5/5              RLE:appropriate against gravity , prox 5/5              LLE: appropriate against gravity, prox 5/5  Tremor/pronator drift not apparent.   Opponens, lumbrical and finger extension reduced RUE     Sensory:  intact touch, temp, prop and graphesthesia bilat UE     DTR: 2+ bilat biceps, brachiorad and patellar     Gait and Stance: Normal manner of stance and gait function testing.          This document has been electronically signed by Mr. Eduar Serra MPA, PA-C on 3/20/2023, I have personally typed this message using the EMR.       Dr Daniel MD was available during today's visit.     Personal Protective Equipment:    Personal Protective Equipment was used during this encounter including:surgical mask and non latex gloves.          CC: Julia Sargent MD

## 2023-03-23 ENCOUNTER — TELEPHONE (OUTPATIENT)
Dept: PHARMACY | Facility: CLINIC | Age: 43
End: 2023-03-23
Payer: COMMERCIAL

## 2023-03-24 ENCOUNTER — TELEPHONE (OUTPATIENT)
Dept: NEUROLOGY | Facility: CLINIC | Age: 43
End: 2023-03-24
Payer: COMMERCIAL

## 2023-03-24 DIAGNOSIS — M54.2 CERVICALGIA: Primary | ICD-10-CM

## 2023-03-24 DIAGNOSIS — G43.709 CHRONIC MIGRAINE WITHOUT AURA WITHOUT STATUS MIGRAINOSUS, NOT INTRACTABLE: ICD-10-CM

## 2023-03-24 NOTE — TELEPHONE ENCOUNTER
----- Message from Maria Victoria Manzanares sent at 3/24/2023  2:24 PM CDT -----  Contact: [phone]  Type: Needs Medical Advice  Who Called:  Pt     Best Call Back Number: [phone]    Additional Information: Pt is calling to ask if she can be referred to [facility] in [place] for physical therapy. Pls call back and advise

## 2023-03-24 NOTE — TELEPHONE ENCOUNTER
Called patient and notified will send message to PILLO Valero to do the Referral to PT for her. He has left for the weekend so this will not be done until at least Monday. Requested that patient message us with the fax number for the facility that she wants to go to. Verbalized understanding.

## 2023-03-27 ENCOUNTER — PATIENT MESSAGE (OUTPATIENT)
Dept: NEUROLOGY | Facility: CLINIC | Age: 43
End: 2023-03-27
Payer: COMMERCIAL

## 2023-03-29 ENCOUNTER — HOSPITAL ENCOUNTER (OUTPATIENT)
Dept: RADIOLOGY | Facility: HOSPITAL | Age: 43
Discharge: HOME | End: 2023-03-29
Attending: PHYSICIAN ASSISTANT
Payer: COMMERCIAL

## 2023-03-29 DIAGNOSIS — R51.9 WORSENING HEADACHES: ICD-10-CM

## 2023-03-29 DIAGNOSIS — M54.2 NECK PAIN: ICD-10-CM

## 2023-03-29 DIAGNOSIS — M54.2 CERVICALGIA: ICD-10-CM

## 2023-03-29 DIAGNOSIS — R29.898 ARM WEAKNESS: ICD-10-CM

## 2023-03-29 PROCEDURE — 72141 MRI CERVICAL SPINE WITHOUT CONTRAST: ICD-10-PCS | Mod: 26,,, | Performed by: RADIOLOGY

## 2023-03-29 PROCEDURE — 72141 MRI NECK SPINE W/O DYE: CPT | Mod: TC,PO

## 2023-03-29 PROCEDURE — 72141 MRI NECK SPINE W/O DYE: CPT | Mod: 26,,, | Performed by: RADIOLOGY

## 2023-03-30 ENCOUNTER — PATIENT MESSAGE (OUTPATIENT)
Dept: NEUROLOGY | Facility: CLINIC | Age: 43
End: 2023-03-30
Payer: COMMERCIAL

## 2023-03-30 DIAGNOSIS — M54.2 CERVICALGIA: Primary | ICD-10-CM

## 2023-03-30 DIAGNOSIS — M50.90 CERVICAL DISC DISEASE: ICD-10-CM

## 2023-03-30 DIAGNOSIS — M47.812 OSTEOARTHRITIS OF CERVICAL SPINE, UNSPECIFIED SPINAL OSTEOARTHRITIS COMPLICATION STATUS: ICD-10-CM

## 2023-04-08 ENCOUNTER — PATIENT MESSAGE (OUTPATIENT)
Dept: NEUROLOGY | Facility: CLINIC | Age: 43
End: 2023-04-08
Payer: COMMERCIAL

## 2023-04-11 ENCOUNTER — PATIENT MESSAGE (OUTPATIENT)
Dept: ADMINISTRATIVE | Facility: HOSPITAL | Age: 43
End: 2023-04-11
Payer: COMMERCIAL

## 2023-04-17 ENCOUNTER — PATIENT MESSAGE (OUTPATIENT)
Dept: RHEUMATOLOGY | Facility: CLINIC | Age: 43
End: 2023-04-17
Payer: COMMERCIAL

## 2023-04-17 ENCOUNTER — OFFICE VISIT (OUTPATIENT)
Dept: FAMILY MEDICINE | Facility: CLINIC | Age: 43
End: 2023-04-17
Payer: COMMERCIAL

## 2023-04-17 VITALS
BODY MASS INDEX: 22.22 KG/M2 | HEIGHT: 66 IN | DIASTOLIC BLOOD PRESSURE: 62 MMHG | HEART RATE: 110 BPM | SYSTOLIC BLOOD PRESSURE: 120 MMHG | TEMPERATURE: 99 F

## 2023-04-17 DIAGNOSIS — J06.9 VIRAL URI WITH COUGH: Primary | ICD-10-CM

## 2023-04-17 DIAGNOSIS — M79.10 MYALGIA: ICD-10-CM

## 2023-04-17 LAB
CTP QC/QA: YES
MOLECULAR STREP A: NEGATIVE
POC MOLECULAR INFLUENZA A AGN: NEGATIVE
POC MOLECULAR INFLUENZA B AGN: NEGATIVE
SARS-COV-2 RDRP RESP QL NAA+PROBE: NEGATIVE

## 2023-04-17 PROCEDURE — 3078F PR MOST RECENT DIASTOLIC BLOOD PRESSURE < 80 MM HG: ICD-10-PCS | Mod: CPTII,S$GLB,,

## 2023-04-17 PROCEDURE — 3074F PR MOST RECENT SYSTOLIC BLOOD PRESSURE < 130 MM HG: ICD-10-PCS | Mod: CPTII,S$GLB,,

## 2023-04-17 PROCEDURE — 87651 POCT STREP A MOLECULAR: ICD-10-PCS | Mod: QW,S$GLB,,

## 2023-04-17 PROCEDURE — 87502 POCT INFLUENZA A/B MOLECULAR: ICD-10-PCS | Mod: QW,S$GLB,,

## 2023-04-17 PROCEDURE — 3074F SYST BP LT 130 MM HG: CPT | Mod: CPTII,S$GLB,,

## 2023-04-17 PROCEDURE — 87502 INFLUENZA DNA AMP PROBE: CPT | Mod: QW,S$GLB,,

## 2023-04-17 PROCEDURE — 87635 SARS-COV-2 COVID-19 AMP PRB: CPT | Mod: QW,S$GLB,,

## 2023-04-17 PROCEDURE — 99999 PR PBB SHADOW E&M-EST. PATIENT-LVL III: CPT | Mod: PBBFAC,,,

## 2023-04-17 PROCEDURE — 3078F DIAST BP <80 MM HG: CPT | Mod: CPTII,S$GLB,,

## 2023-04-17 PROCEDURE — 87635: ICD-10-PCS | Mod: QW,S$GLB,,

## 2023-04-17 PROCEDURE — 87651 STREP A DNA AMP PROBE: CPT | Mod: QW,S$GLB,,

## 2023-04-17 PROCEDURE — 99213 PR OFFICE/OUTPT VISIT, EST, LEVL III, 20-29 MIN: ICD-10-PCS | Mod: S$GLB,,,

## 2023-04-17 PROCEDURE — 3008F BODY MASS INDEX DOCD: CPT | Mod: CPTII,S$GLB,,

## 2023-04-17 PROCEDURE — 99213 OFFICE O/P EST LOW 20 MIN: CPT | Mod: S$GLB,,,

## 2023-04-17 PROCEDURE — 3008F PR BODY MASS INDEX (BMI) DOCUMENTED: ICD-10-PCS | Mod: CPTII,S$GLB,,

## 2023-04-17 PROCEDURE — 99999 PR PBB SHADOW E&M-EST. PATIENT-LVL III: ICD-10-PCS | Mod: PBBFAC,,,

## 2023-04-17 RX ORDER — BUPROPION HYDROCHLORIDE 75 MG/1
75 TABLET ORAL DAILY
COMMUNITY

## 2023-04-17 RX ORDER — METHYLPHENIDATE HYDROCHLORIDE 20 MG/1
20 TABLET ORAL DAILY
COMMUNITY

## 2023-04-17 NOTE — LETTER
April 17, 2023      [facility]  [address]  [place] [address]  Phone: [phone]  Fax: [phone]       Patient: Shamika Young   YOB: 1980  Date of Visit: 04/17/2023    To Whom It May Concern:    Juliana Young  was at [facility] on 04/17/2023. The patient may return to work with no restrictions when fever free for 24 hours. If you have any questions or concerns, or if I can be of further assistance, please do not hesitate to contact me.    Sincerely,    Ashtyn Sanford PA-C/  Lashae Pereyra LPN

## 2023-04-17 NOTE — PROGRESS NOTES
"[facility]  [address]  [place] [address]    Subjective    Chief Complaint:   Chief Complaint   Patient presents with    Fatigue    Sinus Problem     Fatigue, achey, cough, sinus, "myalgia"       History of Present Illness:     Shamika Young is a(n) 42 y.o. female with past medical history as noted below who presents to the clinic today for 3 day onset body aches.     For the past 3 days she has felt achy with a dry cough and sinus congestion. Denies fever, sputum production, sick contacts, ear pain, or sore throat. Myalgias have been her most bothersome symptom. She is requesting to be tested for strep. Her son at home has been sick with similar symptoms.     Problem List:   Patient Active Problem List   Diagnosis    Malpositioned IUD, sequela    Encounter for IUD removal and reinsertion    Status post hysteroscopy    Cryoglobulinemic vasculitis    Sjogren's syndrome without extraglandular involvement       Current Outpatient Medications:   Current Outpatient Medications   Medication Instructions    albuterol (PROVENTIL/VENTOLIN HFA) 90 mcg/actuation inhaler INHALE 2 PUFFS INTO LUNGS EVERY 6 HOURS AS NEEDED WHEEZING    ALPRAZolam (XANAX) 0.5 mg, Oral, 3 times daily    atogepant (QULIPTA) 60 mg Tab Take 1 tablet by mouth daily    buPROPion (WELLBUTRIN) 75 mg, Oral, Daily    busPIRone (BUSPAR) 10 mg, Oral, 3 times daily    cevimeline (EVOXAC) 30 mg, Oral, 3 times daily    CHOLINE ORAL Oral    ergocalciferol, vitamin D2, (VITAMIN D ORAL) Oral    hydrOXYchloroQUINE (PLAQUENIL) 200 mg, Oral, 2 times daily    Lactobac no.41/Bifidobact no.7 (PROBIOTIC-10 ORAL) Oral, bromoline    LORazepam (ATIVAN) 1 mg, Oral, Daily PRN    methylphenidate HCl (RITALIN) 20 mg, Oral, Daily    NURTEC 75 mg, Oral, Once as needed    predniSONE (DELTASONE) 5-20 mg, Oral, Daily    thymol/chlorophyllin (CHLOROPHYLL ORAL) Oral    traMADoL (ULTRAM) 50 mg, Oral, Every 8 hours PRN    TYROSINE " "ORAL Oral    VYVANSE 30 mg, Oral, Nightly       Surgical History:   Past Surgical History:   Procedure Laterality Date    CYST REMOVAL  2012    mandibular    HYSTEROSCOPY N/A 7/17/2020    Procedure: HYSTEROSCOPY/ with IUD removal;  Surgeon: Cherry Chritsine MD;  Location: [facility];  Service: OB/GYN;  Laterality: N/A;    INTRAUTERINE DEVICE INSERTION  7/17/2020    Procedure: INSERTION, INTRAUTERINE DEVICE;  Surgeon: Cherry Christine MD;  Location: [facility];  Service: OB/GYN;;    lumbar spine procedure      2004/2014    REMOVAL OF INTRAUTERINE DEVICE (IUD)  7/17/2020    Procedure: REMOVAL, INTRAUTERINE DEVICE;  Surgeon: Cherry Christine MD;  Location: [facility];  Service: OB/GYN;;    TONSILLECTOMY AND ADENOIDECTOMY      7th grade       Family History:   Family History   Problem Relation Age of Onset    Multiple sclerosis Mother     Heart disease Mother     Diabetes Mother     Hypertension Mother     Heart disease Father     Hyperlipidemia Father     Glaucoma Neg Hx     Retinal detachment Neg Hx     Macular degeneration Neg Hx        Allergies:   Review of patient's allergies indicates:   Allergen Reactions    Tree nut Anaphylaxis    Tree nuts Anaphylaxis    Biaxin [clarithromycin] Other (See Comments)     GI symptoms     Erythromycin      Unknown    Azithromycin Rash    Ceclor [cefaclor] Rash    Ciprofloxacin Rash    Penicillins Rash    Tetracyclines Hives       Tobacco Status:   Tobacco Use: Medium Risk    Smoking Tobacco Use: Former    Smokeless Tobacco Use: Never    Passive Exposure: Not on file       Sexual Activity:   Social History     Substance and Sexual Activity   Sexual Activity Not on file       Alcohol Use:   Social History     Substance and Sexual Activity   Alcohol Use Yes    Comment: socially         Objective       Vitals:    04/17/23 1509   BP: 120/62   BP Location: Right arm   Pulse: 110   Temp: 98.7 °F (37.1 °C)   TempSrc: Oral   Height: 5' 6" (1.676 m)       Review of Systems   Constitutional:  " Negative for chills and fever.   HENT:  Positive for congestion. Negative for ear pain, sinus pain and sore throat.    Respiratory:  Negative for sputum production and shortness of breath.    Musculoskeletal:  Positive for myalgias.     Physical Exam  Constitutional:       General: She is not in acute distress.     Appearance: Normal appearance.   HENT:      Head: Normocephalic and atraumatic.      Right Ear: Tympanic membrane, ear canal and external ear normal.      Left Ear: Tympanic membrane and external ear normal.      Nose: Nose normal. No congestion.      Right Sinus: No maxillary sinus tenderness or frontal sinus tenderness.      Left Sinus: No maxillary sinus tenderness or frontal sinus tenderness.      Mouth/Throat:      Mouth: Mucous membranes are moist.      Pharynx: Oropharynx is clear. No oropharyngeal exudate or posterior oropharyngeal erythema.   Eyes:      Pupils: Pupils are equal, round, and reactive to light.   Cardiovascular:      Rate and Rhythm: Normal rate and regular rhythm.      Heart sounds: Normal heart sounds. No murmur heard.  Pulmonary:      Effort: Pulmonary effort is normal. No respiratory distress.      Breath sounds: Normal breath sounds. No wheezing, rhonchi or rales.   Musculoskeletal:      Cervical back: Normal range of motion.   Lymphadenopathy:      Cervical: No cervical adenopathy.   Skin:     General: Skin is warm.   Neurological:      Mental Status: She is alert and oriented to person, place, and time.   Psychiatric:         Behavior: Behavior normal.         Assessment and Plan:    1. Viral URI with cough  -     POCT Strep A, Molecular    2. Myalgia  -     POCT COVID-19 Rapid Screening  -     POCT Influenza A/B Molecular        Visit summary:    Shamika Young was seen today for viral URI.     Presentation consistent with viral cause of illness. I do not suspect a bacterial cause of symptoms today. Patient was instructed to follow up if symptoms do not improve or worsen and  to report to ER with severe symptoms.     OTC analgesics (Tylenol or ibuprofen) recommended for myalgias. Rest, hydrate. I recommend Mucinex DM for congestion.    Patient was instructed to report to ER if symptoms become severe.    Follow up: No follow-ups on file.      Ashtyn Sanford PA-C

## 2023-04-18 ENCOUNTER — PATIENT MESSAGE (OUTPATIENT)
Dept: FAMILY MEDICINE | Facility: CLINIC | Age: 43
End: 2023-04-18
Payer: COMMERCIAL

## 2023-04-18 ENCOUNTER — TELEPHONE (OUTPATIENT)
Dept: FAMILY MEDICINE | Facility: CLINIC | Age: 43
End: 2023-04-18
Payer: COMMERCIAL

## 2023-04-18 NOTE — TELEPHONE ENCOUNTER
----- Message from Ashtyn Sanford PA-C sent at 4/17/2023  3:59 PM CDT -----  Regarding: schedule appt  Schedule routine visit with Dr. Sargent.     Thanks!

## 2023-04-19 ENCOUNTER — PATIENT MESSAGE (OUTPATIENT)
Dept: FAMILY MEDICINE | Facility: CLINIC | Age: 43
End: 2023-04-19
Payer: COMMERCIAL

## 2023-04-19 ENCOUNTER — PATIENT MESSAGE (OUTPATIENT)
Dept: RHEUMATOLOGY | Facility: CLINIC | Age: 43
End: 2023-04-19
Payer: COMMERCIAL

## 2023-04-21 ENCOUNTER — PATIENT MESSAGE (OUTPATIENT)
Dept: INTERNAL MEDICINE | Facility: CLINIC | Age: 43
End: 2023-04-21

## 2023-04-21 ENCOUNTER — PATIENT MESSAGE (OUTPATIENT)
Dept: FAMILY MEDICINE | Facility: CLINIC | Age: 43
End: 2023-04-21
Payer: COMMERCIAL

## 2023-04-21 ENCOUNTER — OFFICE VISIT (OUTPATIENT)
Dept: INTERNAL MEDICINE | Facility: CLINIC | Age: 43
End: 2023-04-21
Payer: COMMERCIAL

## 2023-04-21 ENCOUNTER — HOSPITAL ENCOUNTER (OUTPATIENT)
Dept: RADIOLOGY | Facility: HOSPITAL | Age: 43
Discharge: HOME | End: 2023-04-21
Attending: PHYSICIAN ASSISTANT
Payer: COMMERCIAL

## 2023-04-21 DIAGNOSIS — B96.89 BACTERIAL RESPIRATORY INFECTION: ICD-10-CM

## 2023-04-21 DIAGNOSIS — J18.9 PNEUMONIA OF RIGHT MIDDLE LOBE DUE TO INFECTIOUS ORGANISM: Primary | ICD-10-CM

## 2023-04-21 DIAGNOSIS — J98.8 BACTERIAL RESPIRATORY INFECTION: ICD-10-CM

## 2023-04-21 DIAGNOSIS — H10.33 ACUTE BACTERIAL CONJUNCTIVITIS OF BOTH EYES: ICD-10-CM

## 2023-04-21 PROCEDURE — 1160F RVW MEDS BY RX/DR IN RCRD: CPT | Mod: CPTII,95,, | Performed by: PHYSICIAN ASSISTANT

## 2023-04-21 PROCEDURE — 1159F MED LIST DOCD IN RCRD: CPT | Mod: CPTII,95,, | Performed by: PHYSICIAN ASSISTANT

## 2023-04-21 PROCEDURE — 1160F PR REVIEW ALL MEDS BY PRESCRIBER/CLIN PHARMACIST DOCUMENTED: ICD-10-PCS | Mod: CPTII,95,, | Performed by: PHYSICIAN ASSISTANT

## 2023-04-21 PROCEDURE — 99214 PR OFFICE/OUTPT VISIT, EST, LEVL IV, 30-39 MIN: ICD-10-PCS | Mod: 95,,, | Performed by: PHYSICIAN ASSISTANT

## 2023-04-21 PROCEDURE — 71046 X-RAY EXAM CHEST 2 VIEWS: CPT | Mod: TC,FY,PO

## 2023-04-21 PROCEDURE — 71046 XR CHEST PA AND LATERAL: ICD-10-PCS | Mod: 26,,, | Performed by: RADIOLOGY

## 2023-04-21 PROCEDURE — 99214 OFFICE O/P EST MOD 30 MIN: CPT | Mod: 95,,, | Performed by: PHYSICIAN ASSISTANT

## 2023-04-21 PROCEDURE — 71046 X-RAY EXAM CHEST 2 VIEWS: CPT | Mod: 26,,, | Performed by: RADIOLOGY

## 2023-04-21 PROCEDURE — 1159F PR MEDICATION LIST DOCUMENTED IN MEDICAL RECORD: ICD-10-PCS | Mod: CPTII,95,, | Performed by: PHYSICIAN ASSISTANT

## 2023-04-21 RX ORDER — AZELASTINE 1 MG/ML
1 SPRAY, METERED NASAL 2 TIMES DAILY
Qty: 30 ML | Refills: 0 | Status: SHIPPED | OUTPATIENT
Start: 2023-04-21

## 2023-04-21 RX ORDER — ALBUTEROL SULFATE 90 UG/1
2 AEROSOL, METERED RESPIRATORY (INHALATION) EVERY 4 HOURS PRN
Qty: 18 G | Refills: 0 | Status: SHIPPED | OUTPATIENT
Start: 2023-04-21 | End: 2023-05-04

## 2023-04-21 RX ORDER — MONTELUKAST SODIUM 10 MG/1
10 TABLET ORAL NIGHTLY
Qty: 30 TABLET | Refills: 0 | Status: SHIPPED | OUTPATIENT
Start: 2023-04-21 | End: 2023-05-18

## 2023-04-21 RX ORDER — LEVOFLOXACIN 750 MG/1
750 TABLET ORAL DAILY
Qty: 5 TABLET | Refills: 0 | Status: SHIPPED | OUTPATIENT
Start: 2023-04-21 | End: 2023-04-26

## 2023-04-21 RX ORDER — PROMETHAZINE HYDROCHLORIDE AND DEXTROMETHORPHAN HYDROBROMIDE 6.25; 15 MG/5ML; MG/5ML
5 SYRUP ORAL EVERY 6 HOURS PRN
Qty: 118 ML | Refills: 0 | Status: SHIPPED | OUTPATIENT
Start: 2023-04-21

## 2023-04-21 RX ORDER — POLYMYXIN B SULFATE AND TRIMETHOPRIM 1; 10000 MG/ML; [USP'U]/ML
1 SOLUTION OPHTHALMIC EVERY 6 HOURS
Qty: 10 ML | Refills: 0 | Status: SHIPPED | OUTPATIENT
Start: 2023-04-21 | End: 2023-04-26

## 2023-04-21 NOTE — PROGRESS NOTES
Subjective:       Patient ID: Shamika Young is a 42 y.o. female.    Chief Complaint: Cough      The patient location is: [place]  The chief complaint leading to consultation is: cough    Visit type: audiovisual    Face to Face time with patient: 9 minutes (chart review started 1419; video started 1419; video ended 1428)  12 minutes of total time spent on the encounter, which includes face to face time and non-face to face time preparing to see the patient (eg, review of tests), Obtaining and/or reviewing separately obtained history, Documenting clinical information in the electronic or other health record, Independently interpreting results (not separately reported) and communicating results to the patient/family/caregiver, or Care coordination (not separately reported).     Each patient to whom he or she provides medical services by telemedicine is:  (1) informed of the relationship between the physician and patient and the respective role of any other health care provider with respect to management of the patient; and (2) notified that he or she may decline to receive medical services by telemedicine and may withdraw from such care at any time.    Cough  This is a new problem. The current episode started 1 to 4 weeks ago. The problem has been gradually improving. The problem occurs constantly. The cough is Non-productive. Associated symptoms include chest pain, headaches, nasal congestion, postnasal drip, rhinorrhea, a sore throat, shortness of breath and sweats. Pertinent negatives include no chills, ear pain, fever or wheezing. Nothing aggravates the symptoms. She has tried OTC cough suppressant for the symptoms. The treatment provided no relief. Her past medical history is significant for asthma. There is no history of COPD or pneumonia.   Shortness of Breath  This is a new problem. The current episode started today. The problem occurs constantly. The problem has been rapidly worsening. The average episode  lasts 20 minutes. Associated symptoms include chest pain, coryza, headaches, rhinorrhea, a sore throat and sputum production. Pertinent negatives include no claudication, ear pain, fever or wheezing. The symptoms are aggravated by URIs. The patient has no known risk factors for DVT/PE. She has tried body position changes, cool air, oral steroids and rest for the symptoms. The treatment provided no relief. Her past medical history is significant for asthma and bronchiolitis. There is no history of allergies, aspirin allergies, CAD, chronic lung disease, COPD, DVT, a heart failure, PE, pneumonia or a recent surgery.   Review of Systems   Constitutional:  Negative for chills and fever.   HENT:  Positive for postnasal drip, rhinorrhea and sore throat. Negative for ear pain.    Respiratory:  Positive for cough, sputum production and shortness of breath. Negative for wheezing.    Cardiovascular:  Positive for chest pain. Negative for claudication.   Neurological:  Positive for headaches.       Objective:        Wt Readings from Last 3 Encounters:   03/20/23 62.5 kg (137 lb 10.8 oz)   03/13/23 63.5 kg (139 lb 15.9 oz)   02/27/23 63.6 kg (140 lb 3.4 oz)     Temp Readings from Last 3 Encounters:   04/17/23 98.7 °F (37.1 °C) (Oral)   03/13/23 98.4 °F (36.9 °C)   02/27/23 98.4 °F (36.9 °C)     BP Readings from Last 3 Encounters:   04/17/23 120/62   03/20/23 (!) 133/92   03/13/23 125/79     Pulse Readings from Last 3 Encounters:   04/17/23 110   03/20/23 (!) 111   03/13/23 107     There is no height or weight on file to calculate BMI.    Physical Exam  Vitals and nursing note reviewed.   Constitutional:       General: She is not in acute distress.     Appearance: She is well-developed.   HENT:      Head: Normocephalic and atraumatic.   Eyes:      General: Lids are normal. No scleral icterus.     Extraocular Movements: Extraocular movements intact.      Conjunctiva/sclera: Conjunctivae normal.   Pulmonary:      Effort: Pulmonary  effort is normal.   Neurological:      Mental Status: She is alert.   Psychiatric:         Mood and Affect: Mood and affect normal.       Assessment:       1. Pneumonia of right middle lobe due to infectious organism    2. Bacterial respiratory infection    3. Acute bacterial conjunctivitis of both eyes        Plan:   1. Pneumonia of right middle lobe due to infectious organism    2. Bacterial respiratory infection  -     X-Ray Chest PA And Lateral; Future; Expected date: 04/21/2023  -     levoFLOXacin (LEVAQUIN) 750 MG tablet; Take 1 tablet (750 mg total) by mouth once daily. for 5 days  Dispense: 5 tablet; Refill: 0    3. Acute bacterial conjunctivitis of both eyes  -     polymyxin B sulf-trimethoprim (POLYTRIM) 10,000 unit- 1 mg/mL Drop; Place 1 drop into both eyes every 6 (six) hours. for 5 days  Dispense: 10 mL; Refill: 0    Other orders  -     albuterol (VENTOLIN HFA) 90 mcg/actuation inhaler; Inhale 2 puffs into the lungs every 4 (four) hours as needed for Wheezing or Shortness of Breath.  Dispense: 18 g; Refill: 0  -     promethazine-dextromethorphan (PROMETHAZINE-DM) 6.25-15 mg/5 mL Syrp; Take 5 mLs by mouth every 6 (six) hours as needed (cough).  Dispense: 118 mL; Refill: 0  -     azelastine (ASTELIN) 137 mcg (0.1 %) nasal spray; 1 spray (137 mcg total) by Nasal route 2 (two) times daily.  Dispense: 30 mL; Refill: 0  -     montelukast (SINGULAIR) 10 mg tablet; Take 1 tablet (10 mg total) by mouth every evening.  Dispense: 30 tablet; Refill: 0      ED precautions given    X-Ray Chest PA And Lateral  Narrative: EXAMINATION:  XR CHEST PA AND LATERAL    CLINICAL HISTORY:  Other specified respiratory disorders    TECHNIQUE:  PA and lateral views of the chest were performed.    COMPARISON:  None    FINDINGS:  The cardiomediastinal silhouette is normal in appearance.  No pulmonary vascular congestion appreciated. There is faint ground-glass airspace opacity within the right lower lung zone within the lateral  segment of the right middle lobe suggesting probable right middle lobe pneumonitis.  No significant volume of pleural fluid or pneumothorax. The bones are unremarkable for age.  Impression: Radiographic findings which suggest right middle lobe pneumonia.  Please correlate clinically.    This report was flagged in Epic as abnormal.    Electronically signed by: Jonathan Moran MD  Date:    04/21/2023  Time:    15:36

## 2023-04-26 ENCOUNTER — PATIENT OUTREACH (OUTPATIENT)
Dept: ADMINISTRATIVE | Facility: HOSPITAL | Age: 43
End: 2023-04-26
Payer: COMMERCIAL

## 2023-04-26 ENCOUNTER — PATIENT MESSAGE (OUTPATIENT)
Dept: ADMINISTRATIVE | Facility: HOSPITAL | Age: 43
End: 2023-04-26
Payer: COMMERCIAL

## 2023-04-26 NOTE — PROGRESS NOTES
[facility] Non-compliant report chart audits for CERVICAL CANCER SCREENING.     Chart review completed for  test overdue (mammograms, Colonoscopies, pap smears, DM labs, and/or EYE EXAMs)      Care Everywhere and media, updates requested and reviewed.      [facility] and [facility] reviewed.      Outreach to patient in reference to cervical cancer screening.      Outreach:  Cervical cancer screening

## 2023-05-02 ENCOUNTER — OFFICE VISIT (OUTPATIENT)
Dept: NEUROLOGY | Facility: CLINIC | Age: 43
End: 2023-05-02
Payer: COMMERCIAL

## 2023-05-02 ENCOUNTER — OFFICE VISIT (OUTPATIENT)
Dept: FAMILY MEDICINE | Facility: CLINIC | Age: 43
End: 2023-05-02
Payer: COMMERCIAL

## 2023-05-02 VITALS
SYSTOLIC BLOOD PRESSURE: 118 MMHG | RESPIRATION RATE: 18 BRPM | BODY MASS INDEX: 21.38 KG/M2 | WEIGHT: 133.06 LBS | HEIGHT: 66 IN | DIASTOLIC BLOOD PRESSURE: 76 MMHG

## 2023-05-02 DIAGNOSIS — J18.9 PNEUMONIA OF RIGHT MIDDLE LOBE DUE TO INFECTIOUS ORGANISM: ICD-10-CM

## 2023-05-02 DIAGNOSIS — Z00.00 PREVENTATIVE HEALTH CARE: Primary | ICD-10-CM

## 2023-05-02 DIAGNOSIS — G43.709 CHRONIC MIGRAINE WITHOUT AURA WITHOUT STATUS MIGRAINOSUS, NOT INTRACTABLE: Primary | ICD-10-CM

## 2023-05-02 PROCEDURE — 99999 PR PBB SHADOW E&M-EST. PATIENT-LVL IV: CPT | Mod: PBBFAC,,, | Performed by: INTERNAL MEDICINE

## 2023-05-02 PROCEDURE — 99213 PR OFFICE/OUTPT VISIT, EST, LEVL III, 20-29 MIN: ICD-10-PCS | Mod: 95,,, | Performed by: PHYSICIAN ASSISTANT

## 2023-05-02 PROCEDURE — 88175 CYTOPATH C/V AUTO FLUID REDO: CPT | Performed by: INTERNAL MEDICINE

## 2023-05-02 PROCEDURE — 1159F PR MEDICATION LIST DOCUMENTED IN MEDICAL RECORD: ICD-10-PCS | Mod: CPTII,95,, | Performed by: PHYSICIAN ASSISTANT

## 2023-05-02 PROCEDURE — 90471 PNEUMOCOCCAL CONJUGATE VACCINE 20-VALENT: ICD-10-PCS | Mod: S$GLB,,, | Performed by: INTERNAL MEDICINE

## 2023-05-02 PROCEDURE — 99396 PR PREVENTIVE VISIT,EST,40-64: ICD-10-PCS | Mod: 25,S$GLB,, | Performed by: INTERNAL MEDICINE

## 2023-05-02 PROCEDURE — 1160F RVW MEDS BY RX/DR IN RCRD: CPT | Mod: CPTII,S$GLB,, | Performed by: INTERNAL MEDICINE

## 2023-05-02 PROCEDURE — 1159F MED LIST DOCD IN RCRD: CPT | Mod: CPTII,95,, | Performed by: PHYSICIAN ASSISTANT

## 2023-05-02 PROCEDURE — 1159F MED LIST DOCD IN RCRD: CPT | Mod: CPTII,S$GLB,, | Performed by: INTERNAL MEDICINE

## 2023-05-02 PROCEDURE — 99213 OFFICE O/P EST LOW 20 MIN: CPT | Mod: 95,,, | Performed by: PHYSICIAN ASSISTANT

## 2023-05-02 PROCEDURE — 3008F PR BODY MASS INDEX (BMI) DOCUMENTED: ICD-10-PCS | Mod: CPTII,S$GLB,, | Performed by: INTERNAL MEDICINE

## 2023-05-02 PROCEDURE — 1160F RVW MEDS BY RX/DR IN RCRD: CPT | Mod: CPTII,95,, | Performed by: PHYSICIAN ASSISTANT

## 2023-05-02 PROCEDURE — 90471 IMMUNIZATION ADMIN: CPT | Mod: S$GLB,,, | Performed by: INTERNAL MEDICINE

## 2023-05-02 PROCEDURE — 99999 PR PBB SHADOW E&M-EST. PATIENT-LVL IV: ICD-10-PCS | Mod: PBBFAC,,, | Performed by: INTERNAL MEDICINE

## 2023-05-02 PROCEDURE — 87624 HPV HI-RISK TYP POOLED RSLT: CPT | Performed by: INTERNAL MEDICINE

## 2023-05-02 PROCEDURE — 90677 PCV20 VACCINE IM: CPT | Mod: S$GLB,,, | Performed by: INTERNAL MEDICINE

## 2023-05-02 PROCEDURE — 3074F PR MOST RECENT SYSTOLIC BLOOD PRESSURE < 130 MM HG: ICD-10-PCS | Mod: CPTII,S$GLB,, | Performed by: INTERNAL MEDICINE

## 2023-05-02 PROCEDURE — 3008F BODY MASS INDEX DOCD: CPT | Mod: CPTII,S$GLB,, | Performed by: INTERNAL MEDICINE

## 2023-05-02 PROCEDURE — 3078F PR MOST RECENT DIASTOLIC BLOOD PRESSURE < 80 MM HG: ICD-10-PCS | Mod: CPTII,S$GLB,, | Performed by: INTERNAL MEDICINE

## 2023-05-02 PROCEDURE — 1160F PR REVIEW ALL MEDS BY PRESCRIBER/CLIN PHARMACIST DOCUMENTED: ICD-10-PCS | Mod: CPTII,S$GLB,, | Performed by: INTERNAL MEDICINE

## 2023-05-02 PROCEDURE — 1160F PR REVIEW ALL MEDS BY PRESCRIBER/CLIN PHARMACIST DOCUMENTED: ICD-10-PCS | Mod: CPTII,95,, | Performed by: PHYSICIAN ASSISTANT

## 2023-05-02 PROCEDURE — 1159F PR MEDICATION LIST DOCUMENTED IN MEDICAL RECORD: ICD-10-PCS | Mod: CPTII,S$GLB,, | Performed by: INTERNAL MEDICINE

## 2023-05-02 PROCEDURE — 3074F SYST BP LT 130 MM HG: CPT | Mod: CPTII,S$GLB,, | Performed by: INTERNAL MEDICINE

## 2023-05-02 PROCEDURE — 90677 PNEUMOCOCCAL CONJUGATE VACCINE 20-VALENT: ICD-10-PCS | Mod: S$GLB,,, | Performed by: INTERNAL MEDICINE

## 2023-05-02 PROCEDURE — 3078F DIAST BP <80 MM HG: CPT | Mod: CPTII,S$GLB,, | Performed by: INTERNAL MEDICINE

## 2023-05-02 PROCEDURE — 99396 PREV VISIT EST AGE 40-64: CPT | Mod: 25,S$GLB,, | Performed by: INTERNAL MEDICINE

## 2023-05-02 NOTE — PROGRESS NOTES
[facility]  [address] [place] [address]  Phone:[phone]  Fax: [phone]   Follow up visit -telemed    Provider Location: Work         Name of Location: [facility]  City: [place]   State: [place]  Medium to connect: Video  Patient Location: home  Name of Location:   home                                   City: [place]                                                              State: [place]                                         Consent for Electronic Treatment:   This visit was conducted with the use of an interactive audio and/or video telecommunications system that permits real-time communication between the patient and this provider. The patient has submitted their consent to be treated electronically by way of this telephone and/or video technology.  The risks and limitations of the process of telemedicine have been conveyed verbally during this encounter.Each patient to whom he or she provides medical services by telemedicine is:  (1) informed of the relationship between the physician and patient and the respective role of any other health care provider with respect to management of the patient; and (2) notified that he or she may decline to receive medical services by telemedicine and may withdraw from such care at any time.    Face to Face time with patient:   19 minutes of total time spent on the encounter, which includes face to face time and non-face to face time preparing to see the patient (eg, review of tests), Obtaining and/or reviewing separately obtained history, Documenting clinical information in the electronic or other health record, Independently interpreting results (not separately reported) and communicating results to the patient/family/caregiver, or Care coordination (not separately reported).       Patient Name: Shamika Young  : 1980  MRN:  [id number]  Today: 2023   LOV: 2022  chief complaint:  Headache      PCP: Julia Sargent MD.    Assessment:   Shamika Young is a 42 y.o. 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  DUFFY now appearing episodic migrainous, better since starting qulipta.     Review:    ICD-10-CM ICD-9-CM   1. Chronic migraine without aura without status migrainosus, not intractable  G43.709 346.70               Plan:        For HA Prevention:  1)  continue qulipta   2) PTas previously ordered   3)stop lyrica   4) mood medications per other providers      For HA :  Has nurtec, may need to consider ubrelvy        To break up Headaches:  Can consider nerve blocks again        Other:  N/A           All test results as well as any necessary instructions were reviewed and discussed with patient.    Review:               Patient to return to PCP/other specialists for all other problems  Patient to continue on all medications as Rx'd  Full office note available online   RTO-6 months to check in   The patient indicates understanding of these issues and agrees to the plan.    HPI:   Shamika Young is a 42 y.o. 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     At last visit: stopped emgality, qulipta started instead. PT ordered, lyrica 25 mg BID ordered. Mood meds per other providers. Nurtec available. I also referred to back and spine.   + constipation with qulipta but  ultimately feeling better and doesn't want to make changes .   3HD/30   Getting some menstrual migraine   Hasn't had to take nurtec  Self stopped lyrica   Going to chiropractor and getting massage   She is pending PT   Did not want to make any changes  Back again starts in back of head and radiates to around the eyes       From previous visits::emgality  "continued, mood medicines per other providers, nurtec for HA  and GONB/TPI given.   3-4 HD a week in past month, historically was a lot less, no trigger idenfitied   Pain starts in back of the head and radiates around to the eyes   Has had some weakness in her right hand has noticed some discomfort in her neck  Has been off emgality for a few months, "I couldn't remember to take it"  Using tramadol daily for past month d/t neck pain  Feels the pain is making her "turn her head to the left"  No dysphagia  No incontinence  No falls  No recent trauma   Has weakness in the arm on right though pain more in LEFT neck/occiput        From previous visit: had emgality, nurtec and zanaflex.  10 HD a month  Had mild-moderate HA now  Pain in occipitalis and trapezius  + photophobia  Zanaflex not helping/stopped taking it  Nurtec does help  Emgality is helping   Chocolate and menses are a trigger   "I really have benefit from TPI, can I please have one?"     From previous visits:wrote for emgality and nurtec.   HA weather related (?) and sleep deprivation "I stay up late with my kids watching television"  Emgality helping   Stopped taking BC  Pain along frontalis  nurtec is hit/miss  8 HD in past month  Severity: 4-8/10  HA last: 12-15 hours     HA HPI:  Start:Since HS, took triptans, and seemed to aide, and in  returned  (frequency), started new BC about a month and noticed frequency intensified   History of trauma (no), History of CNS infection (no), History of Stroke (no)  Location:frontalis and retroorbital and more to the top of the head, occasionally down the cheek (right)  Severity: range: 3-6/10  Duration:8-12 hours in a day   Frequency:12-15 HD a month   Associated factors (bolded positive) WITH HA  (or migraine): Nausea, vomiting, photophobia, phonophobia, tinnitus, scalp pain, vision loss, diplopia, scintillations, eye pain, jaw pain, weakness?    Tried:maxalt, no preventative   Triggers (in bold): " stress, lack of sleep, too much caffeine(soda and coffee), too little caffeine, weather change, seasonal change, strong odours, bright lights, sunlight, food  Last HA: yesterday  Positives in bold: Hx of Kidney Stones, asthma (as a child), GI bleed, osteoporosis, CAD/MI, CVA/TIA, DM    Imaging on file: none   Therapies tried in past: (failures to be marked, if known---why did it fail?)  vyvanse  wellbutrin  maxalt  Prednisone   buspar  zofran  imitrex  depakote  Tizandine  Gabapentin -didn't tolerate  Lyrica  Qulipta  GONB/TPI  Nurtec         Medication Reconciliation:   Current Outpatient Medications   Medication Sig Dispense Refill    albuterol (VENTOLIN HFA) 90 mcg/actuation inhaler Inhale 2 puffs into the lungs every 4 (four) hours as needed for Wheezing or Shortness of Breath. 18 g 0    atogepant (QULIPTA) 60 mg Tab Take 1 tablet by mouth daily 30 tablet 6    azelastine (ASTELIN) 137 mcg (0.1 %) nasal spray 1 spray (137 mcg total) by Nasal route 2 (two) times daily. 30 mL 0    buPROPion (WELLBUTRIN) 75 MG tablet Take 75 mg by mouth once daily.      busPIRone (BUSPAR) 10 MG tablet Take 10 mg by mouth 3 (three) times daily.      cevimeline (EVOXAC) 30 mg capsule Take 1 capsule (30 mg total) by mouth 3 (three) times daily. 270 capsule 3    CHOLINE ORAL Take by mouth.      ergocalciferol, vitamin D2, (VITAMIN D ORAL) Take by mouth.      hydrOXYchloroQUINE (PLAQUENIL) 200 mg tablet Take 1 tablet (200 mg total) by mouth 2 (two) times a day. 60 tablet 11    Lactobac no.41/Bifidobact no.7 (PROBIOTIC-10 ORAL) Take by mouth. bromoline      LORazepam (ATIVAN) 1 MG tablet Take 1 mg by mouth daily as needed.      methylphenidate HCl (RITALIN) 20 MG tablet Take 20 mg by mouth once daily.      montelukast (SINGULAIR) 10 mg tablet Take 1 tablet (10 mg total) by mouth every evening. 30 tablet 0    NURTEC 75 mg odt Take 75 mg by mouth once as needed.      promethazine-dextromethorphan (PROMETHAZINE-DM) 6.25-15 mg/5 mL Syrp Take  5 mLs by mouth every 6 (six) hours as needed (cough). 118 mL 0    thymol/chlorophyllin (CHLOROPHYLL ORAL) Take by mouth.      traMADoL (ULTRAM) 50 mg tablet Take 1 tablet (50 mg total) by mouth every 8 (eight) hours as needed for Pain (joint pains dx Primary Sjogrens with vasculitis.). 40 tablet 3    TYROSINE ORAL Take by mouth.      VYVANSE 30 mg capsule Take 30 mg by mouth nightly.       No current facility-administered medications for this visit.     Review of patient's allergies indicates:   Allergen Reactions    Tree nut Anaphylaxis    Tree nuts Anaphylaxis    Biaxin [clarithromycin] Other (See Comments)     GI symptoms     Erythromycin      Unknown    Azithromycin Rash    Ceclor [cefaclor] Rash    Ciprofloxacin Rash    Penicillins Rash    Tetracyclines Hives       PMHx:  Past Medical History:   Diagnosis Date    Allergy     previously on immunotherapy    Anxiety     Depression     Headache     Lumbago with sciatica, right side     Lumbar disc disease     L4-L5    Other chronic pain     Panic disorder     Positive CHELSEA (antinuclear antibody)     Purpura     Sjogren's syndrome     Vasculitis      Past Surgical History:   Procedure Laterality Date    CYST REMOVAL  2012    mandibular    HYSTEROSCOPY N/A 7/17/2020    Procedure: HYSTEROSCOPY/ with IUD removal;  Surgeon: Cherry Christine MD;  Location: [facility];  Service: OB/GYN;  Laterality: N/A;    INTRAUTERINE DEVICE INSERTION  7/17/2020    Procedure: INSERTION, INTRAUTERINE DEVICE;  Surgeon: Cherry Christine MD;  Location: [facility];  Service: OB/GYN;;    lumbar spine procedure      2004/2014    REMOVAL OF INTRAUTERINE DEVICE (IUD)  7/17/2020    Procedure: REMOVAL, INTRAUTERINE DEVICE;  Surgeon: Cherry Christine MD;  Location: [facility];  Service: OB/GYN;;    TONSILLECTOMY AND ADENOIDECTOMY      7th grade       Fhx:  Family History   Problem Relation Age of Onset    Multiple sclerosis Mother     Heart disease Mother     Diabetes Mother     Hypertension Mother      Heart disease Father     Hyperlipidemia Father     Glaucoma Neg Hx     Retinal detachment Neg Hx     Macular degeneration Neg Hx        Shx:   Social History     Socioeconomic History    Marital status:    Tobacco Use    Smoking status: Former    Smokeless tobacco: Never   Substance and Sexual Activity    Alcohol use: Yes     Comment: socially    Drug use: Never           Labs:   Reviewed in chart     Imaging:   Reviewed in chart       Other testing:  Reviewed in chart     Note: I have independently reviewed any/all imaging/labs/tests and agree with the report (s) as documented.  Any discrepancies will be as noted/demarcated by free text.  AUGIE MG 5/2/2023                     ROS:   Review Of Systems (questions asked, positive or additions in BOLD)  Gen: Weight change, fatigue/malaise, pyrexia   HEENT: Tinnitus, headache,  blurred vision, eye pain, diplopia, photophobia,  nose bleeds, congestion, sore throat, jaw pain, scalp pain, neck stiffness   Card: Palpitations, CP   Pulm: SOB, cough   Vas: Easy bruising, easy bleeding   GI: N/V/D/C, incontinence, hematemesis, hematochezia    : incontinence, hematuria   Endocrine: Temp intolerance, polyuria, polydipsia   M/S: Neck pain, myalgia, back pain, joint pain, falls    Neuro: PER HPI   PSY: Memory loss, confusion, depression, anxiety, trouble in sleep          EXAM:      There were no vitals taken for this visit. <----none taken as this was a virtual visit   GEN:  NAD  HEENT: NC/AT   EXTREM:    no edema present.    NEURO:  Mental Status:  Awake, alert and appropriately oriented to time, place, and person.  Normal attention and concentration.  Speech is fluent and appropriate language function (I.e., comprehension, repetition, etc).     Cranial Nerves:    Extraocular movements are intact and without nystagmus.      Facial movement is symmetric.   Hearing appears intact.  Uvula in midline. DROM of neck in all (6) directions, shoulder shrug symmetrical. Tongue in  midline without fasiculation.     Motor:  antitgravity bilat UE   Tremor/pronator drift not apparent.      Gait and Stance: sit to stand without assistance         This document has been electronically signed by Mr. Eduar Serra MPA, PA-C on 5/2/2023, I have personally typed this message using the EMR.       Dr Daniel MD was available during today's visit.          CC: Julia Sargent MD

## 2023-05-02 NOTE — PROGRESS NOTES
Assessment and Plan:    1. Preventative health care  Discussed age appropriate preventative healthcare items such as cancer screenings and recommended immunizations. Discussed whether patient is using tobacco, alcohol, or illicit drugs and any concerns were discussed. Discussed maintenance of a healthy weight. Patient queried if she has any additional questions about preventative healthcare and all questions were answered.  Reviewed mammogram screening recommendations from different societies. Declines at this time. Breast exam normal and no family history.  - Liquid-Based Pap Smear, Screening  - HPV High Risk Genotypes, PCR  - (In Office Administered) Pneumococcal Conjugate Vaccine (20 Valent) (IM)    2. Pneumonia of right middle lobe due to infectious organism  Repeat CXR 6 weeks after abnormal to ensure resolution.  - X-Ray Chest PA And Lateral; Future      ______________________________________________________________________  Subjective:    Chief Complaint:  Annual    HPI:  Shamika is a 42 y.o. year old female here for annual well woman exam.     She reports that she has had one abnormal Pap years ago, but was normal on subsequent testing and never had any biopsy that she can recall. 1 prior vaginal delivery.     She has no family history of breast cancer and reports she is not sure about starting mammograms yet. She had been reading about the possibility of false positives in premenopausal women.     She is sexually active with one partner () and does not feel that she is at particular risk for HIV so declines this screening today as she would not be due for any other labs at this time.     She had RML PNA 2 weeks ago. Treated with levaquin and she notes that all symptoms resolved with this.     Medications:  Current Outpatient Medications on File Prior to Visit   Medication Sig Dispense Refill    albuterol (VENTOLIN HFA) 90 mcg/actuation inhaler Inhale 2 puffs into the lungs every 4 (four) hours as  needed for Wheezing or Shortness of Breath. 18 g 0    atogepant (QULIPTA) 60 mg Tab Take 1 tablet by mouth daily 30 tablet 6    azelastine (ASTELIN) 137 mcg (0.1 %) nasal spray 1 spray (137 mcg total) by Nasal route 2 (two) times daily. 30 mL 0    buPROPion (WELLBUTRIN) 75 MG tablet Take 75 mg by mouth once daily.      busPIRone (BUSPAR) 10 MG tablet Take 10 mg by mouth 3 (three) times daily.      cevimeline (EVOXAC) 30 mg capsule Take 1 capsule (30 mg total) by mouth 3 (three) times daily. 270 capsule 3    CHOLINE ORAL Take by mouth.      ergocalciferol, vitamin D2, (VITAMIN D ORAL) Take by mouth.      hydrOXYchloroQUINE (PLAQUENIL) 200 mg tablet Take 1 tablet (200 mg total) by mouth 2 (two) times a day. 60 tablet 11    Lactobac no.41/Bifidobact no.7 (PROBIOTIC-10 ORAL) Take by mouth. bromoline      LORazepam (ATIVAN) 1 MG tablet Take 1 mg by mouth daily as needed.      methylphenidate HCl (RITALIN) 20 MG tablet Take 20 mg by mouth once daily.      montelukast (SINGULAIR) 10 mg tablet Take 1 tablet (10 mg total) by mouth every evening. 30 tablet 0    NURTEC 75 mg odt Take 75 mg by mouth once as needed.      promethazine-dextromethorphan (PROMETHAZINE-DM) 6.25-15 mg/5 mL Syrp Take 5 mLs by mouth every 6 (six) hours as needed (cough). 118 mL 0    thymol/chlorophyllin (CHLOROPHYLL ORAL) Take by mouth.      traMADoL (ULTRAM) 50 mg tablet Take 1 tablet (50 mg total) by mouth every 8 (eight) hours as needed for Pain (joint pains dx Primary Sjogrens with vasculitis.). 40 tablet 3    TYROSINE ORAL Take by mouth.      VYVANSE 30 mg capsule Take 30 mg by mouth nightly.       No current facility-administered medications on file prior to visit.       Review of Systems:  Review of Systems   Constitutional:  Negative for chills and fever.   Respiratory:  Negative for cough and shortness of breath.    Genitourinary:  Negative for genital sores and menstrual problem.     Past Medical History:  Past Medical History:   Diagnosis  "Date    Allergy     previously on immunotherapy    Anxiety     Depression     Headache     Lumbago with sciatica, right side     Lumbar disc disease     L4-L5    Other chronic pain     Panic disorder     Positive CHELSEA (antinuclear antibody)     Purpura     Sjogren's syndrome     Vasculitis        Objective:    Vitals:  Vitals:    05/02/23 1536   BP: 118/76   Resp: 18   Weight: 60.3 kg (133 lb 0.8 oz)   Height: 5' 6" (1.676 m)       Physical Exam  Vitals reviewed. Exam conducted with a chaperone present.   Constitutional:       General: She is not in acute distress.     Appearance: She is well-developed.   Eyes:      General:         Right eye: No discharge.         Left eye: No discharge.      Conjunctiva/sclera: Conjunctivae normal.   Cardiovascular:      Rate and Rhythm: Normal rate and regular rhythm.   Pulmonary:      Effort: Pulmonary effort is normal. No respiratory distress.      Breath sounds: No rales.   Chest:   Breasts:     Breasts are symmetrical.      Right: No inverted nipple, mass, nipple discharge, skin change or tenderness.      Left: No inverted nipple, mass, nipple discharge, skin change or tenderness.   Genitourinary:     Labia:         Right: No rash or lesion.         Left: No rash or lesion.       Vagina: Normal. No erythema.      Cervix: No cervical motion tenderness, discharge or friability.      Adnexa:         Right: No mass, tenderness or fullness.          Left: No mass, tenderness or fullness.     Skin:     General: Skin is warm and dry.   Neurological:      Mental Status: She is alert and oriented to person, place, and time.   Psychiatric:         Behavior: Behavior normal.         Thought Content: Thought content normal.         Judgment: Judgment normal.       Data:  Recent CXR with RML infiltrate      Julia Sargent MD  Internal Medicine    "

## 2023-05-04 RX ORDER — ALBUTEROL SULFATE 90 UG/1
AEROSOL, METERED RESPIRATORY (INHALATION)
Qty: 18 G | Refills: 4 | Status: SHIPPED | OUTPATIENT
Start: 2023-05-04

## 2023-05-08 ENCOUNTER — OFFICE VISIT (OUTPATIENT)
Dept: PAIN MEDICINE | Facility: CLINIC | Age: 43
End: 2023-05-08
Payer: COMMERCIAL

## 2023-05-08 ENCOUNTER — LAB VISIT (OUTPATIENT)
Dept: LAB | Facility: HOSPITAL | Age: 43
End: 2023-05-08
Attending: INTERNAL MEDICINE
Payer: COMMERCIAL

## 2023-05-08 VITALS
WEIGHT: 133.06 LBS | DIASTOLIC BLOOD PRESSURE: 75 MMHG | HEART RATE: 107 BPM | HEIGHT: 66 IN | BODY MASS INDEX: 21.38 KG/M2 | SYSTOLIC BLOOD PRESSURE: 125 MMHG

## 2023-05-08 DIAGNOSIS — M54.2 CERVICALGIA: ICD-10-CM

## 2023-05-08 DIAGNOSIS — I73.00 RAYNAUD'S PHENOMENON WITHOUT GANGRENE: ICD-10-CM

## 2023-05-08 DIAGNOSIS — M50.90 CERVICAL DISC DISEASE: ICD-10-CM

## 2023-05-08 DIAGNOSIS — M54.12 CERVICAL RADICULOPATHY: ICD-10-CM

## 2023-05-08 DIAGNOSIS — M47.812 CERVICAL SPONDYLOSIS: Primary | ICD-10-CM

## 2023-05-08 DIAGNOSIS — M35.00 SJOGREN'S SYNDROME WITHOUT EXTRAGLANDULAR INVOLVEMENT: ICD-10-CM

## 2023-05-08 DIAGNOSIS — M47.812 OSTEOARTHRITIS OF CERVICAL SPINE, UNSPECIFIED SPINAL OSTEOARTHRITIS COMPLICATION STATUS: ICD-10-CM

## 2023-05-08 DIAGNOSIS — D89.1 CRYOGLOBULINEMIC VASCULITIS: ICD-10-CM

## 2023-05-08 DIAGNOSIS — Z79.899 HIGH RISK MEDICATIONS (NOT ANTICOAGULANTS) LONG-TERM USE: ICD-10-CM

## 2023-05-08 LAB
ALBUMIN SERPL BCP-MCNC: 3.6 G/DL (ref 3.5–5.2)
ALP SERPL-CCNC: 53 U/L (ref 55–135)
ALT SERPL W/O P-5'-P-CCNC: 15 U/L (ref 10–44)
ANION GAP SERPL CALC-SCNC: 5 MMOL/L (ref 8–16)
AST SERPL-CCNC: 15 U/L (ref 10–40)
BASOPHILS # BLD AUTO: 0.04 K/UL (ref 0–0.2)
BASOPHILS NFR BLD: 0.8 % (ref 0–1.9)
BILIRUB SERPL-MCNC: 0.2 MG/DL (ref 0.1–1)
BUN SERPL-MCNC: 16 MG/DL (ref 6–20)
CALCIUM SERPL-MCNC: 9.1 MG/DL (ref 8.7–10.5)
CHLORIDE SERPL-SCNC: 110 MMOL/L (ref 95–110)
CO2 SERPL-SCNC: 24 MMOL/L (ref 23–29)
CREAT SERPL-MCNC: 0.8 MG/DL (ref 0.5–1.4)
CRP SERPL-MCNC: 1 MG/L (ref 0–8.2)
DIFFERENTIAL METHOD: ABNORMAL
EOSINOPHIL # BLD AUTO: 0.2 K/UL (ref 0–0.5)
EOSINOPHIL NFR BLD: 3.6 % (ref 0–8)
ERYTHROCYTE [DISTWIDTH] IN BLOOD BY AUTOMATED COUNT: 13.6 % (ref 11.5–14.5)
ERYTHROCYTE [SEDIMENTATION RATE] IN BLOOD BY PHOTOMETRIC METHOD: 5 MM/HR (ref 0–36)
EST. GFR  (NO RACE VARIABLE): >60 ML/MIN/1.73 M^2
GLUCOSE SERPL-MCNC: 87 MG/DL (ref 70–110)
HCT VFR BLD AUTO: 36.3 % (ref 37–48.5)
HGB BLD-MCNC: 11.7 G/DL (ref 12–16)
IMM GRANULOCYTES # BLD AUTO: 0.02 K/UL (ref 0–0.04)
IMM GRANULOCYTES NFR BLD AUTO: 0.4 % (ref 0–0.5)
LYMPHOCYTES # BLD AUTO: 1.4 K/UL (ref 1–4.8)
LYMPHOCYTES NFR BLD: 28.6 % (ref 18–48)
MCH RBC QN AUTO: 30.1 PG (ref 27–31)
MCHC RBC AUTO-ENTMCNC: 32.2 G/DL (ref 32–36)
MCV RBC AUTO: 93 FL (ref 82–98)
MONOCYTES # BLD AUTO: 0.6 K/UL (ref 0.3–1)
MONOCYTES NFR BLD: 12.1 % (ref 4–15)
NEUTROPHILS # BLD AUTO: 2.8 K/UL (ref 1.8–7.7)
NEUTROPHILS NFR BLD: 54.5 % (ref 38–73)
NRBC BLD-RTO: 0 /100 WBC
PLATELET # BLD AUTO: 298 K/UL (ref 150–450)
PMV BLD AUTO: 11.4 FL (ref 9.2–12.9)
POTASSIUM SERPL-SCNC: 4 MMOL/L (ref 3.5–5.1)
PROT SERPL-MCNC: 6.9 G/DL (ref 6–8.4)
RBC # BLD AUTO: 3.89 M/UL (ref 4–5.4)
SODIUM SERPL-SCNC: 139 MMOL/L (ref 136–145)
WBC # BLD AUTO: 5.04 K/UL (ref 3.9–12.7)

## 2023-05-08 PROCEDURE — 1159F PR MEDICATION LIST DOCUMENTED IN MEDICAL RECORD: ICD-10-PCS | Mod: CPTII,S$GLB,, | Performed by: PHYSICIAN ASSISTANT

## 2023-05-08 PROCEDURE — 1160F PR REVIEW ALL MEDS BY PRESCRIBER/CLIN PHARMACIST DOCUMENTED: ICD-10-PCS | Mod: CPTII,S$GLB,, | Performed by: PHYSICIAN ASSISTANT

## 2023-05-08 PROCEDURE — 99999 PR PBB SHADOW E&M-EST. PATIENT-LVL V: CPT | Mod: PBBFAC,,, | Performed by: PHYSICIAN ASSISTANT

## 2023-05-08 PROCEDURE — 85025 COMPLETE CBC W/AUTO DIFF WBC: CPT | Performed by: INTERNAL MEDICINE

## 2023-05-08 PROCEDURE — 36415 COLL VENOUS BLD VENIPUNCTURE: CPT | Mod: PO | Performed by: INTERNAL MEDICINE

## 2023-05-08 PROCEDURE — 3008F BODY MASS INDEX DOCD: CPT | Mod: CPTII,S$GLB,, | Performed by: PHYSICIAN ASSISTANT

## 2023-05-08 PROCEDURE — 3008F PR BODY MASS INDEX (BMI) DOCUMENTED: ICD-10-PCS | Mod: CPTII,S$GLB,, | Performed by: PHYSICIAN ASSISTANT

## 2023-05-08 PROCEDURE — 3074F SYST BP LT 130 MM HG: CPT | Mod: CPTII,S$GLB,, | Performed by: PHYSICIAN ASSISTANT

## 2023-05-08 PROCEDURE — 86140 C-REACTIVE PROTEIN: CPT | Performed by: INTERNAL MEDICINE

## 2023-05-08 PROCEDURE — 85652 RBC SED RATE AUTOMATED: CPT | Performed by: INTERNAL MEDICINE

## 2023-05-08 PROCEDURE — 80053 COMPREHEN METABOLIC PANEL: CPT | Performed by: INTERNAL MEDICINE

## 2023-05-08 PROCEDURE — 3078F PR MOST RECENT DIASTOLIC BLOOD PRESSURE < 80 MM HG: ICD-10-PCS | Mod: CPTII,S$GLB,, | Performed by: PHYSICIAN ASSISTANT

## 2023-05-08 PROCEDURE — 1160F RVW MEDS BY RX/DR IN RCRD: CPT | Mod: CPTII,S$GLB,, | Performed by: PHYSICIAN ASSISTANT

## 2023-05-08 PROCEDURE — 99999 PR PBB SHADOW E&M-EST. PATIENT-LVL V: ICD-10-PCS | Mod: PBBFAC,,, | Performed by: PHYSICIAN ASSISTANT

## 2023-05-08 PROCEDURE — 3074F PR MOST RECENT SYSTOLIC BLOOD PRESSURE < 130 MM HG: ICD-10-PCS | Mod: CPTII,S$GLB,, | Performed by: PHYSICIAN ASSISTANT

## 2023-05-08 PROCEDURE — 99204 PR OFFICE/OUTPT VISIT, NEW, LEVL IV, 45-59 MIN: ICD-10-PCS | Mod: S$GLB,,, | Performed by: PHYSICIAN ASSISTANT

## 2023-05-08 PROCEDURE — 3078F DIAST BP <80 MM HG: CPT | Mod: CPTII,S$GLB,, | Performed by: PHYSICIAN ASSISTANT

## 2023-05-08 PROCEDURE — 1159F MED LIST DOCD IN RCRD: CPT | Mod: CPTII,S$GLB,, | Performed by: PHYSICIAN ASSISTANT

## 2023-05-08 PROCEDURE — 99204 OFFICE O/P NEW MOD 45 MIN: CPT | Mod: S$GLB,,, | Performed by: PHYSICIAN ASSISTANT

## 2023-05-08 RX ORDER — METHOCARBAMOL 500 MG/1
500 TABLET, FILM COATED ORAL EVERY 12 HOURS PRN
Qty: 40 TABLET | Refills: 0 | Status: SHIPPED | OUTPATIENT
Start: 2023-05-08 | End: 2023-09-15 | Stop reason: SDUPTHER

## 2023-05-08 NOTE — PROGRESS NOTES
[facility] New Patient Evaluation      Referred by: Eduar Serra    PCP:   Julia Sargent MD    CC:   Chief Complaint   Patient presents with    Neck Pain          HPI:   Shamika Young is a 42 y.o. year old female patient who has a past medical history of Allergy, Anxiety, Depression, Headache, Lumbago with sciatica, right side, Lumbar disc disease, Other chronic pain, Panic disorder, Positive CHELSEA (antinuclear antibody), Purpura, Sjogren's syndrome, and Vasculitis. She presents in referral from Eduar Serra for neck pain.  She has chronic pain in the posterior neck to the right side.  With certain movements, she does feel right biceps region pain and tingling.  Her right hand feels weaker than her left.  She has tried muscle relaxants, aleve, cbd lotion, chiropractic cre, trigger point injections, home streteching, massage.    Denies bowel/ bladder incontinence.    Past and current medications:  Antineuropathics: (lyrica in the past with no benefit)  NSAIDs:  aleve  Antidepressants:  wellbutin, buspar  Muscle relaxers: (robaxin, zanaflex, skelaxin in the past)  Opioids:  tramadol  Antiplatelets/Anticoagulants:  Others:  cbd lotion    Physical Therapy/ Chiropractic care:  PT - none  Chiropractic care - longer term and continues to go  Massage - home and professional    Pain Intervention History:  Cervical trigger point injections in 2018 or 2019 in [place], [place]    Past Spine Surgical History:  none        History:    Current Outpatient Medications:     albuterol (PROVENTIL/VENTOLIN HFA) 90 mcg/actuation inhaler, INHALE 2 PUFFS INTO THE LUNGS EVERY 4 HOURS AS NEEDED FOR WHEEZING OR SHORTNESS OF BREATH, Disp: 18 g, Rfl: 4    atogepant (QULIPTA) 60 mg Tab, Take 1 tablet by mouth daily, Disp: 30 tablet, Rfl: 6    azelastine (ASTELIN) 137 mcg (0.1 %) nasal spray, 1 spray (137 mcg total) by Nasal route 2 (two) times daily., Disp: 30 mL, Rfl: 0    buPROPion (WELLBUTRIN) 75 MG tablet, Take 75 mg by  mouth once daily., Disp: , Rfl:     busPIRone (BUSPAR) 10 MG tablet, Take 10 mg by mouth 3 (three) times daily., Disp: , Rfl:     cevimeline (EVOXAC) 30 mg capsule, Take 1 capsule (30 mg total) by mouth 3 (three) times daily., Disp: 270 capsule, Rfl: 3    CHOLINE ORAL, Take by mouth., Disp: , Rfl:     ergocalciferol, vitamin D2, (VITAMIN D ORAL), Take by mouth., Disp: , Rfl:     hydrOXYchloroQUINE (PLAQUENIL) 200 mg tablet, Take 1 tablet (200 mg total) by mouth 2 (two) times a day., Disp: 60 tablet, Rfl: 11    Lactobac no.41/Bifidobact no.7 (PROBIOTIC-10 ORAL), Take by mouth. bromoline, Disp: , Rfl:     LORazepam (ATIVAN) 1 MG tablet, Take 1 mg by mouth daily as needed., Disp: , Rfl:     methylphenidate HCl (RITALIN) 20 MG tablet, Take 20 mg by mouth once daily., Disp: , Rfl:     montelukast (SINGULAIR) 10 mg tablet, Take 1 tablet (10 mg total) by mouth every evening., Disp: 30 tablet, Rfl: 0    NURTEC 75 mg odt, Take 75 mg by mouth once as needed., Disp: , Rfl:     promethazine-dextromethorphan (PROMETHAZINE-DM) 6.25-15 mg/5 mL Syrp, Take 5 mLs by mouth every 6 (six) hours as needed (cough)., Disp: 118 mL, Rfl: 0    thymol/chlorophyllin (CHLOROPHYLL ORAL), Take by mouth., Disp: , Rfl:     traMADoL (ULTRAM) 50 mg tablet, Take 1 tablet (50 mg total) by mouth every 8 (eight) hours as needed for Pain (joint pains dx Primary Sjogrens with vasculitis.)., Disp: 40 tablet, Rfl: 3    TYROSINE ORAL, Take by mouth., Disp: , Rfl:     VYVANSE 30 mg capsule, Take 30 mg by mouth nightly., Disp: , Rfl:     Past Medical History:   Diagnosis Date    Allergy     previously on immunotherapy    Anxiety     Depression     Headache     Lumbago with sciatica, right side     Lumbar disc disease     L4-L5    Other chronic pain     Panic disorder     Positive CHELSEA (antinuclear antibody)     Purpura     Sjogren's syndrome     Vasculitis        Past Surgical History:   Procedure Laterality Date    CYST REMOVAL  2012    mandibular     "HYSTEROSCOPY N/A 7/17/2020    Procedure: HYSTEROSCOPY/ with IUD removal;  Surgeon: Cherry Christine MD;  Location: [facility];  Service: OB/GYN;  Laterality: N/A;    INTRAUTERINE DEVICE INSERTION  7/17/2020    Procedure: INSERTION, INTRAUTERINE DEVICE;  Surgeon: Cherry Christine MD;  Location: [facility];  Service: OB/GYN;;    lumbar spine procedure      2004/2014    REMOVAL OF INTRAUTERINE DEVICE (IUD)  7/17/2020    Procedure: REMOVAL, INTRAUTERINE DEVICE;  Surgeon: Cherry Christine MD;  Location: [facility];  Service: OB/GYN;;    TONSILLECTOMY AND ADENOIDECTOMY      7th grade       Family History   Problem Relation Age of Onset    Multiple sclerosis Mother     Heart disease Mother     Diabetes Mother     Hypertension Mother     Heart disease Father     Hyperlipidemia Father     Glaucoma Neg Hx     Retinal detachment Neg Hx     Macular degeneration Neg Hx        Social History     Socioeconomic History    Marital status:    Tobacco Use    Smoking status: Former    Smokeless tobacco: Never   Substance and Sexual Activity    Alcohol use: Yes     Comment: socially    Drug use: Never       Review of patient's allergies indicates:   Allergen Reactions    Tree nut Anaphylaxis    Tree nuts Anaphylaxis    Biaxin [clarithromycin] Other (See Comments)     GI symptoms     Erythromycin      Unknown    Azithromycin Rash    Ceclor [cefaclor] Rash    Ciprofloxacin Rash    Penicillins Rash    Tetracyclines Hives       Labs:  No results found for: LABA1C, HGBA1C    Lab Results   Component Value Date    WBC 4.38 02/08/2023    HGB 13.1 02/08/2023    HCT 40.8 02/08/2023    MCV 96 02/08/2023     02/08/2023           Review of Systems:  Neck pain.  Arm pain/ tinlging.  Balance of review of systems is negative.    Physical Exam:  Vitals:    05/08/23 1019   BP: 125/75   Pulse: 107   Weight: 60.3 kg (133 lb 0.8 oz)   Height: 5' 6" (1.676 m)   PainSc:   3   PainLoc: Neck     Body mass index is 21.47 kg/m².    Gen: NAD  Psych: mood " appropriate for given condition  HEENT: eyes anicteric   CV: RRR, 2+ radial pulse  HEENT: anicteric   Respiratory: non-labored, no signs of respiratory distress  Abd: non-distended  Skin: warm, dry and intact.  Gait: Able to heel walk, toe walk. No antalgic gait.     Coordination:   Romberg: negative  Finger to nose coordination: normal  Heel to shin coordination: normal  Tandem walking coordination: normal    Cervical spine: ROM is full in flexion, extension and lateral rotation without increased pain.  Spurling's maneuver causes no neck pain to either side.  Myofascial exam: No Tenderness to palpation across cervical paraspinous region bilaterally.    Lumbar spine:  Lumbar spine: ROM is full with flexion extension and oblique extension with no increased pain.    Ja's test causes no increased pain on either side.    Supine straight leg raise is negative bilaterally.    Internal and external rotation of the hip causes no increased pain on either side.  Myofascial exam: No tenderness to palpation across lumbar paraspinous muscles. No tenderness to palpation over the bilateral greater trochanters and bilateral SI joint    Sensory:  Intact and symmetrical to light touch in C4-T1 dermatomes bilaterally. Intact and symmetrical to light touch in L1-S1 dermatomes bilaterally.    Motor:    Right Left   C4 Shoulder Abduction  5  5   C5 Elbow Flexion    5  5   C6 Wrist Extension  5  5   C7 Elbow Extension   5  5   C8/T1 Hand Intrinsics   5  5        Right Left   L2/3 Iliacus Hip flexion  5  5   L3/4 Qudratus Femoris Knee Extension  5  5   L4/5 Tib Anterior Ankle Dorsiflexion   5  5   L5/S1 Extensor Hallicus Longus Great toe extension  5  5   S1/S2 Gastroc/Soleus Plantar Flexion  5  5      Right Left   Triceps DTR 2+ 2+   Biceps DTR 2+ 2+   Brachioradialis DTR 2+ 2+   Patellar DTR 2+ 2+   Achilles DTR 2+ 2+   Smyth Absent  Absent   Clonus Absent Absent   Babinski Absent Absent       Imaging:    MRI cervical spine  3-29-23:  straightening of the cervical lordosis that can be positional vs degenerative in nature.  C5/6 disk/ osteophyte complex with mild central canal narrowing and mild right ventral cord flattening, mild -moderate bilateral foraminal narrowing.  C6/7 disk/ osteophyte complex and facet arthropathy with mild to moderate central canal narrowing, moderate right, mild left foraminal narrowing.  No myelomalacia.      Assessment:   Shamika Young is a 42 y.o. year old female patient who has a past medical history of Allergy, Anxiety, Depression, Headache, Lumbago with sciatica, right side, Lumbar disc disease, Other chronic pain, Panic disorder, Positive CHELSEA (antinuclear antibody), Purpura, Sjogren's syndrome, and Vasculitis. She presents in referral from Eduar Serra for neck pain. She has cervicals spondylosis particularly C5/6, C6/7 with foraminal narrowing likely casueing neck pain with intermittent right arm C6, C7 radiculopathy.  No neurological deficits    Problem List Items Addressed This Visit    None  Visit Diagnoses       Cervicalgia        Osteoarthritis of cervical spine, unspecified spinal osteoarthritis complication status        Cervical disc disease                Plan:  - to help with symptoms discussed medications, PT, futher chiropractic care/ massage, AMAURI and surgical interventioan.  - she will continue with current plan and may proceed with PT in the near future.  She will call or discuss with chiropractor about PT referral.  Will call if wants to pursue C7/T1 AMAURI.  She is not interested in surgery at this time.  - follow up as needed      Follow Up: RTC as needed       : Not applicable    Thank you for referring this interesting patient, and I look forward to continuing to collaborate in her care.        Odalys Cid PA-C  [facility]

## 2023-05-09 LAB
HPV HR 12 DNA SPEC QL NAA+PROBE: NEGATIVE
HPV16 AG SPEC QL: NEGATIVE
HPV18 DNA SPEC QL NAA+PROBE: NEGATIVE

## 2023-05-10 LAB
FINAL PATHOLOGIC DIAGNOSIS: NORMAL
Lab: NORMAL

## 2023-05-18 RX ORDER — MONTELUKAST SODIUM 10 MG/1
TABLET ORAL
Qty: 90 TABLET | Refills: 3 | Status: SHIPPED | OUTPATIENT
Start: 2023-05-18

## 2023-06-08 ENCOUNTER — PATIENT MESSAGE (OUTPATIENT)
Dept: RHEUMATOLOGY | Facility: CLINIC | Age: 43
End: 2023-06-08
Payer: COMMERCIAL

## 2023-06-15 ENCOUNTER — OFFICE VISIT (OUTPATIENT)
Dept: RHEUMATOLOGY | Facility: CLINIC | Age: 43
End: 2023-06-15
Payer: COMMERCIAL

## 2023-06-15 DIAGNOSIS — M06.4 INFLAMMATORY POLYARTHRITIS: ICD-10-CM

## 2023-06-15 DIAGNOSIS — Z79.899 HIGH RISK MEDICATIONS (NOT ANTICOAGULANTS) LONG-TERM USE: ICD-10-CM

## 2023-06-15 DIAGNOSIS — D89.1 CRYOGLOBULINEMIC VASCULITIS: ICD-10-CM

## 2023-06-15 DIAGNOSIS — M35.00 SJOGREN'S SYNDROME WITHOUT EXTRAGLANDULAR INVOLVEMENT: Primary | ICD-10-CM

## 2023-06-15 PROCEDURE — 1159F PR MEDICATION LIST DOCUMENTED IN MEDICAL RECORD: ICD-10-PCS | Mod: CPTII,95,, | Performed by: INTERNAL MEDICINE

## 2023-06-15 PROCEDURE — 99215 PR OFFICE/OUTPT VISIT, EST, LEVL V, 40-54 MIN: ICD-10-PCS | Mod: 95,,, | Performed by: INTERNAL MEDICINE

## 2023-06-15 PROCEDURE — 1160F RVW MEDS BY RX/DR IN RCRD: CPT | Mod: CPTII,95,, | Performed by: INTERNAL MEDICINE

## 2023-06-15 PROCEDURE — 1160F PR REVIEW ALL MEDS BY PRESCRIBER/CLIN PHARMACIST DOCUMENTED: ICD-10-PCS | Mod: CPTII,95,, | Performed by: INTERNAL MEDICINE

## 2023-06-15 PROCEDURE — 1159F MED LIST DOCD IN RCRD: CPT | Mod: CPTII,95,, | Performed by: INTERNAL MEDICINE

## 2023-06-15 PROCEDURE — 99215 OFFICE O/P EST HI 40 MIN: CPT | Mod: 95,,, | Performed by: INTERNAL MEDICINE

## 2023-06-15 RX ORDER — PREDNISONE 5 MG/1
5-20 TABLET ORAL DAILY PRN
Qty: 75 TABLET | Refills: 3 | Status: SHIPPED | OUTPATIENT
Start: 2023-06-15 | End: 2023-09-25 | Stop reason: SDUPTHER

## 2023-06-15 RX ORDER — METHOTREXATE 2.5 MG/1
10 TABLET ORAL
Qty: 16 TABLET | Refills: 2 | Status: SHIPPED | OUTPATIENT
Start: 2023-06-15 | End: 2023-09-25

## 2023-06-15 RX ORDER — FOLIC ACID 1 MG/1
1 TABLET ORAL DAILY
Qty: 100 TABLET | Refills: 3 | Status: SHIPPED | OUTPATIENT
Start: 2023-06-15 | End: 2023-09-25

## 2023-06-15 NOTE — Clinical Note
Labs sometime in August please tawnya  F/u in 3 months if we can find a spot that soon if not 4month is ok.  Thanks have a nice day! Dr. Buchanan

## 2023-06-15 NOTE — PROGRESS NOTES
Subjective:          Chief Complaint: Shamika Young is a 42 y.o. female who had no chief complaint listed for this encounter.    HPI: Primary Sjogren's w/ cryoglobulinemic vasculitis.  (++RF, +SSA, +SSB, +CHELSEA)     Interval events:    6/2023:     Doing better with warm weather.   Fatigue continues w/ stress. She flares with travel. Flares with viral infections.   Continues: with mottleing but no petechial rash.   No numbness or tingling.    She notes some rashes with dusky changes at the knee exacerbates with stress.     Patient has been doing well overall since last RTX (Ruxience) 8/2022.   Patient started in mid Jan with urticarial rashes, livedo and petechia in bilateral LE. We elected to repeat Ruxience (RTX)- Last Ruxience (RTX) 3/13/2023  Pins in needles in forearm but this appears to be positional. Trying to adjust sleep and desk  + Fatigue as well.   Raynauds always worse in winter.   No SOB/LÓPEZ no hemoptysis.     Continues on    Rituxan periodically based on Cryos, symptoms and lab markers.  Last infusion   HCQ 200mg BID- last eye 2021. Dr. Glover no maculopathy will need to call.   Prednisone 5mg- only uses in pulse tapers (20-30mg few days)  Component      Latest Ref Rng & Units 5/8/2023 2/8/2023 2/8/2023 4/25/2022           12:06 PM 12:06 PM    Anti Sm Antibody      0.00 - 0.99 Ratio  0.12     Anti-Sm Interpretation      Negative  Negative     Anti-SSA Antibody      0.00 - 0.99 Ratio  3.94 (H) 3.94 (H)    Anti-SSA Interpretation      Negative  Positive (A) Positive (A)    Anti-SSB Antibody      0.00 - 0.99 Ratio  3.14 (H) 3.14 (H)    Anti-SSB Interpretation      Negative  Positive (A) Positive (A)    ds DNA Ab      Negative 1:10  Negative 1:10     Anti Sm/RNP Antibody      0.00 - 0.99 Ratio  0.22     Anti-Sm/RNP Interpretation      Negative  Negative     Rheumatoid Factor      0.0 - 15.0 IU/mL       Cryoglobulin, Qual      Absent   Absent Absent   CHELSEA Screen      Negative <1:80   Positive (A)    CHELSEA  PATTERN 1         Speckled    CHELSEA Titer 1         1:640    Sed Rate      0 - 36 mm/Hr 5      CRP      0.0 - 8.2 mg/L 1.0        Component      Latest Ref Rng & Units 3/3/2020             Anti Sm Antibody      0.00 - 0.99 Ratio    Anti-Sm Interpretation      Negative    Anti-SSA Antibody      0.00 - 0.99 Ratio 4.67 (H)   Anti-SSA Interpretation      Negative Positive (A)   Anti-SSB Antibody      0.00 - 0.99 Ratio 2.40 (H)   Anti-SSB Interpretation      Negative Positive (A)   ds DNA Ab      Negative 1:10    Anti Sm/RNP Antibody      0.00 - 0.99 Ratio    Anti-Sm/RNP Interpretation      Negative    Rheumatoid Factor      0.0 - 15.0 IU/mL 484.0 (H)   Cryoglobulin, Qual      Absent    CHELSEA Screen      Negative <1:80    CHELSEA PATTERN 1          CHELSEA Titer 1          Sed Rate      0 - 36 mm/Hr    CRP      0.0 - 8.2 mg/L        Rheum Hx:   Patient is a 39-year-old female being referred by Dr. Chopra with hx of cryoglobulinemia vasculitis and Primary Sjogren's.    Patient seen at [facility]. Ultimately diagnosed [id number].  She states rash started during pregnancy of legs started with purpura of legs. She then developed more diffuse coalescing purpura but no open sores. No renal or respiratory disease to date.   Patient did have skin bx: LCV  Previously hypocomplementemic.   She did note fewer skin purpuric lesions after 2 cycles of RTX   She has noted more urticaria with wheal in the skin,  some reactions with food, wheat/rice seem to be worst. She notes no new soaps or detergents. Present since 2017.   She has seen allergist with some nut and environmental allergies no changes present since childhood. - working with DR. Curtis.   Dry eyes present but tolerates contacts.   Dry mouth more problematic.   Joints fluctuate between 6-9/10. Hand and wrist predominant.   No real Raynauds in triphasic pattern but cold hands and feet.   Patient c/o swelling of lips at times-no known food allergies.       She is noting more  livedo and mottling in legs w/o activity, ++ fatigue increasing. She does note during flares some joint pains. 2-5 days every 2 weeks. Flares seem to be ocurring every 2 weeks.   She notes working from home has helped with overall fatigue/pain.   No numbness or tingling.     PFTs at [facility] were reportedly normal 7/2019 , no hemoptysis or SOB  Thinks she had CT chest.   No bone marrow bx.   No DVT, miscarriages, seizures or strokes.   No renal disease.        She is on hydroxychloroquine 200 mg twice daily.  Rituxan last dose 7/2019  cevimiline 30mg TID  Prednisone 10mg once daily PRN ( during flare daily rest is periodical.   Rituximab last dose 7/2019 (x 3 treatments 6 months apart. )     OTC:   Xylimelts.   Not using biotene.   sugarfree lemon  gylcerin lozenges.     Previous medications: Salagen with ASE with HA.   No hx of hepatitis, no malignancy in self to date.   Component      Latest Ref Rng & Units 3/3/2020   Anti-SSA Antibody      0.00 - 0.99 Ratio 4.67 (H)   Anti-SSA Interpretation      Negative Positive (A)   Anti-SSB Antibody      0.00 - 0.99 Ratio 2.40 (H)   Anti-SSB Interpretation      Negative Positive (A)   Anti Sm Antibody      0.00 - 0.99 Ratio 0.07   Anti-Sm Interpretation      Negative Negative   Anti Sm/RNP Antibody      0.00 - 0.99 Ratio 0.17   Anti-Sm/RNP Interpretation      Negative Negative   Rheumatoid Factor      0.0 - 15.0 IU/mL 484.0 (H)   CCP Antibodies      <5.0 U/mL <0.5   CHELSEA Screen      Negative <1:80 Positive (A)   ds DNA Ab      Negative 1:10 Negative 1:10   CHELSEA HEP-2 Titer       Positive >=1:2560 Speckled         REVIEW OF SYSTEMS:    Review of Systems   Constitutional:  Positive for malaise/fatigue. Negative for fever and weight loss.   HENT:  Negative for sore throat.    Eyes:  Negative for double vision, photophobia and redness.   Respiratory:  Negative for cough, shortness of breath and wheezing.    Cardiovascular:  Negative for chest pain, palpitations and orthopnea.    Gastrointestinal:  Negative for abdominal pain, constipation and diarrhea.   Genitourinary:  Negative for dysuria, hematuria and urgency.   Musculoskeletal:  Positive for joint pain and myalgias. Negative for back pain.   Skin:  Positive for rash (livedo).   Neurological:  Negative for dizziness, tingling, focal weakness and headaches.   Endo/Heme/Allergies:  Does not bruise/bleed easily.   Psychiatric/Behavioral:  Negative for depression, hallucinations and suicidal ideas. The patient is nervous/anxious.              Objective:            Past Medical History:   Diagnosis Date    Allergy     previously on immunotherapy    Anxiety     Depression     Headache     Lumbago with sciatica, right side     Lumbar disc disease     L4-L5    Other chronic pain     Panic disorder     Positive CHELSEA (antinuclear antibody)     Purpura     Sjogren's syndrome     Vasculitis      Family History   Problem Relation Age of Onset    Multiple sclerosis Mother     Heart disease Mother     Diabetes Mother     Hypertension Mother     Heart disease Father     Hyperlipidemia Father     Glaucoma Neg Hx     Retinal detachment Neg Hx     Macular degeneration Neg Hx      Social History     Tobacco Use    Smoking status: Former    Smokeless tobacco: Never   Substance Use Topics    Alcohol use: Yes     Comment: socially    Drug use: Never         Current Outpatient Medications on File Prior to Visit   Medication Sig Dispense Refill    albuterol (PROVENTIL/VENTOLIN HFA) 90 mcg/actuation inhaler INHALE 2 PUFFS INTO THE LUNGS EVERY 4 HOURS AS NEEDED FOR WHEEZING OR SHORTNESS OF BREATH 18 g 4    atogepant (QULIPTA) 60 mg Tab Take 1 tablet by mouth daily 30 tablet 6    azelastine (ASTELIN) 137 mcg (0.1 %) nasal spray 1 spray (137 mcg total) by Nasal route 2 (two) times daily. 30 mL 0    buPROPion (WELLBUTRIN) 75 MG tablet Take 75 mg by mouth once daily.      busPIRone (BUSPAR) 10 MG tablet Take 10 mg by mouth 3 (three) times daily.      cevimeline  (EVOXAC) 30 mg capsule Take 1 capsule (30 mg total) by mouth 3 (three) times daily. 270 capsule 3    CHOLINE ORAL Take by mouth.      ergocalciferol, vitamin D2, (VITAMIN D ORAL) Take by mouth.      hydrOXYchloroQUINE (PLAQUENIL) 200 mg tablet Take 1 tablet (200 mg total) by mouth 2 (two) times a day. 60 tablet 11    Lactobac no.41/Bifidobact no.7 (PROBIOTIC-10 ORAL) Take by mouth. bromoline      LORazepam (ATIVAN) 1 MG tablet Take 1 mg by mouth daily as needed.      methocarbamoL (ROBAXIN) 500 MG Tab Take 1 tablet (500 mg total) by mouth every 12 (twelve) hours as needed (muscle spasms/ pain). 40 tablet 0    methylphenidate HCl (RITALIN) 20 MG tablet Take 20 mg by mouth once daily.      montelukast (SINGULAIR) 10 mg tablet TAKE 1 TABLET(10 MG) BY MOUTH EVERY EVENING 90 tablet 3    NURTEC 75 mg odt Take 75 mg by mouth once as needed.      promethazine-dextromethorphan (PROMETHAZINE-DM) 6.25-15 mg/5 mL Syrp Take 5 mLs by mouth every 6 (six) hours as needed (cough). 118 mL 0    thymol/chlorophyllin (CHLOROPHYLL ORAL) Take by mouth.      traMADoL (ULTRAM) 50 mg tablet Take 1 tablet (50 mg total) by mouth every 8 (eight) hours as needed for Pain (joint pains dx Primary Sjogrens with vasculitis.). 40 tablet 3    TYROSINE ORAL Take by mouth.      VYVANSE 30 mg capsule Take 30 mg by mouth nightly.       No current facility-administered medications on file prior to visit.       There were no vitals filed for this visit.      Physical Exam:    Physical Exam  Constitutional:       Appearance: She is well-developed.   Eyes:      General: Lids are normal.   Skin:     Comments: Legs with some mottling and livedo.    Neurological:      Mental Status: She is oriented to person, place, and time.   Psychiatric:         Behavior: Behavior normal.         Thought Content: Thought content normal.             Assessment:       Encounter Diagnoses   Name Primary?    Sjogren's syndrome without extraglandular involvement Yes     Cryoglobulinemic vasculitis     High risk medications (not anticoagulants) long-term use     Inflammatory polyarthritis             Plan:        Sjogren's syndrome without extraglandular involvement  -     methotrexate 2.5 MG Tab; Take 4 tablets (10 mg total) by mouth every 7 days.  Dispense: 16 tablet; Refill: 2  -     folic acid (FOLVITE) 1 MG tablet; Take 1 tablet (1 mg total) by mouth once daily.  Dispense: 100 tablet; Refill: 3  -     CBC Auto Differential; Standing; Expected date: 06/15/2023  -     Comprehensive Metabolic Panel; Standing; Expected date: 06/15/2023  -     Sedimentation rate; Standing; Expected date: 06/15/2023  -     C-Reactive Protein; Standing; Expected date: 06/15/2023    Cryoglobulinemic vasculitis  -     methotrexate 2.5 MG Tab; Take 4 tablets (10 mg total) by mouth every 7 days.  Dispense: 16 tablet; Refill: 2    High risk medications (not anticoagulants) long-term use  -     methotrexate 2.5 MG Tab; Take 4 tablets (10 mg total) by mouth every 7 days.  Dispense: 16 tablet; Refill: 2  -     folic acid (FOLVITE) 1 MG tablet; Take 1 tablet (1 mg total) by mouth once daily.  Dispense: 100 tablet; Refill: 3  -     CBC Auto Differential; Standing; Expected date: 06/15/2023  -     Comprehensive Metabolic Panel; Standing; Expected date: 06/15/2023  -     Sedimentation rate; Standing; Expected date: 06/15/2023  -     C-Reactive Protein; Standing; Expected date: 06/15/2023    Inflammatory polyarthritis  -     methotrexate 2.5 MG Tab; Take 4 tablets (10 mg total) by mouth every 7 days.  Dispense: 16 tablet; Refill: 2  -     folic acid (FOLVITE) 1 MG tablet; Take 1 tablet (1 mg total) by mouth once daily.  Dispense: 100 tablet; Refill: 3  -     CBC Auto Differential; Standing; Expected date: 06/15/2023  -     Comprehensive Metabolic Panel; Standing; Expected date: 06/15/2023  -     Sedimentation rate; Standing; Expected date: 06/15/2023  -     C-Reactive Protein; Standing; Expected date:  06/15/2023    Other orders  -     predniSONE (DELTASONE) 5 MG tablet; Take 1-4 tablets (5-20 mg total) by mouth daily as needed (sjogrens/cryoglbulinemia). May take for flares PRN  Dispense: 75 tablet; Refill: 3           Patient with Primary Sjogrens, Cryoglobulinemic vasculitis treated last 7/20222022/81/22 per RA protocol          Do not send to [facility] we prefer [facility].    Tramadol PRN    Prednisone 5mg daily, may increase to 10 PRN flares.    Hep and T. Spot neg 2020  Holding on RTX to complete todays visit and labs then can schedule.   Adaptogenic supplement seems to be allowing her to reduce prednisone, flares of vasculitis and I    Primary Sjogrens:   Start MTX 10mg weekly with folic acid 1mg daily.    HCQ 200mg BID   Evoxac 30mg TID    Likely FMS: robaxin      We also reviewed the risks benefits and monitoring requirements of methotrexate therapy; not limited to weekly dosing, monitoring requirements, daily folic acid the avoidance of alcohol and the potential abortifacient and teratogenic nature.   Minor elevations in aminotransferases are common, but hepatic steatosis, fibrosis, and cirrhosis may infrequently occur. Routine use of daily folic acid supplementation, which is associated with a reduced risk of hepatic transaminase elevations. Pulmonary toxicity of MTX is seen with both high- and low-dose treatment .MTX is an immunomodulatory but not significantly immunosuppressive agent; it is not associated with opportunistic infections in the overwhelming majority of patients. Hematologic toxicity is possible, but a more serious abnormality is the development of pancytopenia. Lymphoproliferative disorders occur with increased frequency in RA, independent of specific therapies, but MTX therapy may increase such risk    No follow-ups on file.      40min consultation with greater than 50% of that time included Preparing to see the patient (review records, tests), Obtaining and/or reviewing separately obtained  historical data, Performing a medically appropriate examination and/or evaluation , Ordering medications, tests, and/or procedures, Referring and communicating with other healthcare professionals , Documenting clinical information in the electronic or other health record and Independently interpreting results  (as warranted) & communicating results to the patient/family/caregiver. All questions answered.    Thank you for allowing me to participate in the care of this very pleasant patient.        The patient location is: Home [place]  The chief complaint leading to consultation is: medication management and f/u   Visit type: Virtual visit with synchronous audio and video  Total time spent with patient: 40min  Each patient to whom he or she provides medical services by telemedicine is:  (1) informed of the relationship between the physician and patient and the respective role of any other health care provider with respect to management of the patient; and (2) notified that he or she may decline to receive medical services by telemedicine and may withdraw from such care at any time.    Notes:                  Answers submitted by the patient for this visit:  Rheumatology Questionnaire (Submitted on 6/15/2023)  mouth sores: No  trouble swallowing: Yes  unexpected weight change: No  genital sore: No

## 2023-06-21 RX ORDER — HYDROXYCHLOROQUINE SULFATE 200 MG/1
TABLET, FILM COATED ORAL
Qty: 60 TABLET | Refills: 11 | OUTPATIENT
Start: 2023-06-21

## 2023-06-22 ENCOUNTER — PATIENT MESSAGE (OUTPATIENT)
Dept: RHEUMATOLOGY | Facility: CLINIC | Age: 43
End: 2023-06-22
Payer: COMMERCIAL

## 2023-06-24 DIAGNOSIS — M35.00 SJOGREN'S SYNDROME WITHOUT EXTRAGLANDULAR INVOLVEMENT: ICD-10-CM

## 2023-06-24 DIAGNOSIS — L50.9 URTICARIA: ICD-10-CM

## 2023-06-24 DIAGNOSIS — D89.1 CRYOGLOBULINEMIC VASCULITIS: ICD-10-CM

## 2023-06-24 DIAGNOSIS — I73.00 RAYNAUD'S PHENOMENON WITHOUT GANGRENE: ICD-10-CM

## 2023-06-24 RX ORDER — HYDROXYCHLOROQUINE SULFATE 200 MG/1
200 TABLET, FILM COATED ORAL 2 TIMES DAILY
Qty: 60 TABLET | Refills: 11 | Status: CANCELLED | OUTPATIENT
Start: 2023-06-24

## 2023-06-26 ENCOUNTER — PATIENT MESSAGE (OUTPATIENT)
Dept: RHEUMATOLOGY | Facility: CLINIC | Age: 43
End: 2023-06-26
Payer: COMMERCIAL

## 2023-06-26 RX ORDER — CEVIMELINE HYDROCHLORIDE 30 MG/1
30 CAPSULE ORAL 3 TIMES DAILY
Qty: 90 CAPSULE | Refills: 11 | Status: SHIPPED | OUTPATIENT
Start: 2023-06-26 | End: 2023-08-02

## 2023-06-26 RX ORDER — CEVIMELINE HYDROCHLORIDE 30 MG/1
CAPSULE ORAL
Qty: 90 CAPSULE | Refills: 0 | Status: SHIPPED | OUTPATIENT
Start: 2023-06-26 | End: 2023-06-26 | Stop reason: SDUPTHER

## 2023-06-27 ENCOUNTER — PATIENT MESSAGE (OUTPATIENT)
Dept: FAMILY MEDICINE | Facility: CLINIC | Age: 43
End: 2023-06-27
Payer: COMMERCIAL

## 2023-06-27 NOTE — TELEPHONE ENCOUNTER
Pt will be going out of town and would like a refill on her epi pen       I do not see were this Pt has had a epi pen       Please advise.

## 2023-06-27 NOTE — TELEPHONE ENCOUNTER
Staff please call her pharmacy to figure out what the hold up is. Nothing more I can do but keep sending same script.     Dr. Buchanan

## 2023-06-29 ENCOUNTER — PATIENT MESSAGE (OUTPATIENT)
Dept: FAMILY MEDICINE | Facility: CLINIC | Age: 43
End: 2023-06-29
Payer: COMMERCIAL

## 2023-06-29 DIAGNOSIS — Z87.892 HISTORY OF ANAPHYLAXIS: Primary | ICD-10-CM

## 2023-06-29 RX ORDER — EPINEPHRINE 0.3 MG/.3ML
2 INJECTION SUBCUTANEOUS ONCE
Qty: 0.6 ML | Refills: 0 | Status: SHIPPED | OUTPATIENT
Start: 2023-06-29 | End: 2023-06-29

## 2023-07-28 ENCOUNTER — PATIENT MESSAGE (OUTPATIENT)
Dept: FAMILY MEDICINE | Facility: CLINIC | Age: 43
End: 2023-07-28
Payer: COMMERCIAL

## 2023-07-28 DIAGNOSIS — G89.29 CHRONIC SI JOINT PAIN: Primary | ICD-10-CM

## 2023-07-28 DIAGNOSIS — M53.3 CHRONIC SI JOINT PAIN: Primary | ICD-10-CM

## 2023-07-31 DIAGNOSIS — I73.00 RAYNAUD'S PHENOMENON WITHOUT GANGRENE: ICD-10-CM

## 2023-07-31 DIAGNOSIS — M35.00 SJOGREN'S SYNDROME WITHOUT EXTRAGLANDULAR INVOLVEMENT: ICD-10-CM

## 2023-07-31 DIAGNOSIS — L50.9 URTICARIA: ICD-10-CM

## 2023-07-31 DIAGNOSIS — D89.1 CRYOGLOBULINEMIC VASCULITIS: ICD-10-CM

## 2023-08-02 RX ORDER — CEVIMELINE HYDROCHLORIDE 30 MG/1
30 CAPSULE ORAL 3 TIMES DAILY
Qty: 90 CAPSULE | Refills: 11 | Status: SHIPPED | OUTPATIENT
Start: 2023-08-02 | End: 2024-01-09 | Stop reason: SDUPTHER

## 2023-08-16 ENCOUNTER — PATIENT MESSAGE (OUTPATIENT)
Dept: RHEUMATOLOGY | Facility: CLINIC | Age: 43
End: 2023-08-16
Payer: COMMERCIAL

## 2023-08-22 ENCOUNTER — TELEPHONE (OUTPATIENT)
Dept: RHEUMATOLOGY | Facility: CLINIC | Age: 43
End: 2023-08-22

## 2023-08-22 ENCOUNTER — PATIENT OUTREACH (OUTPATIENT)
Dept: RHEUMATOLOGY | Facility: CLINIC | Age: 43
End: 2023-08-22
Payer: COMMERCIAL

## 2023-08-22 DIAGNOSIS — D89.1 CRYOGLOBULINEMIC VASCULITIS: Primary | ICD-10-CM

## 2023-08-22 DIAGNOSIS — R53.81 PHYSICAL DEBILITY: ICD-10-CM

## 2023-08-22 DIAGNOSIS — M35.00 SJOGREN'S SYNDROME WITHOUT EXTRAGLANDULAR INVOLVEMENT: ICD-10-CM

## 2023-08-22 DIAGNOSIS — D89.0 POLYCLONAL GAMMOPATHY: ICD-10-CM

## 2023-08-22 DIAGNOSIS — M06.4 INFLAMMATORY POLYARTHRITIS: ICD-10-CM

## 2023-08-22 PROCEDURE — 99358 PR PROLONGED SERV,NO CONTACT,1ST HR: ICD-10-PCS | Mod: S$GLB,,, | Performed by: INTERNAL MEDICINE

## 2023-08-22 PROCEDURE — 99358 PROLONG SERVICE W/O CONTACT: CPT | Mod: S$GLB,,, | Performed by: INTERNAL MEDICINE

## 2023-08-28 ENCOUNTER — TELEPHONE (OUTPATIENT)
Dept: RHEUMATOLOGY | Facility: CLINIC | Age: 43
End: 2023-08-28
Payer: COMMERCIAL

## 2023-08-28 NOTE — TELEPHONE ENCOUNTER
Received message that patient has RTX infusion on 8/30 and needs new orders. Seems that patient receives RTX periodically based on Cryos, symptoms and lab markers. Last infusion 3/13/23. Re-entered and routed to provider for review and signature. Annual TB, Hep B, and Hep C up to date and last completed 2/8/23.

## 2023-09-14 ENCOUNTER — PATIENT MESSAGE (OUTPATIENT)
Dept: NEUROLOGY | Facility: CLINIC | Age: 43
End: 2023-09-14
Payer: COMMERCIAL

## 2023-09-14 DIAGNOSIS — M54.12 CERVICAL RADICULOPATHY: ICD-10-CM

## 2023-09-14 DIAGNOSIS — M54.2 CERVICALGIA: ICD-10-CM

## 2023-09-14 DIAGNOSIS — M47.812 CERVICAL SPONDYLOSIS: ICD-10-CM

## 2023-09-15 ENCOUNTER — PATIENT MESSAGE (OUTPATIENT)
Dept: RHEUMATOLOGY | Facility: CLINIC | Age: 43
End: 2023-09-15
Payer: COMMERCIAL

## 2023-09-15 RX ORDER — METHOCARBAMOL 500 MG/1
500 TABLET, FILM COATED ORAL EVERY 12 HOURS PRN
Qty: 40 TABLET | Refills: 0 | Status: SHIPPED | OUTPATIENT
Start: 2023-09-15

## 2023-09-15 RX ORDER — ATOGEPANT 60 MG/1
TABLET ORAL
Qty: 30 TABLET | Refills: 12 | Status: SHIPPED | OUTPATIENT
Start: 2023-09-15 | End: 2023-12-18

## 2023-09-20 ENCOUNTER — PATIENT MESSAGE (OUTPATIENT)
Dept: RHEUMATOLOGY | Facility: CLINIC | Age: 43
End: 2023-09-20
Payer: COMMERCIAL

## 2023-09-20 ENCOUNTER — PATIENT MESSAGE (OUTPATIENT)
Dept: FAMILY MEDICINE | Facility: CLINIC | Age: 43
End: 2023-09-20
Payer: COMMERCIAL

## 2023-09-21 ENCOUNTER — PATIENT MESSAGE (OUTPATIENT)
Dept: RHEUMATOLOGY | Facility: CLINIC | Age: 43
End: 2023-09-21
Payer: COMMERCIAL

## 2023-09-25 ENCOUNTER — OFFICE VISIT (OUTPATIENT)
Dept: RHEUMATOLOGY | Facility: CLINIC | Age: 43
End: 2023-09-25
Payer: COMMERCIAL

## 2023-09-25 DIAGNOSIS — M06.4 INFLAMMATORY POLYARTHRITIS: ICD-10-CM

## 2023-09-25 DIAGNOSIS — M35.00 SJOGREN'S SYNDROME WITHOUT EXTRAGLANDULAR INVOLVEMENT: ICD-10-CM

## 2023-09-25 DIAGNOSIS — D89.1 CRYOGLOBULINEMIC VASCULITIS: Primary | ICD-10-CM

## 2023-09-25 DIAGNOSIS — M79.7 FIBROMYALGIA: ICD-10-CM

## 2023-09-25 DIAGNOSIS — D84.9 IMMUNOSUPPRESSION: ICD-10-CM

## 2023-09-25 PROCEDURE — 1159F MED LIST DOCD IN RCRD: CPT | Mod: CPTII,95,, | Performed by: INTERNAL MEDICINE

## 2023-09-25 PROCEDURE — 1159F PR MEDICATION LIST DOCUMENTED IN MEDICAL RECORD: ICD-10-PCS | Mod: CPTII,95,, | Performed by: INTERNAL MEDICINE

## 2023-09-25 PROCEDURE — 99215 PR OFFICE/OUTPT VISIT, EST, LEVL V, 40-54 MIN: ICD-10-PCS | Mod: 95,,, | Performed by: INTERNAL MEDICINE

## 2023-09-25 PROCEDURE — 99215 OFFICE O/P EST HI 40 MIN: CPT | Mod: 95,,, | Performed by: INTERNAL MEDICINE

## 2023-09-25 PROCEDURE — 1160F RVW MEDS BY RX/DR IN RCRD: CPT | Mod: CPTII,95,, | Performed by: INTERNAL MEDICINE

## 2023-09-25 PROCEDURE — 1160F PR REVIEW ALL MEDS BY PRESCRIBER/CLIN PHARMACIST DOCUMENTED: ICD-10-PCS | Mod: CPTII,95,, | Performed by: INTERNAL MEDICINE

## 2023-09-25 RX ORDER — PREDNISONE 5 MG/1
5-20 TABLET ORAL DAILY PRN
Qty: 75 TABLET | Refills: 3 | Status: SHIPPED | OUTPATIENT
Start: 2023-09-25 | End: 2023-12-28

## 2023-09-25 RX ORDER — TRAMADOL HYDROCHLORIDE 50 MG/1
50 TABLET ORAL EVERY 8 HOURS PRN
Qty: 40 TABLET | Refills: 3 | Status: SHIPPED | OUTPATIENT
Start: 2023-09-25

## 2023-09-25 RX ORDER — PILOCARPINE HYDROCHLORIDE 5 MG/1
5 TABLET, FILM COATED ORAL 2 TIMES DAILY
Qty: 60 TABLET | Refills: 0 | Status: SHIPPED | OUTPATIENT
Start: 2023-09-25 | End: 2024-01-09

## 2023-09-25 NOTE — PROGRESS NOTES
"  Subjective:          Chief Complaint: Shamika Young is a 42 y.o. female who had no chief complaint listed for this encounter.    HPI: Primary Sjogren's w/ cryoglobulinemic vasculitis.  (++RF, +SSA, +SSB, +CHELSEA)     Interval events:    9/2023: Patient doing "ok" having fatigue. Noting urticaria and petechia none that opened. Seems to flare with stress and improved now lasted about 5 days.  Legs only.  Last RTX was 3/2023.   We elected to hold on RTX   But did start her on MTX- she started MTX at 10mg x 2 weeks with malaise, nausea for about 3 days felt she would never tolerate this.   She is noting dryness of her eyes and mouth: evoxac still help. Salagen triggered.          6/2023:   Doing better with warm weather.   Fatigue continues w/ stress. She flares with travel. Flares with viral infections.   Continues: with mottleing but no petechial rash.   No numbness or tingling.    She notes some rashes with dusky changes at the knee exacerbates with stress.     Patient has been doing well overall since last RTX (Ruxience) 8/2022.   Patient started in mid Jan with urticarial rashes, livedo and petechia in bilateral LE. We elected to repeat Ruxience (RTX)- Last Ruxience (RTX) 3/13/2023  Pins in needles in forearm but this appears to be positional. Trying to adjust sleep and desk  + Fatigue as well.   Raynauds always worse in winter.   No SOB/LÓPEZ no hemoptysis.     Continues on    Rituxan periodically based on Cryos, symptoms and lab markers.  Last infusion   HCQ 200mg BID- last eye 2021. Dr. Glover no maculopathy will need to call.   Prednisone 5mg- only uses in pulse tapers (20-30mg few days)  Component      Latest Ref Rng & Units 5/8/2023 2/8/2023 2/8/2023 4/25/2022           12:06 PM 12:06 PM    Anti Sm Antibody      0.00 - 0.99 Ratio  0.12     Anti-Sm Interpretation      Negative  Negative     Anti-SSA Antibody      0.00 - 0.99 Ratio  3.94 (H) 3.94 (H)    Anti-SSA Interpretation      Negative  Positive (A) " Positive (A)    Anti-SSB Antibody      0.00 - 0.99 Ratio  3.14 (H) 3.14 (H)    Anti-SSB Interpretation      Negative  Positive (A) Positive (A)    ds DNA Ab      Negative 1:10  Negative 1:10     Anti Sm/RNP Antibody      0.00 - 0.99 Ratio  0.22     Anti-Sm/RNP Interpretation      Negative  Negative     Rheumatoid Factor      0.0 - 15.0 IU/mL       Cryoglobulin, Qual      Absent   Absent Absent   CHELSEA Screen      Negative <1:80   Positive (A)    CHELSEA PATTERN 1         Speckled    CHELSEA Titer 1         1:640    Sed Rate      0 - 36 mm/Hr 5      CRP      0.0 - 8.2 mg/L 1.0        Component      Latest Ref Rng & Units 3/3/2020             Anti Sm Antibody      0.00 - 0.99 Ratio    Anti-Sm Interpretation      Negative    Anti-SSA Antibody      0.00 - 0.99 Ratio 4.67 (H)   Anti-SSA Interpretation      Negative Positive (A)   Anti-SSB Antibody      0.00 - 0.99 Ratio 2.40 (H)   Anti-SSB Interpretation      Negative Positive (A)   ds DNA Ab      Negative 1:10    Anti Sm/RNP Antibody      0.00 - 0.99 Ratio    Anti-Sm/RNP Interpretation      Negative    Rheumatoid Factor      0.0 - 15.0 IU/mL 484.0 (H)   Cryoglobulin, Qual      Absent    CHELSEA Screen      Negative <1:80    CHELSEA PATTERN 1          CHELSEA Titer 1          Sed Rate      0 - 36 mm/Hr    CRP      0.0 - 8.2 mg/L        Rheum Hx:   Patient is a 39-year-old female being referred by Dr. Chopra with hx of cryoglobulinemia vasculitis and Primary Sjogren's.    Patient seen at [facility]. Ultimately diagnosed [id number].  She states rash started during pregnancy of legs started with purpura of legs. She then developed more diffuse coalescing purpura but no open sores. No renal or respiratory disease to date.   Patient did have skin bx: LCV  Previously hypocomplementemic.   She did note fewer skin purpuric lesions after 2 cycles of RTX   She has noted more urticaria with wheal in the skin,  some reactions with food, wheat/rice seem to be worst. She notes no new soaps or  detergents. Present since 2017.   She has seen allergist with some nut and environmental allergies no changes present since childhood. - working with DR. Curtis.   Dry eyes present but tolerates contacts.   Dry mouth more problematic.   Joints fluctuate between 6-9/10. Hand and wrist predominant.   No real Raynauds in triphasic pattern but cold hands and feet.   Patient c/o swelling of lips at times-no known food allergies.       She is noting more livedo and mottling in legs w/o activity, ++ fatigue increasing. She does note during flares some joint pains. 2-5 days every 2 weeks. Flares seem to be ocurring every 2 weeks.   She notes working from home has helped with overall fatigue/pain.   No numbness or tingling.     PFTs at [place] were reportedly normal 7/2019 , no hemoptysis or SOB  Thinks she had CT chest.   No bone marrow bx.   No DVT, miscarriages, seizures or strokes.   No renal disease.        She is on hydroxychloroquine 200 mg twice daily.  Rituxan last dose 7/2019  cevimiline 30mg TID  Prednisone 10mg once daily PRN ( during flare daily rest is periodical.   Rituximab last dose 7/2019 (x 3 treatments 6 months apart. )     OTC:   Xylimelts.   Not using biotene.   sugarfree lemon  gylcerin lozenges.     Previous medications: Salagen with ASE with HA.   No hx of hepatitis, no malignancy in self to date.   Component      Latest Ref Rng & Units 3/3/2020   Anti-SSA Antibody      0.00 - 0.99 Ratio 4.67 (H)   Anti-SSA Interpretation      Negative Positive (A)   Anti-SSB Antibody      0.00 - 0.99 Ratio 2.40 (H)   Anti-SSB Interpretation      Negative Positive (A)   Anti Sm Antibody      0.00 - 0.99 Ratio 0.07   Anti-Sm Interpretation      Negative Negative   Anti Sm/RNP Antibody      0.00 - 0.99 Ratio 0.17   Anti-Sm/RNP Interpretation      Negative Negative   Rheumatoid Factor      0.0 - 15.0 IU/mL 484.0 (H)   CCP Antibodies      <5.0 U/mL <0.5   CHELSEA Screen      Negative <1:80 Positive (A)   ds DNA Ab       Negative 1:10 Negative 1:10   CHELSEA HEP-2 Titer       Positive >=1:2560 Speckled         REVIEW OF SYSTEMS:    Review of Systems   Constitutional:  Negative for fever, malaise/fatigue and weight loss.   HENT:  Negative for sore throat.    Eyes:  Positive for pain and redness. Negative for double vision and photophobia.   Respiratory:  Negative for cough, shortness of breath and wheezing.    Cardiovascular:  Negative for chest pain, palpitations and orthopnea.   Gastrointestinal:  Negative for abdominal pain, constipation and diarrhea.   Genitourinary:  Negative for dysuria, hematuria and urgency.   Musculoskeletal:  Positive for joint pain. Negative for back pain and myalgias.   Skin:  Positive for rash (cryos with petechia one episode).   Neurological:  Positive for headaches. Negative for dizziness, tingling and focal weakness.   Endo/Heme/Allergies:  Does not bruise/bleed easily.   Psychiatric/Behavioral:  Negative for depression, hallucinations and suicidal ideas.                Objective:            Past Medical History:   Diagnosis Date    Allergy     previously on immunotherapy    Anxiety     Depression     Headache     Lumbago with sciatica, right side     Lumbar disc disease     L4-L5    Other chronic pain     Panic disorder     Positive CHELSEA (antinuclear antibody)     Purpura     Sjogren's syndrome     Vasculitis      Family History   Problem Relation Age of Onset    Multiple sclerosis Mother     Heart disease Mother     Diabetes Mother     Hypertension Mother     Heart disease Father     Hyperlipidemia Father     Glaucoma Neg Hx     Retinal detachment Neg Hx     Macular degeneration Neg Hx      Social History     Tobacco Use    Smoking status: Former    Smokeless tobacco: Never   Substance Use Topics    Alcohol use: Yes     Comment: socially    Drug use: Never         Current Outpatient Medications on File Prior to Visit   Medication Sig Dispense Refill    cevimeline (EVOXAC) 30 mg capsule TAKE 1 CAPSULE BY  MOUTH THREE TIMES DAILY 90 capsule 11    albuterol (PROVENTIL/VENTOLIN HFA) 90 mcg/actuation inhaler INHALE 2 PUFFS INTO THE LUNGS EVERY 4 HOURS AS NEEDED FOR WHEEZING OR SHORTNESS OF BREATH 18 g 4    atogepant (QULIPTA) 60 mg Tab Take 1 tablet by mouth daily 30 tablet 12    azelastine (ASTELIN) 137 mcg (0.1 %) nasal spray 1 spray (137 mcg total) by Nasal route 2 (two) times daily. 30 mL 0    buPROPion (WELLBUTRIN) 75 MG tablet Take 75 mg by mouth once daily.      busPIRone (BUSPAR) 10 MG tablet Take 10 mg by mouth 3 (three) times daily.      CHOLINE ORAL Take by mouth.      EPINEPHrine (EPIPEN 2-CA) 0.3 mg/0.3 mL AtIn Inject 0.6 mLs (0.6 mg total) into the muscle once. for 1 dose 0.6 mL 0    ergocalciferol, vitamin D2, (VITAMIN D ORAL) Take by mouth.      folic acid (FOLVITE) 1 MG tablet Take 1 tablet (1 mg total) by mouth once daily. 100 tablet 3    hydrOXYchloroQUINE (PLAQUENIL) 200 mg tablet Take 1 tablet (200 mg total) by mouth 2 (two) times a day. 60 tablet 11    Lactobac no.41/Bifidobact no.7 (PROBIOTIC-10 ORAL) Take by mouth. bromoline      LORazepam (ATIVAN) 1 MG tablet Take 1 mg by mouth daily as needed.      methocarbamoL (ROBAXIN) 500 MG Tab Take 1 tablet (500 mg total) by mouth every 12 (twelve) hours as needed (muscle spasms/ pain). 40 tablet 0    methotrexate 2.5 MG Tab Take 4 tablets (10 mg total) by mouth every 7 days. 16 tablet 2    methylphenidate HCl (RITALIN) 20 MG tablet Take 20 mg by mouth once daily.      montelukast (SINGULAIR) 10 mg tablet TAKE 1 TABLET(10 MG) BY MOUTH EVERY EVENING 90 tablet 3    NURTEC 75 mg odt Take 75 mg by mouth once as needed.      predniSONE (DELTASONE) 5 MG tablet Take 1-4 tablets (5-20 mg total) by mouth daily as needed (sjogrens/cryoglbulinemia). May take for flares PRN 75 tablet 3    promethazine-dextromethorphan (PROMETHAZINE-DM) 6.25-15 mg/5 mL Syrp Take 5 mLs by mouth every 6 (six) hours as needed (cough). 118 mL 0    thymol/chlorophyllin (CHLOROPHYLL ORAL)  Take by mouth.      traMADoL (ULTRAM) 50 mg tablet Take 1 tablet (50 mg total) by mouth every 8 (eight) hours as needed for Pain (joint pains dx Primary Sjogrens with vasculitis.). 40 tablet 3    TYROSINE ORAL Take by mouth.      VYVANSE 30 mg capsule Take 30 mg by mouth nightly.       No current facility-administered medications on file prior to visit.       There were no vitals filed for this visit.      Physical Exam:    Physical Exam  Constitutional:       Appearance: She is well-developed.   Eyes:      General: Lids are normal.   Skin:     Comments: Legs with some mottling and livedo.    Neurological:      Mental Status: She is oriented to person, place, and time.   Psychiatric:         Behavior: Behavior normal.         Thought Content: Thought content normal.               Assessment:       Encounter Diagnoses   Name Primary?    Sjogren's syndrome without extraglandular involvement Yes    Cryoglobulinemic vasculitis     Inflammatory polyarthritis             Plan:        Cryoglobulinemic vasculitis  -     CRYOGLOBULIN; Future; Expected date: 09/25/2023  -     Sedimentation rate; Future; Expected date: 09/25/2023  -     C-Reactive Protein; Standing; Expected date: 09/25/2023  -     CBC Auto Differential; Standing; Expected date: 09/25/2023  -     Comprehensive Metabolic Panel; Standing; Expected date: 09/25/2023  -     Sedimentation rate; Standing; Expected date: 09/25/2023  -     C-Reactive Protein; Standing; Expected date: 09/25/2023  -     traMADoL (ULTRAM) 50 mg tablet; Take 1 tablet (50 mg total) by mouth every 8 (eight) hours as needed for Pain (joint pains dx Primary Sjogrens with vasculitis.).  Dispense: 40 tablet; Refill: 3    Sjogren's syndrome without extraglandular involvement  -     CRYOGLOBULIN; Future; Expected date: 09/25/2023  -     Sedimentation rate; Future; Expected date: 09/25/2023  -     C-Reactive Protein; Standing; Expected date: 09/25/2023  -     CBC Auto Differential; Standing;  Expected date: 09/25/2023  -     Comprehensive Metabolic Panel; Standing; Expected date: 09/25/2023  -     Sedimentation rate; Standing; Expected date: 09/25/2023  -     C-Reactive Protein; Standing; Expected date: 09/25/2023  -     traMADoL (ULTRAM) 50 mg tablet; Take 1 tablet (50 mg total) by mouth every 8 (eight) hours as needed for Pain (joint pains dx Primary Sjogrens with vasculitis.).  Dispense: 40 tablet; Refill: 3    Inflammatory polyarthritis  -     traMADoL (ULTRAM) 50 mg tablet; Take 1 tablet (50 mg total) by mouth every 8 (eight) hours as needed for Pain (joint pains dx Primary Sjogrens with vasculitis.).  Dispense: 40 tablet; Refill: 3    Fibromyalgia    Other orders  -     predniSONE (DELTASONE) 5 MG tablet; Take 1-4 tablets (5-20 mg total) by mouth daily as needed (sjogrens/cryoglbulinemia). May take for flares PRN  Dispense: 75 tablet; Refill: 3  -     pilocarpine (SALAGEN) 5 MG Tab; Take 1 tablet (5 mg total) by mouth 2 (two) times daily.  Dispense: 60 tablet; Refill: 0         Cryoglobulinemic vasculitis treated with Rituxan last 3/2023. (We elected to hold due to infections, but insurance was beginning to deny auth on RTX)   HCQ 200mg BID   Evoxac 30mg TID   Do not send to [facility] we prefer [facility].    Tramadol PRN    Prednisone 5mg daily, may increase to 10 PRN flares.    Hep and T. Spot neg 2020  Holding on RTX to complete todays visit and labs then can schedule. Last 3/2023  Failed MTX 6/2023 x 2 weeks with malaise and nausea.   Will check Cryos 11/2023.     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 General vaccine and infection protections. Duration of RTX and expected level of current degree of immunosuppression.         Follow up in about 4 months (around 1/25/2024).      40min consultation with mercedes  than 50% of that time included Preparing to see the patient (review records, tests), Obtaining and/or reviewing separately obtained historical data, Performing a medically appropriate examination and/or evaluation , Ordering medications, tests, and/or procedures, Referring and communicating with other healthcare professionals , Documenting clinical information in the electronic or other health record and Independently interpreting results  (as warranted) & communicating results to the patient/family/caregiver. All questions answered.    Thank you for allowing me to participate in the care of this very pleasant patient.        The patient location is: Home [place]  The chief complaint leading to consultation is: medication management and f/u   Visit type: Virtual visit with synchronous audio and video  Total time spent with patient: 40min  Each patient to whom he or she provides medical services by telemedicine is:  (1) informed of the relationship between the physician and patient and the respective role of any other health care provider with respect to management of the patient; and (2) notified that he or she may decline to receive medical services by telemedicine and may withdraw from such care at any time.    Notes:

## 2023-10-02 ENCOUNTER — HOSPITAL ENCOUNTER (OUTPATIENT)
Dept: RADIOLOGY | Facility: HOSPITAL | Age: 43
Discharge: HOME | End: 2023-10-02
Attending: INTERNAL MEDICINE
Payer: COMMERCIAL

## 2023-10-02 DIAGNOSIS — Z12.31 OTHER SCREENING MAMMOGRAM: ICD-10-CM

## 2023-10-02 PROCEDURE — 77067 SCR MAMMO BI INCL CAD: CPT | Mod: 26,,, | Performed by: RADIOLOGY

## 2023-10-02 PROCEDURE — 77067 MAMMO DIGITAL SCREENING BILAT WITH TOMO: ICD-10-PCS | Mod: 26,,, | Performed by: RADIOLOGY

## 2023-10-02 PROCEDURE — 77067 SCR MAMMO BI INCL CAD: CPT | Mod: TC,PO

## 2023-10-02 PROCEDURE — 77063 BREAST TOMOSYNTHESIS BI: CPT | Mod: 26,,, | Performed by: RADIOLOGY

## 2023-10-02 PROCEDURE — 77063 MAMMO DIGITAL SCREENING BILAT WITH TOMO: ICD-10-PCS | Mod: 26,,, | Performed by: RADIOLOGY

## 2023-10-06 ENCOUNTER — TELEPHONE (OUTPATIENT)
Dept: NEUROLOGY | Facility: CLINIC | Age: 43
End: 2023-10-06
Payer: COMMERCIAL

## 2023-11-06 ENCOUNTER — LAB VISIT (OUTPATIENT)
Dept: LAB | Facility: HOSPITAL | Age: 43
End: 2023-11-06
Attending: INTERNAL MEDICINE
Payer: COMMERCIAL

## 2023-11-06 DIAGNOSIS — D89.1 CRYOGLOBULINEMIC VASCULITIS: ICD-10-CM

## 2023-11-06 DIAGNOSIS — M35.00 SJOGREN'S SYNDROME WITHOUT EXTRAGLANDULAR INVOLVEMENT: ICD-10-CM

## 2023-11-06 LAB
ALBUMIN SERPL BCP-MCNC: 3.8 G/DL (ref 3.5–5.2)
ALP SERPL-CCNC: 55 U/L (ref 55–135)
ALT SERPL W/O P-5'-P-CCNC: 13 U/L (ref 10–44)
ANION GAP SERPL CALC-SCNC: 6 MMOL/L (ref 8–16)
AST SERPL-CCNC: 14 U/L (ref 10–40)
BASOPHILS # BLD AUTO: 0.04 K/UL (ref 0–0.2)
BASOPHILS NFR BLD: 0.7 % (ref 0–1.9)
BILIRUB SERPL-MCNC: 0.1 MG/DL (ref 0.1–1)
BUN SERPL-MCNC: 14 MG/DL (ref 6–20)
CALCIUM SERPL-MCNC: 9.7 MG/DL (ref 8.7–10.5)
CHLORIDE SERPL-SCNC: 108 MMOL/L (ref 95–110)
CO2 SERPL-SCNC: 25 MMOL/L (ref 23–29)
CREAT SERPL-MCNC: 0.8 MG/DL (ref 0.5–1.4)
CRP SERPL-MCNC: 2.3 MG/L (ref 0–8.2)
DIFFERENTIAL METHOD: ABNORMAL
EOSINOPHIL # BLD AUTO: 0.3 K/UL (ref 0–0.5)
EOSINOPHIL NFR BLD: 5.5 % (ref 0–8)
ERYTHROCYTE [DISTWIDTH] IN BLOOD BY AUTOMATED COUNT: 13.6 % (ref 11.5–14.5)
ERYTHROCYTE [SEDIMENTATION RATE] IN BLOOD BY PHOTOMETRIC METHOD: 8 MM/HR (ref 0–36)
EST. GFR  (NO RACE VARIABLE): >60 ML/MIN/1.73 M^2
GLUCOSE SERPL-MCNC: 95 MG/DL (ref 70–110)
HCT VFR BLD AUTO: 40.3 % (ref 37–48.5)
HGB BLD-MCNC: 13 G/DL (ref 12–16)
IMM GRANULOCYTES # BLD AUTO: 0.02 K/UL (ref 0–0.04)
IMM GRANULOCYTES NFR BLD AUTO: 0.4 % (ref 0–0.5)
LYMPHOCYTES # BLD AUTO: 1.4 K/UL (ref 1–4.8)
LYMPHOCYTES NFR BLD: 25.9 % (ref 18–48)
MCH RBC QN AUTO: 31.6 PG (ref 27–31)
MCHC RBC AUTO-ENTMCNC: 32.3 G/DL (ref 32–36)
MCV RBC AUTO: 98 FL (ref 82–98)
MONOCYTES # BLD AUTO: 0.8 K/UL (ref 0.3–1)
MONOCYTES NFR BLD: 15.1 % (ref 4–15)
NEUTROPHILS # BLD AUTO: 2.9 K/UL (ref 1.8–7.7)
NEUTROPHILS NFR BLD: 52.4 % (ref 38–73)
NRBC BLD-RTO: 0 /100 WBC
PLATELET # BLD AUTO: 276 K/UL (ref 150–450)
PMV BLD AUTO: 11.7 FL (ref 9.2–12.9)
POTASSIUM SERPL-SCNC: 4.2 MMOL/L (ref 3.5–5.1)
PROT SERPL-MCNC: 7.4 G/DL (ref 6–8.4)
RBC # BLD AUTO: 4.12 M/UL (ref 4–5.4)
SODIUM SERPL-SCNC: 139 MMOL/L (ref 136–145)
WBC # BLD AUTO: 5.48 K/UL (ref 3.9–12.7)

## 2023-11-06 PROCEDURE — 36415 COLL VENOUS BLD VENIPUNCTURE: CPT | Mod: PO | Performed by: INTERNAL MEDICINE

## 2023-11-06 PROCEDURE — 86140 C-REACTIVE PROTEIN: CPT | Performed by: INTERNAL MEDICINE

## 2023-11-06 PROCEDURE — 85652 RBC SED RATE AUTOMATED: CPT | Performed by: INTERNAL MEDICINE

## 2023-11-06 PROCEDURE — 85025 COMPLETE CBC W/AUTO DIFF WBC: CPT | Performed by: INTERNAL MEDICINE

## 2023-11-06 PROCEDURE — 80053 COMPREHEN METABOLIC PANEL: CPT | Performed by: INTERNAL MEDICINE

## 2023-11-06 PROCEDURE — 82595 ASSAY OF CRYOGLOBULIN: CPT | Performed by: INTERNAL MEDICINE

## 2023-11-08 ENCOUNTER — OFFICE VISIT (OUTPATIENT)
Dept: OTOLARYNGOLOGY | Facility: CLINIC | Age: 43
End: 2023-11-08
Payer: COMMERCIAL

## 2023-11-08 VITALS
SYSTOLIC BLOOD PRESSURE: 129 MMHG | DIASTOLIC BLOOD PRESSURE: 87 MMHG | WEIGHT: 129.63 LBS | HEART RATE: 106 BPM | RESPIRATION RATE: 18 BRPM | BODY MASS INDEX: 20.92 KG/M2

## 2023-11-08 DIAGNOSIS — Q30.9 NASAL DEFORMITY, CONGENITAL: Primary | ICD-10-CM

## 2023-11-08 DIAGNOSIS — M95.0 NASAL VALVE COLLAPSE: ICD-10-CM

## 2023-11-08 PROCEDURE — 3008F PR BODY MASS INDEX (BMI) DOCUMENTED: ICD-10-PCS | Mod: CPTII,S$GLB,, | Performed by: OTOLARYNGOLOGY

## 2023-11-08 PROCEDURE — 3079F DIAST BP 80-89 MM HG: CPT | Mod: CPTII,S$GLB,, | Performed by: OTOLARYNGOLOGY

## 2023-11-08 PROCEDURE — 1160F RVW MEDS BY RX/DR IN RCRD: CPT | Mod: CPTII,S$GLB,, | Performed by: OTOLARYNGOLOGY

## 2023-11-08 PROCEDURE — 3074F SYST BP LT 130 MM HG: CPT | Mod: CPTII,S$GLB,, | Performed by: OTOLARYNGOLOGY

## 2023-11-08 PROCEDURE — 99999 PR PBB SHADOW E&M-EST. PATIENT-LVL V: CPT | Mod: PBBFAC,,, | Performed by: OTOLARYNGOLOGY

## 2023-11-08 PROCEDURE — 3074F PR MOST RECENT SYSTOLIC BLOOD PRESSURE < 130 MM HG: ICD-10-PCS | Mod: CPTII,S$GLB,, | Performed by: OTOLARYNGOLOGY

## 2023-11-08 PROCEDURE — 99999 PR PBB SHADOW E&M-EST. PATIENT-LVL V: ICD-10-PCS | Mod: PBBFAC,,, | Performed by: OTOLARYNGOLOGY

## 2023-11-08 PROCEDURE — 99204 PR OFFICE/OUTPT VISIT, NEW, LEVL IV, 45-59 MIN: ICD-10-PCS | Mod: S$GLB,,, | Performed by: OTOLARYNGOLOGY

## 2023-11-08 PROCEDURE — 3079F PR MOST RECENT DIASTOLIC BLOOD PRESSURE 80-89 MM HG: ICD-10-PCS | Mod: CPTII,S$GLB,, | Performed by: OTOLARYNGOLOGY

## 2023-11-08 PROCEDURE — 1160F PR REVIEW ALL MEDS BY PRESCRIBER/CLIN PHARMACIST DOCUMENTED: ICD-10-PCS | Mod: CPTII,S$GLB,, | Performed by: OTOLARYNGOLOGY

## 2023-11-08 PROCEDURE — 1159F MED LIST DOCD IN RCRD: CPT | Mod: CPTII,S$GLB,, | Performed by: OTOLARYNGOLOGY

## 2023-11-08 PROCEDURE — 99204 OFFICE O/P NEW MOD 45 MIN: CPT | Mod: S$GLB,,, | Performed by: OTOLARYNGOLOGY

## 2023-11-08 PROCEDURE — 1159F PR MEDICATION LIST DOCUMENTED IN MEDICAL RECORD: ICD-10-PCS | Mod: CPTII,S$GLB,, | Performed by: OTOLARYNGOLOGY

## 2023-11-08 PROCEDURE — 3008F BODY MASS INDEX DOCD: CPT | Mod: CPTII,S$GLB,, | Performed by: OTOLARYNGOLOGY
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
## 2023-11-16 LAB — CRYOGLOB SER QL: ABNORMAL

## 2023-12-18 ENCOUNTER — PATIENT MESSAGE (OUTPATIENT)
Dept: NEUROLOGY | Facility: CLINIC | Age: 43
End: 2023-12-18

## 2023-12-18 ENCOUNTER — OFFICE VISIT (OUTPATIENT)
Dept: NEUROLOGY | Facility: CLINIC | Age: 43
End: 2023-12-18
Payer: COMMERCIAL

## 2023-12-18 VITALS
HEART RATE: 108 BPM | DIASTOLIC BLOOD PRESSURE: 81 MMHG | TEMPERATURE: 98 F | RESPIRATION RATE: 17 BRPM | BODY MASS INDEX: 21.57 KG/M2 | SYSTOLIC BLOOD PRESSURE: 138 MMHG | WEIGHT: 134.25 LBS | HEIGHT: 66 IN

## 2023-12-18 DIAGNOSIS — R51.9 WORSENING HEADACHES: ICD-10-CM

## 2023-12-18 DIAGNOSIS — G43.709 CHRONIC MIGRAINE WITHOUT AURA WITHOUT STATUS MIGRAINOSUS, NOT INTRACTABLE: Primary | ICD-10-CM

## 2023-12-18 PROCEDURE — 3075F SYST BP GE 130 - 139MM HG: CPT | Mod: CPTII,S$GLB,, | Performed by: PHYSICIAN ASSISTANT

## 2023-12-18 PROCEDURE — 99999 PR PBB SHADOW E&M-EST. PATIENT-LVL V: ICD-10-PCS | Mod: PBBFAC,,, | Performed by: PHYSICIAN ASSISTANT

## 2023-12-18 PROCEDURE — 3075F PR MOST RECENT SYSTOLIC BLOOD PRESS GE 130-139MM HG: ICD-10-PCS | Mod: CPTII,S$GLB,, | Performed by: PHYSICIAN ASSISTANT

## 2023-12-18 PROCEDURE — 3008F BODY MASS INDEX DOCD: CPT | Mod: CPTII,S$GLB,, | Performed by: PHYSICIAN ASSISTANT

## 2023-12-18 PROCEDURE — 3079F PR MOST RECENT DIASTOLIC BLOOD PRESSURE 80-89 MM HG: ICD-10-PCS | Mod: CPTII,S$GLB,, | Performed by: PHYSICIAN ASSISTANT

## 2023-12-18 PROCEDURE — 1159F MED LIST DOCD IN RCRD: CPT | Mod: CPTII,S$GLB,, | Performed by: PHYSICIAN ASSISTANT

## 2023-12-18 PROCEDURE — 1159F PR MEDICATION LIST DOCUMENTED IN MEDICAL RECORD: ICD-10-PCS | Mod: CPTII,S$GLB,, | Performed by: PHYSICIAN ASSISTANT

## 2023-12-18 PROCEDURE — 3008F PR BODY MASS INDEX (BMI) DOCUMENTED: ICD-10-PCS | Mod: CPTII,S$GLB,, | Performed by: PHYSICIAN ASSISTANT

## 2023-12-18 PROCEDURE — 99214 OFFICE O/P EST MOD 30 MIN: CPT | Mod: S$GLB,,, | Performed by: PHYSICIAN ASSISTANT

## 2023-12-18 PROCEDURE — 99999 PR PBB SHADOW E&M-EST. PATIENT-LVL V: CPT | Mod: PBBFAC,,, | Performed by: PHYSICIAN ASSISTANT

## 2023-12-18 PROCEDURE — 3079F DIAST BP 80-89 MM HG: CPT | Mod: CPTII,S$GLB,, | Performed by: PHYSICIAN ASSISTANT

## 2023-12-18 PROCEDURE — 1160F PR REVIEW ALL MEDS BY PRESCRIBER/CLIN PHARMACIST DOCUMENTED: ICD-10-PCS | Mod: CPTII,S$GLB,, | Performed by: PHYSICIAN ASSISTANT

## 2023-12-18 PROCEDURE — 1160F RVW MEDS BY RX/DR IN RCRD: CPT | Mod: CPTII,S$GLB,, | Performed by: PHYSICIAN ASSISTANT

## 2023-12-18 PROCEDURE — 99214 PR OFFICE/OUTPT VISIT, EST, LEVL IV, 30-39 MIN: ICD-10-PCS | Mod: S$GLB,,, | Performed by: PHYSICIAN ASSISTANT

## 2023-12-18 RX ORDER — RIMEGEPANT SULFATE 75 MG/75MG
TABLET, ORALLY DISINTEGRATING ORAL
Qty: 15 TABLET | Refills: 6 | Status: SHIPPED | OUTPATIENT
Start: 2023-12-18

## 2023-12-18 RX ORDER — DICLOFENAC SODIUM 75 MG/1
TABLET, DELAYED RELEASE ORAL
Qty: 20 TABLET | Refills: 5 | Status: SHIPPED | OUTPATIENT
Start: 2023-12-18

## 2023-12-18 NOTE — PROGRESS NOTES
[facility]  [address] [facility] [address]  [place] [address]  Phone:[phone]  Fax: [phone]   Follow up visit        Patient Name: Shamika Young  : 1980  MRN:  [id number]  Today: 2023   LOV: 2022  chief complaint: Headache      PCP: Julia Sargent MD.    Assessment:   Shamika Young is a 42 y.o. 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 HA have acutely worsened and now appearing chronic. As there has been a change in quality/nature to the HA, will get updated imaging studies. Noted, she has failed multiple treatments.     Review:    ICD-10-CM ICD-9-CM   1. Chronic migraine without aura without status migrainosus, not intractable  G43.709 346.70   2. Worsening headaches  R51.9 784.0               Plan:    MRI brain +/- contrast and pre procedure labs, discussed at length, I will comment on findings electronically, she voiced agreement     For HA Prevention:  1)  stop qulipta  2) try nurtec 1 pill every other day as prophylaxis  3) Patient meets the criteria for chronic migraine. In summary, She has  migraines >15 days per month  and last >4 hours if untreated. Specifics of duration, frequency and strength are listed in the HPI (please refer to this section).  This pattern has continued for >3 months.  She has failed at least three preventive medications (full list of medications is listed below in the HPI under Therapies tried in past, but for ease of reference Vyvanse, wellbutrin, buspar, depakote, tizanidine, gabapentin, lyrica, qulipta, emgality, buspar, etc )  I have therefore recommended a trial of Botox via the PREEMPT protocol for migraine prophylaxis.   We discussed what to expect on procedure day at length, wear old or loose fitting clothing (if possible, merely to keep work clothes from getting any  blood or being wrinkled), no make up (if applicable), eat meals/stay well hydrated and secure a ride if necessary. Also, discussed it can take up to 2-3 sessions of botox to get results desired. Lastly, we discussed procedure at length, 31 injections done in the office, potential complications not limited to muscle weakness, respiratory issues, or worst case scenario-death. The patient voiced understanding and wished to move forward.  Muscles to be injected:   10 units divided in 2 sites  Procerus 5 units in 1 site  Frontalis 20 units divided in 4 sites  Temporalis 40 units divided in 8 sites  Occipitalis 30 units divided in 6 sites  Cervical paraspinal 20 units divided in 4 sites  Trapezius 30 units divided in 6 sites  A total dose of 155 units of botox to be used with  45 units to be discarded/wasted (unavoidable).    4) mood medicines per other members of team          For HA :  Stop nurtec  Try diclofenac, discussed adv effects/take with food, she agreed     To break up Headaches:  Can consider nerve blocks again        Other:  N/A           All test results as well as any necessary instructions were reviewed and discussed with patient.    Review:  Orders Placed This Encounter    MRI Brain W WO Contrast    Comprehensive metabolic panel    Pregnancy Test Screening, Serum    Prior authorization Order    diclofenac (VOLTAREN) 75 MG EC tablet    rimegepant (NURTEC) 75 mg odt               Patient to return to PCP/other specialists for all other problems  Patient to continue on all medications as Rx'd  Full office note available online   RTO-for botox 1   The patient indicates understanding of these issues and agrees to the plan.    HPI:   Shamika Young is a 42 y.o. 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     At last visit: qulipta, PT and nurtec.  "  Has been on nerivio with some success  24 HD in past month, most migrainous, has been light headed/feeling sea sick   Pain in frontalis/vertex and back of the head  Has HA now  No current GIB when asked   States she has some stuttering/difficulty getting words out when she has a bad migraine   Has had some twitching frontalis   Qulipta causing side effects/constipation   Would like to make medication changes         From previous visit:: stopped emgality, qulipta started instead. PT ordered, lyrica 25 mg BID ordered. Mood meds per other providers. Nurtec available. I also referred to back and spine.   + constipation with qulipta but  ultimately feeling better and doesn't want to make changes .   3HD/30   Getting some menstrual migraine   Hasn't had to take nurtec  Self stopped lyrica   Going to chiropractor and getting massage   She is pending PT   Did not want to make any changes  Back again starts in back of head and radiates to around the eyes       From previous visits::emgality continued, mood medicines per other providers, nurtec for HA  and GONB/TPI given.   3-4 HD a week in past month, historically was a lot less, no trigger idenfitied   Pain starts in back of the head and radiates around to the eyes   Has had some weakness in her right hand has noticed some discomfort in her neck  Has been off emgality for a few months, "I couldn't remember to take it"  Using tramadol daily for past month d/t neck pain  Feels the pain is making her "turn her head to the left"  No dysphagia  No incontinence  No falls  No recent trauma   Has weakness in the arm on right though pain more in LEFT neck/occiput        From previous visit: had emgality, nurtec and zanaflex.  10 HD a month  Had mild-moderate HA now  Pain in occipitalis and trapezius  + photophobia  Zanaflex not helping/stopped taking it  Nurtec does help  Emgality is helping   Chocolate and menses are a trigger   "I really have benefit from TPI, can I " "please have one?"     From previous visits:wrote for emgality and nurtec.   HA weather related (?) and sleep deprivation "I stay up late with my kids watching television"  Emgality helping   Stopped taking BC  Pain along frontalis  nurtec is hit/miss  8 HD in past month  Severity: 4-8/10  HA last: 12-15 hours     HA HPI:  Start:Since HS, took triptans, and seemed to aide, and in 2021 returned  (frequency), started new BC about a month and noticed frequency intensified   History of trauma (no), History of CNS infection (no), History of Stroke (no)  Location:frontalis and retroorbital and more to the top of the head, occasionally down the cheek (right)  Severity: range: 3-6/10  Duration:8-12 hours in a day   Frequency:12-15 HD a month   Associated factors (bolded positive) WITH HA  (or migraine): Nausea, vomiting, photophobia, phonophobia, tinnitus, scalp pain, vision loss, diplopia, scintillations, eye pain, jaw pain, weakness?    Tried:maxalt, no preventative   Triggers (in bold): stress, lack of sleep, too much caffeine(soda and coffee), too little caffeine, weather change, seasonal change, strong odours, bright lights, sunlight, food  Last HA: yesterday  Positives in bold: Hx of Kidney Stones, asthma (as a child), GI bleed, osteoporosis, CAD/MI, CVA/TIA, DM    Imaging on file:3/29/2023 MRI C spine  Therapies tried in past: (failures to be marked, if known---why did it fail?)  vyvanse  wellbutrin  maxalt  Prednisone   buspar  zofran  imitrex  depakote  Tizandine  Gabapentin -didn't tolerate  Lyrica  Qulipta  GONB/TPI  Nurtec   Emgality   Tramadol   Buspar  Toradol     Medication Reconciliation:   Current Outpatient Medications   Medication Sig Dispense Refill    albuterol (VENTOLIN HFA) 90 mcg/actuation inhaler Inhale 2 puffs into the lungs every 4 (four) hours as needed for Wheezing or Shortness of Breath. 18 g 0    atogepant (QULIPTA) 60 mg Tab Take 1 tablet by mouth daily 30 tablet 6    azelastine (ASTELIN) 137 " mcg (0.1 %) nasal spray 1 spray (137 mcg total) by Nasal route 2 (two) times daily. 30 mL 0    buPROPion (WELLBUTRIN) 75 MG tablet Take 75 mg by mouth once daily.      busPIRone (BUSPAR) 10 MG tablet Take 10 mg by mouth 3 (three) times daily.      cevimeline (EVOXAC) 30 mg capsule Take 1 capsule (30 mg total) by mouth 3 (three) times daily. 270 capsule 3    CHOLINE ORAL Take by mouth.      ergocalciferol, vitamin D2, (VITAMIN D ORAL) Take by mouth.      hydrOXYchloroQUINE (PLAQUENIL) 200 mg tablet Take 1 tablet (200 mg total) by mouth 2 (two) times a day. 60 tablet 11    Lactobac no.41/Bifidobact no.7 (PROBIOTIC-10 ORAL) Take by mouth. bromoline      LORazepam (ATIVAN) 1 MG tablet Take 1 mg by mouth daily as needed.      methylphenidate HCl (RITALIN) 20 MG tablet Take 20 mg by mouth once daily.      montelukast (SINGULAIR) 10 mg tablet Take 1 tablet (10 mg total) by mouth every evening. 30 tablet 0    NURTEC 75 mg odt Take 75 mg by mouth once as needed.      promethazine-dextromethorphan (PROMETHAZINE-DM) 6.25-15 mg/5 mL Syrp Take 5 mLs by mouth every 6 (six) hours as needed (cough). 118 mL 0    thymol/chlorophyllin (CHLOROPHYLL ORAL) Take by mouth.      traMADoL (ULTRAM) 50 mg tablet Take 1 tablet (50 mg total) by mouth every 8 (eight) hours as needed for Pain (joint pains dx Primary Sjogrens with vasculitis.). 40 tablet 3    TYROSINE ORAL Take by mouth.      VYVANSE 30 mg capsule Take 30 mg by mouth nightly.       No current facility-administered medications for this visit.     Review of patient's allergies indicates:   Allergen Reactions    Tree nut Anaphylaxis    Tree nuts Anaphylaxis    Biaxin [clarithromycin] Other (See Comments)     GI symptoms     Erythromycin      Unknown    Azithromycin Rash    Ceclor [cefaclor] Rash    Ciprofloxacin Rash    Penicillins Rash    Tetracyclines Hives       PMHx:  Past Medical History:   Diagnosis Date    Allergy     previously on immunotherapy    Anxiety     Depression      Headache     Lumbago with sciatica, right side     Lumbar disc disease     L4-L5    Other chronic pain     Panic disorder     Positive CHELSEA (antinuclear antibody)     Purpura     Sjogren's syndrome     Vasculitis      Past Surgical History:   Procedure Laterality Date    CYST REMOVAL  2012    mandibular    HYSTEROSCOPY N/A 7/17/2020    Procedure: HYSTEROSCOPY/ with IUD removal;  Surgeon: Cherry Christine MD;  Location: [facility];  Service: OB/GYN;  Laterality: N/A;    INTRAUTERINE DEVICE INSERTION  7/17/2020    Procedure: INSERTION, INTRAUTERINE DEVICE;  Surgeon: Cherry Christine MD;  Location: [facility];  Service: OB/GYN;;    lumbar spine procedure      2004/2014    REMOVAL OF INTRAUTERINE DEVICE (IUD)  7/17/2020    Procedure: REMOVAL, INTRAUTERINE DEVICE;  Surgeon: Cherry Christine MD;  Location: [facility];  Service: OB/GYN;;    TONSILLECTOMY AND ADENOIDECTOMY      7th grade       Fhx:  Family History   Problem Relation Age of Onset    Multiple sclerosis Mother     Heart disease Mother     Diabetes Mother     Hypertension Mother     Heart disease Father     Hyperlipidemia Father     Glaucoma Neg Hx     Retinal detachment Neg Hx     Macular degeneration Neg Hx        Shx:   Social History     Socioeconomic History    Marital status:    Tobacco Use    Smoking status: Former    Smokeless tobacco: Never   Substance and Sexual Activity    Alcohol use: Yes     Comment: socially    Drug use: Never           Labs:   Reviewed in chart     Imaging:   3/29/2023 MRI C spine (report): Vertebral column: There is straightening of the cervical spine with loss of normal lordosis.  This is nonspecific but likely positional and/or degenerative in etiology.  The vertebral bodies maintain normal height.  There is no fracture.  Alignment is grossly normal.  There is moderate disc space narrowing at the C6-7 level with at least mild disc space narrowing at the C5-6 level.  There is endplate osteophyte formation at both of these  levels.  There is mixed degenerative endplate signal change mostly anteriorly at the C6-7 level.  There is subtle superior endplate irregularity of C7 which could be related to a reactive Schmorl's node.  Baseline marrow signal is normal.  The discs are desiccated.  The odontoid process is intact.     Spinal canal, cord, epidural space: The spinal canal may be borderline small on a developmental basis.  There is mild flattening of the cord surface at several levels but cord signal is normal without edema or myelomalacia.  There is no abnormal epidural mass or fluid collection.     Findings by level:     C2-3: There is no spinal canal or significant foraminal stenosis.     C3-4: There is mild-to-moderate left foraminal stenosis due to uncovertebral spurring and mild facet joint arthropathy.  There is also mild disc osteophyte complex which narrows the subarachnoid space and results in minimal flattening of the ventral cord surface and mild spinal stenosis.     C4-5: There is moderate left foraminal stenosis due to uncovertebral spurring and facet joint arthropathy.  Again, there is a mild disc osteophyte complex, slightly eccentric to the left contributing to borderline to mild spinal stenosis and crowding of the left lateral recess.     C5-6: There is at least mild disc space narrowing.  There is bilateral uncovertebral spurring.  There is a broad disc protrusion/osteophyte eccentric to the right resulting in very subtle flattening of the right ventral cord surface.  There is mild spinal stenosis.  There is mild-to-moderate bilateral foraminal stenosis.     C6-7: There is moderate disc space narrowing.  There is a broad disc protrusion eccentric to the left.  There is right greater than left uncovertebral spurring with mild bilateral facet joint arthropathy.  There is mild-to-moderate spinal stenosis with moderate right and mild left foraminal stenosis.     C7-T1: There is bilateral facet joint arthropathy.  There  "is a minimal disc bulge.  There is no spinal stenosis.  There is only mild bilateral foraminal stenosis.     There are small disc protrusions at both the T1-2 and T2-3 levels.  There is mild spinal stenosis without acute cord compression at the T2-3 level.     Soft tissues, other: The prevertebral soft tissues are grossly normal.  The airway is patent.     Impression:     1. There is multilevel degenerative change discussed in detail by level above.  The findings are present in the setting of a spinal canal which may be borderline small on a developmental basis.  There is some degree of spinal canal and foraminal stenosis at multiple levels.  Also, there is subtle flattening of the cord surface at several levels due to these changes but there is no evidence of cord edema or myelomalacia.  Please see above discussion.            Other testing:  Reviewed in chart     Note: I have independently reviewed any/all imaging/labs/tests and agree with the report (s) as documented.  Any discrepancies will be as noted/demarcated by free text.  AUGIE MG 5/2/2023                     ROS:   Review Of Systems (questions asked, positive or additions in BOLD)  Gen: Weight change, fatigue/malaise, pyrexia   HEENT: Tinnitus, headache,  blurred vision, eye pain, diplopia, photophobia,  nose bleeds, congestion, sore throat, jaw pain, scalp pain, neck stiffness   Card: Palpitations, CP   Pulm: SOB, cough   Vas: Easy bruising, easy bleeding   GI: N/V/D/C, incontinence, hematemesis, hematochezia    : incontinence, hematuria   Endocrine: Temp intolerance, polyuria, polydipsia   M/S: Neck pain, myalgia, back pain, joint pain, falls    Neuro: PER HPI   PSY: Memory loss, confusion, depression, anxiety, trouble in sleep          EXAM:      /81 (BP Location: Right arm, Patient Position: Sitting, BP Method: Medium (Automatic))   Pulse 108   Temp 97.8 °F (36.6 °C)   Resp 17   Ht 5' 6" (1.676 m)   Wt 60.9 kg (134 lb 4.2 oz)   LMP " 12/07/2023 (Exact Date)   BMI 21.67 kg/m²      GEN:  appears in discomfort but not in distress   HEENT: NC/AT,  NTTP frontalis/temporalis, trapezius and occipitalis mildly TTP, no procerus tremor noted   EXTREM:    no edema present.    NEURO:  Mental Status:  Awake, alert and appropriately oriented to time, place, and person.  Normal attention and concentration.  Speech is fluent and appropriate language function (I.e., comprehension, repetition, etc).     Cranial Nerves:    Extraocular movements are intact and without nystagmus.      Facial movement is symmetric.   Hearing appears intact.  Uvula in midline. DROM of neck in all (6) directions, shoulder shrug symmetrical. Tongue in midline without fasiculation.     Motor:  antitgravity bilat UE   5/5 strength bilat UE/LE  Tremor/pronator drift not apparent.      DTR:1+ patellar RLE, 2+ patellar LLE (note addendum for completeness). SMJ     Gait and Stance: sit to stand without assistance         This document has been electronically signed by Mr. Eduar Serra MPA, PA-C on 5/2/2023, I have personally typed this message using the EMR.       Dr Daniel MD was available during today's visit.       CC: Julia Sargent MD

## 2023-12-18 NOTE — PATIENT INSTRUCTIONS
Stop qulipta  Try nurtec 1 pill every other day     Use the diclofenac 1 pill at onset migraine, can use 1 in 24 hour period, take with food      Botox for migraines ordered    Consider the cefaly device, www.cefaly.us, please research, this is TENs unit designed in [place] and was FDA approved in the early 2010s for migraines.

## 2023-12-28 ENCOUNTER — HOSPITAL ENCOUNTER (OUTPATIENT)
Dept: RADIOLOGY | Facility: HOSPITAL | Age: 43
Discharge: HOME | End: 2023-12-28
Attending: PHYSICIAN ASSISTANT
Payer: COMMERCIAL

## 2023-12-28 DIAGNOSIS — R51.9 WORSENING HEADACHES: ICD-10-CM

## 2023-12-28 DIAGNOSIS — G43.709 CHRONIC MIGRAINE WITHOUT AURA WITHOUT STATUS MIGRAINOSUS, NOT INTRACTABLE: ICD-10-CM

## 2023-12-28 PROCEDURE — 70553 MRI BRAIN STEM W/O & W/DYE: CPT | Mod: 26,,, | Performed by: RADIOLOGY

## 2023-12-28 PROCEDURE — 25500020 PHARM REV CODE 255: Mod: PO | Performed by: PHYSICIAN ASSISTANT

## 2023-12-28 PROCEDURE — 70553 MRI BRAIN W WO CONTRAST: ICD-10-PCS | Mod: 26,,, | Performed by: RADIOLOGY

## 2023-12-28 PROCEDURE — 70553 MRI BRAIN STEM W/O & W/DYE: CPT | Mod: TC,PO

## 2023-12-28 PROCEDURE — A9585 GADOBUTROL INJECTION: HCPCS | Mod: PO | Performed by: PHYSICIAN ASSISTANT

## 2023-12-28 RX ORDER — PREDNISONE 5 MG/1
TABLET ORAL
Qty: 75 TABLET | Refills: 3 | Status: SHIPPED | OUTPATIENT
Start: 2023-12-28

## 2023-12-28 RX ORDER — GADOBUTROL 604.72 MG/ML
6 INJECTION INTRAVENOUS
Status: COMPLETED | OUTPATIENT
Start: 2023-12-28 | End: 2023-12-28

## 2023-12-28 RX ADMIN — GADOBUTROL 6 ML: 604.72 INJECTION INTRAVENOUS at 04:12

## 2023-12-29 ENCOUNTER — PATIENT MESSAGE (OUTPATIENT)
Dept: RHEUMATOLOGY | Facility: CLINIC | Age: 43
End: 2023-12-29
Payer: COMMERCIAL

## 2024-01-09 ENCOUNTER — PATIENT MESSAGE (OUTPATIENT)
Dept: RHEUMATOLOGY | Facility: CLINIC | Age: 44
End: 2024-01-09

## 2024-01-09 ENCOUNTER — OFFICE VISIT (OUTPATIENT)
Dept: RHEUMATOLOGY | Facility: CLINIC | Age: 44
End: 2024-01-09
Payer: COMMERCIAL

## 2024-01-09 ENCOUNTER — PATIENT MESSAGE (OUTPATIENT)
Dept: RHEUMATOLOGY | Facility: CLINIC | Age: 44
End: 2024-01-09
Payer: COMMERCIAL

## 2024-01-09 ENCOUNTER — TELEPHONE (OUTPATIENT)
Dept: RHEUMATOLOGY | Facility: CLINIC | Age: 44
End: 2024-01-09

## 2024-01-09 DIAGNOSIS — L50.9 URTICARIA: ICD-10-CM

## 2024-01-09 DIAGNOSIS — I73.00 RAYNAUD'S PHENOMENON WITHOUT GANGRENE: ICD-10-CM

## 2024-01-09 DIAGNOSIS — D89.1 CRYOGLOBULINEMIC VASCULITIS: Primary | ICD-10-CM

## 2024-01-09 DIAGNOSIS — M35.00 SJOGREN'S SYNDROME WITHOUT EXTRAGLANDULAR INVOLVEMENT: ICD-10-CM

## 2024-01-09 PROCEDURE — 1160F RVW MEDS BY RX/DR IN RCRD: CPT | Mod: CPTII,95,, | Performed by: INTERNAL MEDICINE

## 2024-01-09 PROCEDURE — 99214 OFFICE O/P EST MOD 30 MIN: CPT | Mod: 95,,, | Performed by: INTERNAL MEDICINE

## 2024-01-09 PROCEDURE — 1159F MED LIST DOCD IN RCRD: CPT | Mod: CPTII,95,, | Performed by: INTERNAL MEDICINE

## 2024-01-09 RX ORDER — CEVIMELINE HYDROCHLORIDE 30 MG/1
30 CAPSULE ORAL 3 TIMES DAILY
Qty: 90 CAPSULE | Refills: 11 | Status: SHIPPED | OUTPATIENT
Start: 2024-01-09

## 2024-01-09 RX ORDER — HYDROXYCHLOROQUINE SULFATE 200 MG/1
200 TABLET, FILM COATED ORAL 2 TIMES DAILY
Qty: 60 TABLET | Refills: 11 | Status: SHIPPED | OUTPATIENT
Start: 2024-01-09

## 2024-01-09 NOTE — PROGRESS NOTES
"  Subjective:          Chief Complaint: Shamika Young is a 43 y.o. female who had no chief complaint listed for this encounter.    HPI: Primary Sjogren's w/ cryoglobulinemic vasculitis.  (++RF, +SSA, +SSB, +CHELSEA)     Interval events:      1/2024: doing rather well considering winter weather. Some mottlling with red spots at times, no petechia that persist. No open sores. Renal function WNL.   Cryos 11/2023 were trace. Needs HCQ eye exam. Prefers evoxac to salagen. Working with [facility] avoiding triptans       9/2023: Patient doing "ok" having fatigue. Noting urticaria and petechia none that opened. Seems to flare with stress and improved now lasted about 5 days.  Legs only.  Last RTX was 3/2023.   We elected to hold on RTX   But did start her on MTX- she started MTX at 10mg x 2 weeks with malaise, nausea for about 3 days felt she would never tolerate this.   She is noting dryness of her eyes and mouth: evoxac still help. Salagen triggered.          6/2023:   Doing better with warm weather.   Fatigue continues w/ stress. She flares with travel. Flares with viral infections.   Continues: with mottleing but no petechial rash.   No numbness or tingling.    She notes some rashes with dusky changes at the knee exacerbates with stress.     Patient has been doing well overall since last RTX (Ruxience) 8/2022.   Patient started in mid Jan with urticarial rashes, livedo and petechia in bilateral LE. We elected to repeat Ruxience (RTX)- Last Ruxience (RTX) 3/13/2023  Pins in needles in forearm but this appears to be positional. Trying to adjust sleep and desk  + Fatigue as well.   Raynauds always worse in winter.   No SOB/LÓPEZ no hemoptysis.     Continues on    Rituxan periodically based on Cryos, symptoms and lab markers.  Last infusion   HCQ 200mg BID- last eye 2021. Dr. Glover no maculopathy will need to call.   Prednisone 5mg- only uses in pulse tapers (20-30mg few days)  Component      Latest Ref Rng & Units 5/8/2023 " 2/8/2023 2/8/2023 4/25/2022           12:06 PM 12:06 PM    Anti Sm Antibody      0.00 - 0.99 Ratio  0.12     Anti-Sm Interpretation      Negative  Negative     Anti-SSA Antibody      0.00 - 0.99 Ratio  3.94 (H) 3.94 (H)    Anti-SSA Interpretation      Negative  Positive (A) Positive (A)    Anti-SSB Antibody      0.00 - 0.99 Ratio  3.14 (H) 3.14 (H)    Anti-SSB Interpretation      Negative  Positive (A) Positive (A)    ds DNA Ab      Negative 1:10  Negative 1:10     Anti Sm/RNP Antibody      0.00 - 0.99 Ratio  0.22     Anti-Sm/RNP Interpretation      Negative  Negative     Rheumatoid Factor      0.0 - 15.0 IU/mL       Cryoglobulin, Qual      Absent   Absent Absent   CHELSEA Screen      Negative <1:80   Positive (A)    CHELSEA PATTERN 1         Speckled    CHELSEA Titer 1         1:640    Sed Rate      0 - 36 mm/Hr 5      CRP      0.0 - 8.2 mg/L 1.0        Component      Latest Ref Rng & Units 3/3/2020             Anti Sm Antibody      0.00 - 0.99 Ratio    Anti-Sm Interpretation      Negative    Anti-SSA Antibody      0.00 - 0.99 Ratio 4.67 (H)   Anti-SSA Interpretation      Negative Positive (A)   Anti-SSB Antibody      0.00 - 0.99 Ratio 2.40 (H)   Anti-SSB Interpretation      Negative Positive (A)   ds DNA Ab      Negative 1:10    Anti Sm/RNP Antibody      0.00 - 0.99 Ratio    Anti-Sm/RNP Interpretation      Negative    Rheumatoid Factor      0.0 - 15.0 IU/mL 484.0 (H)   Cryoglobulin, Qual      Absent    CHELSEA Screen      Negative <1:80    CHELSEA PATTERN 1          CHELSEA Titer 1          Sed Rate      0 - 36 mm/Hr    CRP      0.0 - 8.2 mg/L        Rheum Hx:   Patient is a 39-year-old female being referred by Dr. Chopra with hx of cryoglobulinemia vasculitis and Primary Sjogren's.    Patient seen at [facility]. Ultimately diagnosed [id number].  She states rash started during pregnancy of legs started with purpura of legs. She then developed more diffuse coalescing purpura but no open sores. No renal or respiratory disease to date.    Patient did have skin bx: LCV  Previously hypocomplementemic.   She did note fewer skin purpuric lesions after 2 cycles of RTX   She has noted more urticaria with wheal in the skin,  some reactions with food, wheat/rice seem to be worst. She notes no new soaps or detergents. Present since 2017.   She has seen allergist with some nut and environmental allergies no changes present since childhood. - working with DR. Curtis.   Dry eyes present but tolerates contacts.   Dry mouth more problematic.   Joints fluctuate between 6-9/10. Hand and wrist predominant.   No real Raynauds in triphasic pattern but cold hands and feet.   Patient c/o swelling of lips at times-no known food allergies.       She is noting more livedo and mottling in legs w/o activity, ++ fatigue increasing. She does note during flares some joint pains. 2-5 days every 2 weeks. Flares seem to be ocurring every 2 weeks.   She notes working from home has helped with overall fatigue/pain.   No numbness or tingling.     PFTs at [facility] were reportedly normal 7/2019 , no hemoptysis or SOB  Thinks she had CT chest.   No bone marrow bx.   No DVT, miscarriages, seizures or strokes.   No renal disease.        She is on hydroxychloroquine 200 mg twice daily.  Rituxan last dose 7/2019  cevimiline 30mg TID  Prednisone 10mg once daily PRN ( during flare daily rest is periodical.   Rituximab last dose 7/2019 (x 3 treatments 6 months apart. )     OTC:   Xylimelts.   Not using biotene.   sugarfree lemon  gylcerin lozenges.     Previous medications: Salagen with ASE with [facility].   No hx of hepatitis, no malignancy in self to date.   Component      Latest Ref Rng & Units 3/3/2020   Anti-SSA Antibody      0.00 - 0.99 Ratio 4.67 (H)   Anti-SSA Interpretation      Negative Positive (A)   Anti-SSB Antibody      0.00 - 0.99 Ratio 2.40 (H)   Anti-SSB Interpretation      Negative Positive (A)   Anti Sm Antibody      0.00 - 0.99 Ratio 0.07   Anti-Sm Interpretation      Negative  Negative   Anti Sm/RNP Antibody      0.00 - 0.99 Ratio 0.17   Anti-Sm/RNP Interpretation      Negative Negative   Rheumatoid Factor      0.0 - 15.0 IU/mL 484.0 (H)   CCP Antibodies      <5.0 U/mL <0.5   CHELSEA Screen      Negative <1:80 Positive (A)   ds DNA Ab      Negative 1:10 Negative 1:10   CHELSEA HEP-2 Titer       Positive >=1:2560 Speckled         REVIEW OF SYSTEMS:    Review of Systems   Constitutional:  Negative for fever, malaise/fatigue and weight loss.   HENT:  Negative for sore throat.    Eyes:  Positive for pain and redness. Negative for double vision and photophobia.   Respiratory:  Negative for cough, shortness of breath and wheezing.    Cardiovascular:  Negative for chest pain, palpitations and orthopnea.   Gastrointestinal:  Negative for abdominal pain, constipation and diarrhea.   Genitourinary:  Negative for dysuria, hematuria and urgency.   Musculoskeletal:  Positive for joint pain. Negative for back pain and myalgias.   Skin:  Positive for rash (cryos with petechia one episode).   Neurological:  Positive for headaches. Negative for dizziness, tingling and focal weakness.   Endo/Heme/Allergies:  Does not bruise/bleed easily.   Psychiatric/Behavioral:  Negative for depression, hallucinations and suicidal ideas.                Objective:            Past Medical History:   Diagnosis Date    Allergy     previously on immunotherapy    Anxiety     Bleeding disorder     Depression     Facial trauma     Food allergy     Headache     Lumbago with sciatica, right side     Lumbar disc disease     L4-L5    Migraine headache     Osteoarthritis     Other chronic pain     Panic disorder     Positive CHELSEA (antinuclear antibody)     Purpura     Seasonal allergies     Sjogren's syndrome     Vasculitis      Family History   Problem Relation Age of Onset    Multiple sclerosis Mother     Heart disease Mother     Diabetes Mother     Hypertension Mother     Heart failure Mother     Migraines Mother     Heart disease Father      Hyperlipidemia Father     Heart failure Father     Migraines Sister     Glaucoma Neg Hx     Retinal detachment Neg Hx     Macular degeneration Neg Hx      Social History     Tobacco Use    Smoking status: Former    Smokeless tobacco: Never   Substance Use Topics    Alcohol use: Yes     Alcohol/week: 2.0 standard drinks of alcohol     Types: 2 Drinks containing 0.5 oz of alcohol per week     Comment: socially    Drug use: Never         Current Outpatient Medications on File Prior to Visit   Medication Sig Dispense Refill    albuterol (PROVENTIL/VENTOLIN HFA) 90 mcg/actuation inhaler INHALE 2 PUFFS INTO THE LUNGS EVERY 4 HOURS AS NEEDED FOR WHEEZING OR SHORTNESS OF BREATH 18 g 4    azelastine (ASTELIN) 137 mcg (0.1 %) nasal spray 1 spray (137 mcg total) by Nasal route 2 (two) times daily. 30 mL 0    buPROPion (WELLBUTRIN) 75 MG tablet Take 75 mg by mouth once daily.      busPIRone (BUSPAR) 10 MG tablet Take 10 mg by mouth 3 (three) times daily.      cevimeline (EVOXAC) 30 mg capsule TAKE 1 CAPSULE BY MOUTH THREE TIMES DAILY 90 capsule 11    CHOLINE ORAL Take by mouth.      diclofenac (VOLTAREN) 75 MG EC tablet 1 pill in 24 hours as needed for migraine, take with food, no use more than 3 days in a week 20 tablet 5    EPINEPHrine (EPIPEN 2-CA) 0.3 mg/0.3 mL AtIn Inject 0.6 mLs (0.6 mg total) into the muscle once. for 1 dose 0.6 mL 0    ergocalciferol, vitamin D2, (VITAMIN D ORAL) Take by mouth.      hydrOXYchloroQUINE (PLAQUENIL) 200 mg tablet Take 1 tablet (200 mg total) by mouth 2 (two) times a day. 60 tablet 11    Lactobac no.41/Bifidobact no.7 (PROBIOTIC-10 ORAL) Take by mouth. bromoline      LORazepam (ATIVAN) 1 MG tablet Take 1 mg by mouth daily as needed.      methocarbamoL (ROBAXIN) 500 MG Tab Take 1 tablet (500 mg total) by mouth every 12 (twelve) hours as needed (muscle spasms/ pain). 40 tablet 0    methylphenidate HCl (RITALIN) 20 MG tablet Take 20 mg by mouth once daily.      montelukast (SINGULAIR) 10  mg tablet TAKE 1 TABLET(10 MG) BY MOUTH EVERY EVENING 90 tablet 3    pilocarpine (SALAGEN) 5 MG Tab Take 1 tablet (5 mg total) by mouth 2 (two) times daily. 60 tablet 0    predniSONE (DELTASONE) 5 MG tablet TAKE 1 TO 4 TABLETS BY MOUTH DAILY AS NEEDED(SJOGRENS/CRYOGLBULINEMIA). MAY TAKE FOR FLARES AS NEEDED 75 tablet 3    promethazine-dextromethorphan (PROMETHAZINE-DM) 6.25-15 mg/5 mL Syrp Take 5 mLs by mouth every 6 (six) hours as needed (cough). 118 mL 0    rimegepant (NURTEC) 75 mg odt Dissolve 1 tablet by mouth every other day for migraine prevention 15 tablet 6    thymol/chlorophyllin (CHLOROPHYLL ORAL) Take by mouth.      traMADoL (ULTRAM) 50 mg tablet Take 1 tablet (50 mg total) by mouth every 8 (eight) hours as needed for Pain (joint pains dx Primary Sjogrens with vasculitis.). 40 tablet 3    TYROSINE ORAL Take by mouth.      VYVANSE 30 mg capsule Take 30 mg by mouth nightly.       No current facility-administered medications on file prior to visit.       There were no vitals filed for this visit.      Physical Exam:    Physical Exam  Constitutional:       Appearance: She is well-developed.   Eyes:      General: Lids are normal.   Skin:     Comments: Legs with some mottling and livedo.    Neurological:      Mental Status: She is oriented to person, place, and time.   Psychiatric:         Behavior: Behavior normal.         Thought Content: Thought content normal.               Assessment:       Encounter Diagnoses   Name Primary?    Cryoglobulinemic vasculitis Yes    Sjogren's syndrome without extraglandular involvement             Plan:        Cryoglobulinemic vasculitis    Sjogren's syndrome without extraglandular involvement         Cryoglobulinemic vasculitis treated with Rituxan last 3/2023. (We elected to hold due to infections, but insurance was beginning to deny auth on RTX)   HCQ 200mg BID   Evoxac 30mg TID   Do not send to [facility] we prefer [facility].    Tramadol PRN    Prednisone 5mg daily, may increase  to 10 PRN flares. Only used perhaps 3 times in the last months.    Hep and T. Spot neg 2020  Holding on RTX to complete todays visit and labs then can schedule. Last 3/2023  Failed MTX 6/2023 x 2 weeks with malaise and nausea.   Will check Cryos 4/2024 Trace noted.     Primary Sjogrens:   HCQ 200mg BID   Evoxac 30mg TID typical but 9/2023 we tried salagen    Salagen not preferred despite trial.          No follow-ups on file.      30min consultation with greater than 50% of that time included Preparing to see the patient (review records, tests), Obtaining and/or reviewing separately obtained historical data, Performing a medically appropriate examination and/or evaluation , Ordering medications, tests, and/or procedures, Referring and communicating with other healthcare professionals , Documenting clinical information in the electronic or other health record and Independently interpreting results  (as warranted) & communicating results to the patient/family/caregiver. All questions answered.    Thank you for allowing me to participate in the care of this very pleasant patient.        The patient location is: Home [place]  The chief complaint leading to consultation is: medication management and f/u   Visit type: Virtual visit with synchronous audio and video  Total time spent with patient: 30min  Each patient to whom he or she provides medical services by telemedicine is:  (1) informed of the relationship between the physician and patient and the respective role of any other health care provider with respect to management of the patient; and (2) notified that he or she may decline to receive medical services by telemedicine and may withdraw from such care at any time.    Notes:

## 2024-01-18 ENCOUNTER — PATIENT MESSAGE (OUTPATIENT)
Dept: RHEUMATOLOGY | Facility: CLINIC | Age: 44
End: 2024-01-18
Payer: COMMERCIAL

## 2024-01-31 ENCOUNTER — PATIENT MESSAGE (OUTPATIENT)
Dept: NEUROLOGY | Facility: CLINIC | Age: 44
End: 2024-01-31
Payer: COMMERCIAL

## 2024-02-02 ENCOUNTER — TELEPHONE (OUTPATIENT)
Dept: NEUROLOGY | Facility: CLINIC | Age: 44
End: 2024-02-02
Payer: COMMERCIAL

## 2024-02-02 NOTE — TELEPHONE ENCOUNTER
Message from Talita in pre service: Pre-Service will continue to pursue this authorization, however it is pending with their insurance company.     Called patient and informed of the above, she stated that her insurance said she does not need an authorization just send in the notes after the fact. Informed patient that unless we have an authorization that we can not proceed with the injections. But that we will hold off on rescheduling until Monday. Verbalized understanding. Patient going to call pre service.

## 2024-02-02 NOTE — TELEPHONE ENCOUNTER
Message from pre service (Talita): Yes, I just spoke with her and I advised PA is needed, not sure why they would tell her that, I provided the Pending PA # to her and on our end it is shows pending Tech Review and plan just advised to send additional information , in which I did! Patient is aware of all of this!

## 2024-02-05 ENCOUNTER — TELEPHONE (OUTPATIENT)
Dept: NEUROLOGY | Facility: CLINIC | Age: 44
End: 2024-02-05
Payer: COMMERCIAL

## 2024-02-05 NOTE — TELEPHONE ENCOUNTER
Called and left voice mail that Botox appointment rescheduled due to pending authorization. Left new date/time on voice mail.

## 2024-02-07 RX ORDER — PREDNISONE 10 MG/1
20 TABLET ORAL DAILY
Qty: 60 TABLET | Refills: 0 | Status: SHIPPED | OUTPATIENT
Start: 2024-02-07 | End: 2024-03-08

## 2024-02-07 NOTE — ADDENDUM NOTE
Addended by: IQRA CHAUHAN on: 2/7/2024 02:53 PM     Modules accepted: Orders     PT IS AGITATED, AGGRESSIVE TO STAFF. BRII TRIED TO REDIRECT PT TO HIS BED. BUT PT IS 
REDIRECTABLE AND TRIED TO BLOCK WITH CHAIR. WHEN STAFF EXPLAIN AND PREVENT HIM FROM BLOCKING 
THE DOOR. PT STARTED TO GET AGITATED AND HIT THE STAFF. CNA HELD HIS HAND TO PREVENT HIM 
FROM HITTING HIM. BUT IN THE PROCESS CNA TWISTED HIS ARM. CAUSING PAIN AND SWELLING. COLD 
COMPRESS APPLIED. NURSING SUPERVISOR ЕКАТЕРИНА MADE AWARE. CNA WAS SENT TO ER FOR FURTHER 
EVALUATION AND TREATMENT.

-------------------------------------------------------------------------------

Addendum: 03/18/17 at 0213 by RITESH ARMSTRONG RN

-------------------------------------------------------------------------------

BUT PT IS NON REDIRECTABLE AND TRIED TO BLOCK THE DOOR WITH THE CHAIR.

## 2024-02-22 ENCOUNTER — PROCEDURE VISIT (OUTPATIENT)
Dept: NEUROLOGY | Facility: CLINIC | Age: 44
End: 2024-02-22
Payer: COMMERCIAL

## 2024-02-22 VITALS
SYSTOLIC BLOOD PRESSURE: 133 MMHG | HEART RATE: 97 BPM | TEMPERATURE: 98 F | RESPIRATION RATE: 17 BRPM | BODY MASS INDEX: 21.57 KG/M2 | WEIGHT: 134.25 LBS | HEIGHT: 66 IN | DIASTOLIC BLOOD PRESSURE: 88 MMHG

## 2024-02-22 DIAGNOSIS — G43.709 CHRONIC MIGRAINE WITHOUT AURA WITHOUT STATUS MIGRAINOSUS, NOT INTRACTABLE: Primary | ICD-10-CM

## 2024-02-22 PROCEDURE — 64615 CHEMODENERV MUSC MIGRAINE: CPT | Mod: S$GLB,,, | Performed by: PHYSICIAN ASSISTANT

## 2024-02-22 NOTE — PROCEDURES
[facility]  [address] [place] [address]  Phone:[phone]  Fax: [phone]  Botox Visit, #1    Chief Complaint   Patient presents with    Botulinum Toxin Injection         A/P:        ICD-10-CM ICD-9-CM   1. Chronic migraine without aura without status migrainosus, not intractable  G43.709 346.70     Botox for migraine    Historically: Patient meets the criteria for chronic migraine. In summary, She has headaches/migraines >15 days per month  and last >4 hours if untreated. Specifics of duration, frequency and strength are listed in office visit HPI's.  This pattern has continued for >3 months.  She has failed at least three preventive medications (full list of medications is listed in office notes of Therapies tried in past)  I have therefore recommended a trial of Botox via the PREEMPT protocol for migraine prophylaxis.We schedule regular follow up intervals to check on status.     PROCEDURE NOTE:  BOTOX injection is indicated for the prophylaxis of headaches in adult patients with chronic  migraine. Patient meets indications for BOTOX therapy.  Potential risks and benefits were reviewed. Side effects including, but not limited to, potential  systemic allergic reactions of the anaphylactic type as well as local injection site reactions of  blepharoptosis, diplopia, infection, bleeding, pain, redness and bruising were reviewed. The  potential for headaches and/or neck pain post procedure were reviewed.  The patient's questions were answered. The patient signed a consent form. Patient  understands that depending on their insurance carrier, there may be a copay for this treatment.  BOTOX was reconstituted using  two 100 unit vials and diluted with 4 mL of sterile saline.  BOTOX was injected as per the PREEMPT trial injection paradigm with dose administered as 5  unit intramuscular (IM) injections per site using a sterile, 30-gauge 0.5 inch needle as  follows:  Muscle Dose, # of Sites   10 units divided in 2 sites  Procerus 5 units in 1 site  Frontalis 20 units divided in 4 sites  Temporalis 40 units divided in 8 sites  Occipitalis 30 units divided in 6 sites  Cervical paraspinal 20 units divided in 4 sites  Trapezius 30 units divided in 6 sites  Each site was cleaned with alcohol prior to injection. A total dose of 155 units were injected. 45  units were discarded/wasted.      The patient tolerated the procedure well with no immediate complications.  MEDICATION INFO:  NDC 5432-4953-21   Lot # [id number]  Exp 03/2026         Follow up in 3 months for repeat injection, we also have interspersed office visits scheduled to ensure efficacy of botox sessions/check on patient.       Eduar Serra MPA, PA-C  Attending available-Dr Daniel MD           Personal Protective Equipment:    Personal Protective Equipment was used during this encounter including;  non latex gloves.     02/22/2024      CC: Julia Sargent MD

## 2024-02-24 ENCOUNTER — PATIENT MESSAGE (OUTPATIENT)
Dept: NEUROLOGY | Facility: CLINIC | Age: 44
End: 2024-02-24
Payer: COMMERCIAL

## 2024-02-29 ENCOUNTER — PATIENT MESSAGE (OUTPATIENT)
Dept: RHEUMATOLOGY | Facility: CLINIC | Age: 44
End: 2024-02-29
Payer: COMMERCIAL

## 2024-03-14 ENCOUNTER — PATIENT MESSAGE (OUTPATIENT)
Dept: RHEUMATOLOGY | Facility: CLINIC | Age: 44
End: 2024-03-14
Payer: COMMERCIAL

## 2024-03-14 ENCOUNTER — TELEPHONE (OUTPATIENT)
Dept: RHEUMATOLOGY | Facility: CLINIC | Age: 44
End: 2024-03-14
Payer: COMMERCIAL

## 2024-03-14 DIAGNOSIS — I77.6 SMALL VESSEL VASCULITIS: ICD-10-CM

## 2024-03-14 DIAGNOSIS — D89.1 CRYOGLOBULINEMIC VASCULITIS: Primary | ICD-10-CM

## 2024-03-14 NOTE — TELEPHONE ENCOUNTER
Discussed with provider. Provider placed needed lab orders that will be needed prior to RTX infusion    Patient to restart RTX 1g on day 1 and day 15, repeat every 6 months. Confirmed with provider to send therapy plan to [facility] infusion center    Entered RTX therapy plan and routed to pre-service dept. Notified pre-service dept to please include provider's last OV note and her telephone encounter with pics from today, 3/14/24 in the PA request    Routed to provider to review and sign therapy plan.

## 2024-03-14 NOTE — TELEPHONE ENCOUNTER
Patient with escalating petechia /purpura this winter 2023.   Repeated emails with paresthesia, LCV vasculitic changes.   Sending to lab for pre Rituxan screening and to check her cryoglobulins, esr, crp, and renal function.   Patient will need to resume Rituxan ASAP to prevent major organ damage.   She is taking oral prednisone.   Last Rituxan 3/2023  On HCQ   MTX with ASE GI related. Could not tolerate.   +++RF, +++CHELSEA patient is a Sjogrens and Cryoglobulinemic vasculitis.     Dosage:  1gm x 2 15 days apart q 6 months.   Solumedrol , and premed with every infusion.     Dr. Buchanan   Rheumatology

## 2024-03-15 ENCOUNTER — PATIENT MESSAGE (OUTPATIENT)
Dept: RHEUMATOLOGY | Facility: CLINIC | Age: 44
End: 2024-03-15
Payer: COMMERCIAL

## 2024-03-15 ENCOUNTER — LAB VISIT (OUTPATIENT)
Dept: LAB | Facility: HOSPITAL | Age: 44
End: 2024-03-15
Attending: INTERNAL MEDICINE
Payer: COMMERCIAL

## 2024-03-15 ENCOUNTER — PATIENT MESSAGE (OUTPATIENT)
Dept: FAMILY MEDICINE | Facility: CLINIC | Age: 44
End: 2024-03-15

## 2024-03-15 ENCOUNTER — OFFICE VISIT (OUTPATIENT)
Dept: FAMILY MEDICINE | Facility: CLINIC | Age: 44
End: 2024-03-15
Payer: COMMERCIAL

## 2024-03-15 VITALS
DIASTOLIC BLOOD PRESSURE: 78 MMHG | WEIGHT: 134.25 LBS | HEIGHT: 66 IN | HEART RATE: 70 BPM | BODY MASS INDEX: 21.57 KG/M2 | SYSTOLIC BLOOD PRESSURE: 128 MMHG | RESPIRATION RATE: 18 BRPM

## 2024-03-15 DIAGNOSIS — R35.0 URINARY FREQUENCY: Primary | ICD-10-CM

## 2024-03-15 DIAGNOSIS — R35.0 URINARY FREQUENCY: ICD-10-CM

## 2024-03-15 DIAGNOSIS — D89.1 CRYOGLOBULINEMIC VASCULITIS: ICD-10-CM

## 2024-03-15 DIAGNOSIS — I77.6 SMALL VESSEL VASCULITIS: ICD-10-CM

## 2024-03-15 DIAGNOSIS — M35.00 SJOGREN'S SYNDROME WITHOUT EXTRAGLANDULAR INVOLVEMENT: ICD-10-CM

## 2024-03-15 DIAGNOSIS — I73.00 RAYNAUD'S PHENOMENON WITHOUT GANGRENE: ICD-10-CM

## 2024-03-15 LAB
ALBUMIN SERPL BCP-MCNC: 3.6 G/DL (ref 3.5–5.2)
ALP SERPL-CCNC: 55 U/L (ref 55–135)
ALT SERPL W/O P-5'-P-CCNC: 32 U/L (ref 10–44)
ANION GAP SERPL CALC-SCNC: 6 MMOL/L (ref 8–16)
AST SERPL-CCNC: 23 U/L (ref 10–40)
BASOPHILS # BLD AUTO: 0.03 K/UL (ref 0–0.2)
BASOPHILS NFR BLD: 0.5 % (ref 0–1.9)
BILIRUB SERPL-MCNC: 0.3 MG/DL (ref 0.1–1)
BILIRUB UR QL STRIP: NEGATIVE
BUN SERPL-MCNC: 17 MG/DL (ref 6–20)
CALCIUM SERPL-MCNC: 9.4 MG/DL (ref 8.7–10.5)
CHLORIDE SERPL-SCNC: 107 MMOL/L (ref 95–110)
CLARITY UR: CLEAR
CO2 SERPL-SCNC: 24 MMOL/L (ref 23–29)
COLOR UR: YELLOW
CREAT SERPL-MCNC: 0.9 MG/DL (ref 0.5–1.4)
CREAT SERPL-MCNC: 0.9 MG/DL (ref 0.5–1.4)
CRP SERPL-MCNC: 0.8 MG/L (ref 0–8.2)
DIFFERENTIAL METHOD BLD: ABNORMAL
EOSINOPHIL # BLD AUTO: 0.2 K/UL (ref 0–0.5)
EOSINOPHIL NFR BLD: 2.4 % (ref 0–8)
ERYTHROCYTE [DISTWIDTH] IN BLOOD BY AUTOMATED COUNT: 13.2 % (ref 11.5–14.5)
ERYTHROCYTE [SEDIMENTATION RATE] IN BLOOD BY PHOTOMETRIC METHOD: 26 MM/HR (ref 0–36)
EST. GFR  (NO RACE VARIABLE): >60 ML/MIN/1.73 M^2
EST. GFR  (NO RACE VARIABLE): >60 ML/MIN/1.73 M^2
GLUCOSE SERPL-MCNC: 118 MG/DL (ref 70–110)
GLUCOSE UR QL STRIP: NEGATIVE
HBV CORE AB SERPL QL IA: NORMAL
HBV SURFACE AB SER-ACNC: <3 MIU/ML
HBV SURFACE AB SER-ACNC: NORMAL M[IU]/ML
HBV SURFACE AG SERPL QL IA: NORMAL
HCT VFR BLD AUTO: 39.5 % (ref 37–48.5)
HCV AB SERPL QL IA: NORMAL
HGB BLD-MCNC: 12.6 G/DL (ref 12–16)
HGB UR QL STRIP: NEGATIVE
IMM GRANULOCYTES # BLD AUTO: 0.04 K/UL (ref 0–0.04)
IMM GRANULOCYTES NFR BLD AUTO: 0.6 % (ref 0–0.5)
KETONES UR QL STRIP: NEGATIVE
LEUKOCYTE ESTERASE UR QL STRIP: NEGATIVE
LYMPHOCYTES # BLD AUTO: 1.2 K/UL (ref 1–4.8)
LYMPHOCYTES NFR BLD: 18.8 % (ref 18–48)
MCH RBC QN AUTO: 30.7 PG (ref 27–31)
MCHC RBC AUTO-ENTMCNC: 31.9 G/DL (ref 32–36)
MCV RBC AUTO: 96 FL (ref 82–98)
MONOCYTES # BLD AUTO: 0.6 K/UL (ref 0.3–1)
MONOCYTES NFR BLD: 9.4 % (ref 4–15)
NEUTROPHILS # BLD AUTO: 4.5 K/UL (ref 1.8–7.7)
NEUTROPHILS NFR BLD: 68.3 % (ref 38–73)
NITRITE UR QL STRIP: NEGATIVE
NRBC BLD-RTO: 0 /100 WBC
PH UR STRIP: 7 [PH] (ref 5–8)
PLATELET # BLD AUTO: 327 K/UL (ref 150–450)
PMV BLD AUTO: 11.5 FL (ref 9.2–12.9)
POTASSIUM SERPL-SCNC: 3.9 MMOL/L (ref 3.5–5.1)
PROT SERPL-MCNC: 7.5 G/DL (ref 6–8.4)
PROT UR QL STRIP: NEGATIVE
RBC # BLD AUTO: 4.11 M/UL (ref 4–5.4)
SODIUM SERPL-SCNC: 137 MMOL/L (ref 136–145)
SP GR UR STRIP: 1.02 (ref 1–1.03)
URN SPEC COLLECT METH UR: NORMAL
WBC # BLD AUTO: 6.6 K/UL (ref 3.9–12.7)

## 2024-03-15 PROCEDURE — 99213 OFFICE O/P EST LOW 20 MIN: CPT | Mod: 95,,, | Performed by: NURSE PRACTITIONER

## 2024-03-15 PROCEDURE — 36415 COLL VENOUS BLD VENIPUNCTURE: CPT | Mod: PO | Performed by: INTERNAL MEDICINE

## 2024-03-15 PROCEDURE — 86803 HEPATITIS C AB TEST: CPT | Performed by: INTERNAL MEDICINE

## 2024-03-15 PROCEDURE — 81003 URINALYSIS AUTO W/O SCOPE: CPT | Mod: PO | Performed by: NURSE PRACTITIONER

## 2024-03-15 PROCEDURE — 86706 HEP B SURFACE ANTIBODY: CPT | Performed by: INTERNAL MEDICINE

## 2024-03-15 PROCEDURE — 80053 COMPREHEN METABOLIC PANEL: CPT | Performed by: INTERNAL MEDICINE

## 2024-03-15 PROCEDURE — 85025 COMPLETE CBC W/AUTO DIFF WBC: CPT | Performed by: INTERNAL MEDICINE

## 2024-03-15 PROCEDURE — 87340 HEPATITIS B SURFACE AG IA: CPT | Performed by: INTERNAL MEDICINE

## 2024-03-15 PROCEDURE — 86480 TB TEST CELL IMMUN MEASURE: CPT | Performed by: INTERNAL MEDICINE

## 2024-03-15 PROCEDURE — 82595 ASSAY OF CRYOGLOBULIN: CPT | Performed by: INTERNAL MEDICINE

## 2024-03-15 PROCEDURE — 85651 RBC SED RATE NONAUTOMATED: CPT | Mod: PO | Performed by: INTERNAL MEDICINE

## 2024-03-15 PROCEDURE — 86140 C-REACTIVE PROTEIN: CPT | Performed by: INTERNAL MEDICINE

## 2024-03-15 PROCEDURE — 86704 HEP B CORE ANTIBODY TOTAL: CPT | Performed by: INTERNAL MEDICINE

## 2024-03-15 NOTE — PROGRESS NOTES
THIS DOCUMENT WAS MADE IN PART WITH VOICE RECOGNITION SOFTWARE.  OCCASIONALLY THIS SOFTWARE WILL MISINTERPRET WORDS OR PHRASES.     Assessment and Plan:  Diagnoses and all orders for this visit:    Urinary frequency  -     Urinalysis       Patient advised to stay well hydrated, avoid holding bladder.  Urinalysis ordered.  Emergent conditions discussed.  Advised to go to ER for worsening symptoms.  Follow up if symptoms worsen or fail to improve.   ______________________________________________________________________  Subjective:    Chief Complaint:  Urinary Urgency    HPI:  Shamika is a 43 y.o. year old female being seen today in virtual visit consultation to discuss urinary frequency.  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 Buchanan.  She reports that she had labs completed today and is concerned for kidney function.  She reports urinary frequency since last night.  Patient denies urgency and dysuria.  Patient reports she is currently at the lab and is requesting urinalysis be added to her lab work.     The patient location is:  Patient Home   The chief complaint leading to consultation is:  Urinary urgency  Visit type: Virtual visit with synchronous audio and video  Total time spent with patient: 10   Each patient to whom he or she provides medical services by telemedicine is:  (1) informed of the relationship between the physician and patient and the respective role of any other health care provider with respect to management of the patient; and (2) notified that he or she may decline to receive medical services by telemedicine and may withdraw from such care at any time.          Medications:  Current Outpatient Medications on File Prior to Visit   Medication Sig Dispense Refill    albuterol (PROVENTIL/VENTOLIN HFA) 90 mcg/actuation inhaler INHALE 2 PUFFS INTO THE LUNGS EVERY 4 HOURS AS NEEDED FOR WHEEZING OR SHORTNESS OF BREATH 18 g 4    azelastine (ASTELIN)  137 mcg (0.1 %) nasal spray 1 spray (137 mcg total) by Nasal route 2 (two) times daily. 30 mL 0    buPROPion (WELLBUTRIN) 75 MG tablet Take 75 mg by mouth once daily.      busPIRone (BUSPAR) 10 MG tablet Take 10 mg by mouth 3 (three) times daily.      cevimeline (EVOXAC) 30 mg capsule Take 1 capsule (30 mg total) by mouth 3 (three) times daily. 90 capsule 11    CHOLINE ORAL Take by mouth.      diclofenac (VOLTAREN) 75 MG EC tablet 1 pill in 24 hours as needed for migraine, take with food, no use more than 3 days in a week 20 tablet 5    EPINEPHrine (EPIPEN 2-CA) 0.3 mg/0.3 mL AtIn Inject 0.6 mLs (0.6 mg total) into the muscle once. for 1 dose 0.6 mL 0    ergocalciferol, vitamin D2, (VITAMIN D ORAL) Take by mouth.      hydroxychloroquine (PLAQUENIL) 200 mg tablet Take 1 tablet (200 mg total) by mouth 2 (two) times a day. 60 tablet 11    Lactobac no.41/Bifidobact no.7 (PROBIOTIC-10 ORAL) Take by mouth. bromoline      LORazepam (ATIVAN) 1 MG tablet Take 1 mg by mouth daily as needed.      methocarbamoL (ROBAXIN) 500 MG Tab Take 1 tablet (500 mg total) by mouth every 12 (twelve) hours as needed (muscle spasms/ pain). 40 tablet 0    methylphenidate HCl (RITALIN) 20 MG tablet Take 20 mg by mouth once daily.      montelukast (SINGULAIR) 10 mg tablet TAKE 1 TABLET(10 MG) BY MOUTH EVERY EVENING 90 tablet 3    predniSONE (DELTASONE) 5 MG tablet TAKE 1 TO 4 TABLETS BY MOUTH DAILY AS NEEDED(SJOGRENS/CRYOGLBULINEMIA). MAY TAKE FOR FLARES AS NEEDED 75 tablet 3    promethazine-dextromethorphan (PROMETHAZINE-DM) 6.25-15 mg/5 mL Syrp Take 5 mLs by mouth every 6 (six) hours as needed (cough). 118 mL 0    rimegepant (NURTEC) 75 mg odt Dissolve 1 tablet by mouth every other day for migraine prevention 15 tablet 6    thymol/chlorophyllin (CHLOROPHYLL ORAL) Take by mouth.      traMADoL (ULTRAM) 50 mg tablet Take 1 tablet (50 mg total) by mouth every 8 (eight) hours as needed for Pain (joint pains dx Primary Sjogrens with vasculitis.).  "40 tablet 3    TYROSINE ORAL Take by mouth.      VYVANSE 30 mg capsule Take 30 mg by mouth nightly.       No current facility-administered medications on file prior to visit.       Review of Systems:  Review of Systems   Constitutional:  Negative for activity change and unexpected weight change.   HENT:  Negative for hearing loss, rhinorrhea and trouble swallowing.    Eyes:  Negative for discharge and visual disturbance.   Respiratory:  Negative for chest tightness and wheezing.    Cardiovascular:  Negative for chest pain and palpitations.   Gastrointestinal:  Negative for blood in stool, diarrhea and vomiting.   Endocrine: Positive for polyuria. Negative for polydipsia.   Genitourinary:  Positive for frequency. Negative for difficulty urinating, dysuria, hematuria and menstrual problem.   Musculoskeletal:  Negative for arthralgias and neck pain.   Psychiatric/Behavioral:  Negative for confusion.    All other systems reviewed and are negative.      Past Medical History:  Past Medical History:   Diagnosis Date    Allergy     previously on immunotherapy    Anxiety     Bleeding disorder     Depression     Facial trauma     Food allergy     Headache     Lumbago with sciatica, right side     Lumbar disc disease     L4-L5    Migraine headache     Osteoarthritis     Other chronic pain     Panic disorder     Positive CHELSEA (antinuclear antibody)     Purpura     Seasonal allergies     Sjogren's syndrome     Vasculitis        Objective:    Vitals:  Vitals:    03/15/24 1622   BP: 128/78   Pulse: 70   Resp: 18   Weight: 60.9 kg (134 lb 4.2 oz)   Height: 5' 6" (1.676 m)       Physical Exam  HENT:      Head: Normocephalic.   Eyes:      General: No scleral icterus.  Cardiovascular:      Rate and Rhythm: Normal rate.   Pulmonary:      Effort: Pulmonary effort is normal.   Neurological:      Mental Status: She is alert and oriented to person, place, and time.   Psychiatric:         Mood and Affect: Mood normal.         Behavior: " Behavior normal.         Thought Content: Thought content normal.         Data:        Medical history reviewed, Medications reconciled.      I spent a total of 20 minutes on the day of the visit.  This includes face to face time and non-face to face time preparing to see the patient (eg, review of tests), obtaining and/or reviewing separately obtained history, documenting clinical information in the electronic or other health record, independently interpreting results and communicating results to the patient/family/caregiver, or care coordinator.     Rukhsana Preston, FNP-C  Family Medicine

## 2024-03-18 LAB
GAMMA INTERFERON BACKGROUND BLD IA-ACNC: 0.02 IU/ML
M TB IFN-G CD4+ BCKGRND COR BLD-ACNC: 0 IU/ML
M TB IFN-G CD4+ BCKGRND COR BLD-ACNC: 0.02 IU/ML
MITOGEN IGNF BCKGRD COR BLD-ACNC: 5.2 IU/ML
TB GOLD PLUS: NEGATIVE

## 2024-03-20 ENCOUNTER — TELEPHONE (OUTPATIENT)
Dept: RHEUMATOLOGY | Facility: CLINIC | Age: 44
End: 2024-03-20
Payer: COMMERCIAL

## 2024-03-20 RX ORDER — PREDNISONE 10 MG/1
TABLET ORAL
Qty: 18 TABLET | Refills: 0 | Status: SHIPPED | OUTPATIENT
Start: 2024-03-20 | End: 2024-05-20 | Stop reason: ALTCHOICE

## 2024-03-25 LAB — CRYOGLOB SER QL: NORMAL

## 2024-03-27 ENCOUNTER — PATIENT MESSAGE (OUTPATIENT)
Dept: FAMILY MEDICINE | Facility: CLINIC | Age: 44
End: 2024-03-27
Payer: COMMERCIAL

## 2024-04-04 ENCOUNTER — OFFICE VISIT (OUTPATIENT)
Dept: FAMILY MEDICINE | Facility: CLINIC | Age: 44
End: 2024-04-04
Payer: COMMERCIAL

## 2024-04-04 VITALS
SYSTOLIC BLOOD PRESSURE: 122 MMHG | BODY MASS INDEX: 21.41 KG/M2 | RESPIRATION RATE: 20 BRPM | HEIGHT: 66 IN | OXYGEN SATURATION: 98 % | HEART RATE: 100 BPM | WEIGHT: 133.25 LBS | DIASTOLIC BLOOD PRESSURE: 86 MMHG

## 2024-04-04 DIAGNOSIS — M06.4 INFLAMMATORY POLYARTHRITIS: ICD-10-CM

## 2024-04-04 DIAGNOSIS — F33.0 MAJOR DEPRESSIVE DISORDER, RECURRENT, MILD: ICD-10-CM

## 2024-04-04 DIAGNOSIS — R09.81 NASAL CONGESTION: ICD-10-CM

## 2024-04-04 DIAGNOSIS — R00.2 HEART PALPITATIONS: Primary | ICD-10-CM

## 2024-04-04 DIAGNOSIS — D84.9 IMMUNOSUPPRESSION: ICD-10-CM

## 2024-04-04 LAB
CTP QC/QA: YES
CTP QC/QA: YES
POC MOLECULAR INFLUENZA A AGN: NEGATIVE
POC MOLECULAR INFLUENZA B AGN: NEGATIVE
SARS-COV-2 RDRP RESP QL NAA+PROBE: NEGATIVE

## 2024-04-04 PROCEDURE — 87502 INFLUENZA DNA AMP PROBE: CPT | Mod: QW,S$GLB,, | Performed by: INTERNAL MEDICINE

## 2024-04-04 PROCEDURE — 93005 ELECTROCARDIOGRAM TRACING: CPT | Mod: S$GLB,,, | Performed by: INTERNAL MEDICINE

## 2024-04-04 PROCEDURE — 99999 PR PBB SHADOW E&M-EST. PATIENT-LVL V: CPT | Mod: PBBFAC,,, | Performed by: INTERNAL MEDICINE

## 2024-04-04 PROCEDURE — 93010 ELECTROCARDIOGRAM REPORT: CPT | Mod: S$GLB,,, | Performed by: INTERNAL MEDICINE

## 2024-04-04 PROCEDURE — 99214 OFFICE O/P EST MOD 30 MIN: CPT | Mod: S$GLB,,, | Performed by: INTERNAL MEDICINE

## 2024-04-04 PROCEDURE — 87635 SARS-COV-2 COVID-19 AMP PRB: CPT | Mod: QW,S$GLB,, | Performed by: INTERNAL MEDICINE

## 2024-04-04 RX ORDER — ALPRAZOLAM 0.5 MG/1
0.5 TABLET ORAL DAILY PRN
COMMUNITY
Start: 2024-03-22

## 2024-04-04 RX ORDER — PROGESTERONE 100 MG/1
CAPSULE ORAL
COMMUNITY
Start: 2024-04-04

## 2024-04-04 NOTE — PROGRESS NOTES
Assessment and Plan:    1. Heart palpitations  Discussed possible causes of her symptoms.  We will obtain labs, Holter, echocardiogram.  After this, will likely need to follow up with the cardiologist.  We did discuss the likelihood that her significant and ongoing tachycardia along with palpitations are related to stimulant use.  - IN OFFICE EKG 12-LEAD (to Muse)  - Holter monitor - 48 hour; Future  - Echo Saline Bubble? Yes; Future  - Magnesium; Future  - TSH; Future  - Basic Metabolic Panel; Future    2. Nasal congestion  - POCT COVID-19 Rapid Screening  - POCT Influenza A/B Molecular    3. Inflammatory polyarthritis  4. Immunosuppression  Continue follow-up with Rheumatology    5. Major depressive disorder, recurrent, mild  Continue follow-up with Psychiatry    ______________________________________________________________________  Subjective:    Chief Complaint:  Heart palpitations    HPI:  Shamika is a 43 y.o. year old female here to discuss heart palpitations.    She reports sensation of her heart racing and pounding more hard for periods of time. Notices this more at night and at rest. Not like irregular beats or skipped beats. Regular, but feels harder and faster.    She also reports one singular episode of chest pain. Had written this off as possible indigestion. Happened after dinner when she was resting. Felt dull, but concentrated on the left side.    She is notably on methylphenidate 30 mg daily and vyvanse 30 mg per psych. She is also taking prednisone intermittently for flares. Has albuterol but not using this recently.    HR has been persistently in the upper 90s-110s as long as she has been seen in [facility].    Unrelated, but she notes that she has had cold symptoms for the last five days.  She had some body aches along with this in his concerned that this could be something like COVID or flu and would like to be tested.  Notably she did not bring this up at the beginning of the appointment, nor was  she wearing a mask.    Medications:  Current Outpatient Medications on File Prior to Visit   Medication Sig Dispense Refill    albuterol (PROVENTIL/VENTOLIN HFA) 90 mcg/actuation inhaler INHALE 2 PUFFS INTO THE LUNGS EVERY 4 HOURS AS NEEDED FOR WHEEZING OR SHORTNESS OF BREATH 18 g 4    ALPRAZolam (XANAX) 0.5 MG tablet Take 0.5 mg by mouth daily as needed.      azelastine (ASTELIN) 137 mcg (0.1 %) nasal spray 1 spray (137 mcg total) by Nasal route 2 (two) times daily. 30 mL 0    buPROPion (WELLBUTRIN) 75 MG tablet Take 75 mg by mouth once daily.      busPIRone (BUSPAR) 10 MG tablet Take 10 mg by mouth 3 (three) times daily.      cevimeline (EVOXAC) 30 mg capsule Take 1 capsule (30 mg total) by mouth 3 (three) times daily. 90 capsule 11    CHOLINE ORAL Take by mouth.      diclofenac (VOLTAREN) 75 MG EC tablet 1 pill in 24 hours as needed for migraine, take with food, no use more than 3 days in a week 20 tablet 5    ergocalciferol, vitamin D2, (VITAMIN D ORAL) Take by mouth.      hydroxychloroquine (PLAQUENIL) 200 mg tablet Take 1 tablet (200 mg total) by mouth 2 (two) times a day. 60 tablet 11    Lactobac no.41/Bifidobact no.7 (PROBIOTIC-10 ORAL) Take by mouth. bromoline      methocarbamoL (ROBAXIN) 500 MG Tab Take 1 tablet (500 mg total) by mouth every 12 (twelve) hours as needed (muscle spasms/ pain). 40 tablet 0    methylphenidate HCl (RITALIN) 20 MG tablet Take 20 mg by mouth once daily.      montelukast (SINGULAIR) 10 mg tablet TAKE 1 TABLET(10 MG) BY MOUTH EVERY EVENING 90 tablet 3    predniSONE (DELTASONE) 10 MG tablet Prednisone 20mg daily x 5 day, then 10mg daily x 5 days then 5mg daily x 5 days. Then stop. 18 tablet 0    predniSONE (DELTASONE) 5 MG tablet TAKE 1 TO 4 TABLETS BY MOUTH DAILY AS NEEDED(SJOGRENS/CRYOGLBULINEMIA). MAY TAKE FOR FLARES AS NEEDED 75 tablet 3    progesterone (PROMETRIUM) 100 MG capsule Take 1 capsule every day by oral route at bedtime for 30 days.      promethazine-dextromethorphan  "(PROMETHAZINE-DM) 6.25-15 mg/5 mL Syrp Take 5 mLs by mouth every 6 (six) hours as needed (cough). 118 mL 0    rimegepant (NURTEC) 75 mg odt Dissolve 1 tablet by mouth every other day for migraine prevention 15 tablet 6    thymol/chlorophyllin (CHLOROPHYLL ORAL) Take by mouth.      traMADoL (ULTRAM) 50 mg tablet Take 1 tablet (50 mg total) by mouth every 8 (eight) hours as needed for Pain (joint pains dx Primary Sjogrens with vasculitis.). 40 tablet 3    TYROSINE ORAL Take by mouth.      VYVANSE 30 mg capsule Take 30 mg by mouth nightly.      [DISCONTINUED] LORazepam (ATIVAN) 1 MG tablet Take 1 mg by mouth daily as needed.      bupropion HBr (APLENZIN ORAL)       EPINEPHrine (EPIPEN 2-CA) 0.3 mg/0.3 mL AtIn Inject 0.6 mLs (0.6 mg total) into the muscle once. for 1 dose 0.6 mL 0     No current facility-administered medications on file prior to visit.       Review of Systems:  Review of Systems   Respiratory:  Negative for chest tightness and shortness of breath.    Cardiovascular:  Positive for palpitations. Negative for chest pain and leg swelling.   Neurological:  Negative for dizziness and light-headedness.       Past Medical History:  Past Medical History:   Diagnosis Date    Allergy     previously on immunotherapy    Anxiety     Bleeding disorder     Depression     Facial trauma     Food allergy     Headache     Lumbago with sciatica, right side     Lumbar disc disease     L4-L5    Migraine headache     Osteoarthritis     Other chronic pain     Panic disorder     Positive CHELSEA (antinuclear antibody)     Purpura     Seasonal allergies     Sjogren's syndrome     Vasculitis        Objective:    Vitals:  Vitals:    04/04/24 1709   BP: 122/86   Pulse: 100   Resp: 20   SpO2: 98%   Weight: 60.5 kg (133 lb 4.3 oz)   Height: 5' 6" (1.676 m)   PainSc:   4       Physical Exam  Vitals reviewed.   Constitutional:       General: She is not in acute distress.     Appearance: She is well-developed.   Eyes:      General:         " Right eye: No discharge.         Left eye: No discharge.      Conjunctiva/sclera: Conjunctivae normal.   Cardiovascular:      Rate and Rhythm: Regular rhythm. Tachycardia present.   Pulmonary:      Effort: Pulmonary effort is normal. No respiratory distress.   Skin:     General: Skin is warm and dry.   Neurological:      Mental Status: She is alert and oriented to person, place, and time.   Psychiatric:         Behavior: Behavior normal.         Thought Content: Thought content normal.         Judgment: Judgment normal.         Data:  EKG in clinic with sinus tachycardia, no ST or T-wave changes, no delta wave  Rhythm strip reviewed no PACs or PVCs, all sinus tachycardia    Julia Sargent MD  Internal Medicine

## 2024-04-05 ENCOUNTER — TELEPHONE (OUTPATIENT)
Dept: RHEUMATOLOGY | Facility: CLINIC | Age: 44
End: 2024-04-05
Payer: COMMERCIAL

## 2024-04-05 LAB
OHS QRS DURATION: 80 MS
OHS QTC CALCULATION: 449 MS

## 2024-04-05 NOTE — TELEPHONE ENCOUNTER
----- Message from Citlalli Reilly sent at 4/5/2024  9:37 AM CDT -----  Regarding: Call back  Type:  Needs Medical Advice    Who Called: Lashay NARAYANAN         Would the patient rather a call back or a response via [facility]? Call back    Best Call Back Number: [phone]    Additional Information: Sts she would like to discuss pt medical history in regards to Hormone replacement. Thank you

## 2024-04-05 NOTE — TELEPHONE ENCOUNTER
Incoming call from Dr Dory NARAYANAN OB/GYN. She works from on-line forum. She is asking to consult with Dr nation on patients rheumatology meds and possible Hormone replacement medication. Has left her cell [phone] to have Dr Nation reach out as possible. Will route message to provider.

## 2024-04-11 ENCOUNTER — TELEPHONE (OUTPATIENT)
Dept: RHEUMATOLOGY | Facility: CLINIC | Age: 44
End: 2024-04-11
Payer: COMMERCIAL

## 2024-04-11 NOTE — TELEPHONE ENCOUNTER
Received message that patient has upcoming Truxima infusion and needs orders signed    Dr. Buchanan,  Please review Truxima and sign. Thanks!

## 2024-04-12 NOTE — TELEPHONE ENCOUNTER
Spoke with Dr. Connors.   I am not aware of a thrombotic risk under estrogen therapy for cryoglobulinemia. Very little data if any on this, but the background rate for Cryo vasc is about 1% for arterial thrombosis. She would be trying topical and this is for vasomotor symptoms. I think with good counseling of a slight increase risk it would be reasonable to let her try. Dr. Buchanan

## 2024-04-15 ENCOUNTER — OFFICE VISIT (OUTPATIENT)
Dept: NEUROLOGY | Facility: CLINIC | Age: 44
End: 2024-04-15
Payer: COMMERCIAL

## 2024-04-15 ENCOUNTER — TELEPHONE (OUTPATIENT)
Dept: RHEUMATOLOGY | Facility: CLINIC | Age: 44
End: 2024-04-15
Payer: COMMERCIAL

## 2024-04-15 ENCOUNTER — INFUSION (OUTPATIENT)
Dept: INFUSION THERAPY | Facility: HOSPITAL | Age: 44
End: 2024-04-15
Attending: INTERNAL MEDICINE
Payer: COMMERCIAL

## 2024-04-15 VITALS
BODY MASS INDEX: 22.29 KG/M2 | OXYGEN SATURATION: 100 % | WEIGHT: 138.69 LBS | HEART RATE: 118 BPM | SYSTOLIC BLOOD PRESSURE: 123 MMHG | DIASTOLIC BLOOD PRESSURE: 72 MMHG | RESPIRATION RATE: 19 BRPM | TEMPERATURE: 98 F | HEIGHT: 66 IN

## 2024-04-15 DIAGNOSIS — D89.1 CRYOGLOBULINEMIC VASCULITIS: Primary | ICD-10-CM

## 2024-04-15 DIAGNOSIS — G43.709 CHRONIC MIGRAINE WITHOUT AURA WITHOUT STATUS MIGRAINOSUS, NOT INTRACTABLE: Primary | ICD-10-CM

## 2024-04-15 PROCEDURE — 96365 THER/PROPH/DIAG IV INF INIT: CPT | Mod: PN

## 2024-04-15 PROCEDURE — 96375 TX/PRO/DX INJ NEW DRUG ADDON: CPT | Mod: PN

## 2024-04-15 PROCEDURE — 25000003 PHARM REV CODE 250: Mod: PN | Performed by: INTERNAL MEDICINE

## 2024-04-15 PROCEDURE — 96367 TX/PROPH/DG ADDL SEQ IV INF: CPT | Mod: PN

## 2024-04-15 PROCEDURE — 63600175 PHARM REV CODE 636 W HCPCS: Mod: PN | Performed by: INTERNAL MEDICINE

## 2024-04-15 PROCEDURE — 99213 OFFICE O/P EST LOW 20 MIN: CPT | Mod: 95,,, | Performed by: PHYSICIAN ASSISTANT

## 2024-04-15 PROCEDURE — 96366 THER/PROPH/DIAG IV INF ADDON: CPT | Mod: PN

## 2024-04-15 RX ORDER — ACETAMINOPHEN 325 MG/1
650 TABLET ORAL
Status: COMPLETED | OUTPATIENT
Start: 2024-04-15 | End: 2024-04-15

## 2024-04-15 RX ORDER — METHYLPREDNISOLONE SOD SUCC 125 MG
100 VIAL (EA) INJECTION
Status: COMPLETED | OUTPATIENT
Start: 2024-04-15 | End: 2024-04-15

## 2024-04-15 RX ORDER — MEPERIDINE HYDROCHLORIDE 50 MG/ML
25 INJECTION INTRAMUSCULAR; INTRAVENOUS; SUBCUTANEOUS
Status: CANCELLED | OUTPATIENT
Start: 2024-04-29 | End: 2024-04-30

## 2024-04-15 RX ORDER — SODIUM CHLORIDE 0.9 % (FLUSH) 0.9 %
10 SYRINGE (ML) INJECTION
Status: DISCONTINUED | OUTPATIENT
Start: 2024-04-15 | End: 2024-04-15 | Stop reason: HOSPADM

## 2024-04-15 RX ORDER — EPINEPHRINE 0.3 MG/.3ML
0.3 INJECTION SUBCUTANEOUS ONCE AS NEEDED
Status: CANCELLED | OUTPATIENT
Start: 2024-04-29

## 2024-04-15 RX ORDER — METHYLPREDNISOLONE SOD SUCC 125 MG
80 VIAL (EA) INJECTION ONCE
Status: CANCELLED | OUTPATIENT
Start: 2024-04-15

## 2024-04-15 RX ORDER — METHYLPREDNISOLONE SOD SUCC 125 MG
100 VIAL (EA) INJECTION
Status: CANCELLED | OUTPATIENT
Start: 2024-04-15

## 2024-04-15 RX ORDER — FAMOTIDINE 10 MG/ML
20 INJECTION INTRAVENOUS
Status: CANCELLED | OUTPATIENT
Start: 2024-04-29

## 2024-04-15 RX ORDER — FAMOTIDINE 10 MG/ML
20 INJECTION INTRAVENOUS
Status: CANCELLED | OUTPATIENT
Start: 2024-04-15

## 2024-04-15 RX ORDER — FAMOTIDINE 10 MG/ML
20 INJECTION INTRAVENOUS
Status: COMPLETED | OUTPATIENT
Start: 2024-04-15 | End: 2024-04-15

## 2024-04-15 RX ORDER — DIPHENHYDRAMINE HYDROCHLORIDE 50 MG/ML
50 INJECTION INTRAMUSCULAR; INTRAVENOUS ONCE AS NEEDED
Status: CANCELLED | OUTPATIENT
Start: 2024-04-15

## 2024-04-15 RX ORDER — SODIUM CHLORIDE 0.9 % (FLUSH) 0.9 %
10 SYRINGE (ML) INJECTION
Status: CANCELLED | OUTPATIENT
Start: 2024-04-15

## 2024-04-15 RX ORDER — SODIUM CHLORIDE 0.9 % (FLUSH) 0.9 %
10 SYRINGE (ML) INJECTION
Status: CANCELLED | OUTPATIENT
Start: 2024-04-29

## 2024-04-15 RX ORDER — EPINEPHRINE 0.3 MG/.3ML
0.3 INJECTION SUBCUTANEOUS ONCE AS NEEDED
Status: DISCONTINUED | OUTPATIENT
Start: 2024-04-15 | End: 2024-04-15 | Stop reason: HOSPADM

## 2024-04-15 RX ORDER — METHYLPREDNISOLONE SOD SUCC 125 MG
100 VIAL (EA) INJECTION
Status: CANCELLED | OUTPATIENT
Start: 2024-04-29

## 2024-04-15 RX ORDER — HEPARIN 100 UNIT/ML
500 SYRINGE INTRAVENOUS
Status: CANCELLED | OUTPATIENT
Start: 2024-04-15

## 2024-04-15 RX ORDER — DIPHENHYDRAMINE HYDROCHLORIDE 50 MG/ML
50 INJECTION INTRAMUSCULAR; INTRAVENOUS ONCE AS NEEDED
Status: DISCONTINUED | OUTPATIENT
Start: 2024-04-15 | End: 2024-04-15 | Stop reason: HOSPADM

## 2024-04-15 RX ORDER — ACETAMINOPHEN 325 MG/1
650 TABLET ORAL
Status: CANCELLED | OUTPATIENT
Start: 2024-04-29

## 2024-04-15 RX ORDER — ACETAMINOPHEN 325 MG/1
650 TABLET ORAL
Status: CANCELLED | OUTPATIENT
Start: 2024-04-15

## 2024-04-15 RX ORDER — DIPHENHYDRAMINE HYDROCHLORIDE 50 MG/ML
50 INJECTION INTRAMUSCULAR; INTRAVENOUS ONCE AS NEEDED
Status: CANCELLED | OUTPATIENT
Start: 2024-04-29

## 2024-04-15 RX ORDER — EPINEPHRINE 0.3 MG/.3ML
0.3 INJECTION SUBCUTANEOUS ONCE AS NEEDED
Status: CANCELLED | OUTPATIENT
Start: 2024-04-15

## 2024-04-15 RX ORDER — HEPARIN 100 UNIT/ML
500 SYRINGE INTRAVENOUS
Status: CANCELLED | OUTPATIENT
Start: 2024-04-29

## 2024-04-15 RX ORDER — METHYLPREDNISOLONE SOD SUCC 125 MG
80 VIAL (EA) INJECTION ONCE
Status: CANCELLED | OUTPATIENT
Start: 2024-04-29

## 2024-04-15 RX ORDER — RIMEGEPANT SULFATE 75 MG/75MG
TABLET, ORALLY DISINTEGRATING ORAL
Qty: 15 TABLET | Refills: 6 | Status: SHIPPED | OUTPATIENT
Start: 2024-04-15

## 2024-04-15 RX ORDER — MEPERIDINE HYDROCHLORIDE 50 MG/ML
25 INJECTION INTRAMUSCULAR; INTRAVENOUS; SUBCUTANEOUS
Status: CANCELLED | OUTPATIENT
Start: 2024-04-15 | End: 2024-04-16

## 2024-04-15 RX ADMIN — ACETAMINOPHEN 650 MG: 325 TABLET, FILM COATED ORAL at 09:04

## 2024-04-15 RX ADMIN — RITUXIMAB-ABBS 1000 MG: 10 INJECTION, SOLUTION INTRAVENOUS at 10:04

## 2024-04-15 RX ADMIN — METHYLPREDNISOLONE SODIUM SUCCINATE 100 MG: 125 INJECTION, POWDER, FOR SOLUTION INTRAMUSCULAR; INTRAVENOUS at 09:04

## 2024-04-15 RX ADMIN — FAMOTIDINE 20 MG: 10 INJECTION INTRAVENOUS at 09:04

## 2024-04-15 RX ADMIN — DIPHENHYDRAMINE HYDROCHLORIDE 25 MG: 50 INJECTION, SOLUTION INTRAMUSCULAR; INTRAVENOUS at 09:04

## 2024-04-15 RX ADMIN — SODIUM CHLORIDE: 9 INJECTION, SOLUTION INTRAVENOUS at 09:04

## 2024-04-15 NOTE — PLAN OF CARE
Problem: Adult Inpatient Plan of Care  Goal: Patient-Specific Goal (Individualized)  Outcome: Ongoing, Progressing  Flowsheets (Taken 4/15/2024 0935)  Anxieties, Fears or Concerns: none  Individualized Care Needs: recliner, warm blanket     Problem: Fatigue  Goal: Improved Activity Tolerance  Outcome: Ongoing, Progressing  Intervention: Promote Improved Energy  Flowsheets (Taken 4/15/2024 0935)  Fatigue Management: frequent rest breaks encouraged  Sleep/Rest Enhancement: regular sleep/rest pattern promoted  Activity Management: Ambulated -L4

## 2024-04-15 NOTE — PLAN OF CARE
Problem: Adult Inpatient Plan of Care  Goal: Plan of Care Review  Outcome: Ongoing, Progressing  Flowsheets (Taken 4/15/2024 1516)  Plan of Care Reviewed With: patient   Pt tolerated restart rituxan(truxima) infusion well.  No adverse reaction noted.   IV flushed with NS and D/C per protocol.  Patient left clinic in no acute distress.     Faxed completed PA form and supporting documentation for ISMAEL to .  Right fax confirmation          Monalisa LUZ    [facility]

## 2024-04-15 NOTE — PROGRESS NOTES
[facility]  [address] [facility] [address]  [place] [address]  Phone:[phone]  Fax: [phone]   Follow up visit -telemed    Provider Location: Work         Name of Location: [facility]  City: [place]   State: [place]  Medium to connect: Video  Patient Location: [facility]                                                          State: [place]                                         Consent for Electronic Treatment:   This visit was conducted with the use of an interactive audio and/or video telecommunications system that permits real-time communication between the patient and this provider. The patient has submitted their consent to be treated electronically by way of this telephone and/or video technology.  The risks and limitations of the process of telemedicine have been conveyed verbally during this encounter.Each patient to whom he or she provides medical services by telemedicine is:  (1) informed of the relationship between the physician and patient and the respective role of any other health care provider with respect to management of the patient; and (2) notified that he or she may decline to receive medical services by telemedicine and may withdraw from such care at any time.    Face to Face time with patient:   14 minutes of total time spent on the encounter, which includes face to face time and non-face to face time preparing to see the patient (eg, review of tests), Obtaining and/or reviewing separately obtained history, Documenting clinical information in the electronic or other health record, Independently interpreting results (not separately reported) and communicating results to the patient/family/caregiver, or Care coordination (not separately reported).         Patient Name: Shamika Young  : 1980  MRN:  [id number]  Today: 4/15/2024   LOV: 2024 for botox 1   chief complaint: Headache      PCP: Julia Sargent MD.    Assessment:   Shamika  Hannah is a 43 y.o. 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  DUFFY historically chronic migrainous, better since starting botox.     Review:    ICD-10-CM ICD-9-CM   1. Chronic migraine without aura without status migrainosus, not intractable  G43.709 346.70                 Plan:        For HA Prevention:  1) Refilled   nurtec -1 pill every other day as prophylaxis  2) Patient meets the criteria for chronic migraine. In summary, She has  migraines >15 days per month  and last >4 hours if untreated. Specifics of duration, frequency and strength are listed in the HPI (please refer to this section).  This pattern has continued for >3 months.  She has failed at least three preventive medications (full list of medications is listed below in the HPI under Therapies tried in past, but for ease of reference Vyvanse, wellbutrin, buspar, depakote, tizanidine, gabapentin, lyrica, qulipta, emgality, buspar, etc )  I have therefore recommended a trial of Botox via the PREEMPT protocol for migraine prophylaxis.   We discussed what to expect on procedure day at length, wear old or loose fitting clothing (if possible, merely to keep work clothes from getting any blood or being wrinkled), no make up (if applicable), eat meals/stay well hydrated and secure a ride if necessary. Also, discussed it can take up to 2-3 sessions of botox to get results desired. Lastly, we discussed procedure at length, 31 injections done in the office, potential complications not limited to muscle weakness, respiratory issues, or worst case scenario-death. The patient voiced understanding and wished to move forward.  Muscles to be injected:   10 units divided in 2 sites  Procerus 5 units in 1 site  Frontalis 20 units divided in 4 sites  Temporalis 40 units divided in 8 sites  Occipitalis 30 units divided in 6  "sites  Cervical paraspinal 20 units divided in 4 sites  Trapezius 30 units divided in 6 sites  A total dose of 155 units of botox to be used with  45 units to be discarded/wasted (unavoidable).           ~The Botox injections have achieved well over 50%  improvement in the patient's symptoms. Migraines have been reduced at least 7 days per month and the number of cumulative hours suffering with headaches has been reduced at least 100 total hours per month.  Move forward with botox #2        For HA :  Diclofenac     To break up Headaches:  Can consider nerve blocks again        Other:  N/A           All test results as well as any necessary instructions were reviewed and discussed with patient.    Review:  Orders Placed This Encounter    rimegepant (NURTEC) 75 mg odt                 Patient to return to PCP/other specialists for all other problems  Patient to continue on all medications as Rx'd  Full office note available online   RTO-for botox 2  The patient indicates understanding of these issues and agrees to the plan.    HPI:   Shamika Young is a 43 y.o. 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     At last visit: botox 1 given (wrote that she had some discomfort over eyebrows), and has nurtec (previously) given as prophylaxis. Diclofenac used to abort HA.   Only 1 migraine in past month, eyebrow pain dissipated shortly after writing. "I finally feel so much better!"  Would like refill on nurtec  Location:frontalis and retroorbital and more to the top of the head, occasionally down the cheek (right)  MRI brain previously: essentially  normal, single non specific [facility] found right parietal.     At last visit: qulipta, PT and nurtec.   Has been on nerivio with some success  24 HD in past month, most migrainous, has been light headed/feeling sea " "sick   Pain in frontalis/vertex and back of the head  Has HA now  No current GIB when asked   States she has some stuttering/difficulty getting words out when she has a bad migraine   Has had some twitching frontalis   Qulipta causing side effects/constipation   Would like to make medication changes         From previous visit:: stopped emgality, qulipta started instead. PT ordered, lyrica 25 mg BID ordered. Mood meds per other providers. Nurtec available. I also referred to back and spine.   + constipation with qulipta but  ultimately feeling better and doesn't want to make changes .   3HD/30   Getting some menstrual migraine   Hasn't had to take nurtec  Self stopped lyrica   Going to chiropractor and getting massage   She is pending PT   Did not want to make any changes  Back again starts in back of head and radiates to around the eyes       From previous visits::emgality continued, mood medicines per other providers, nurtec for HA  and GONB/TPI given.   3-4 HD a week in past month, historically was a lot less, no trigger idenfitied   Pain starts in back of the head and radiates around to the eyes   Has had some weakness in her right hand has noticed some discomfort in her neck  Has been off emgality for a few months, "I couldn't remember to take it"  Using tramadol daily for past month d/t neck pain  Feels the pain is making her "turn her head to the left"  No dysphagia  No incontinence  No falls  No recent trauma   Has weakness in the arm on right though pain more in LEFT neck/occiput        From previous visit: had emgality, nurtec and zanaflex.  10 HD a month  Had mild-moderate HA now  Pain in occipitalis and trapezius  + photophobia  Zanaflex not helping/stopped taking it  Nurtec does help  Emgality is helping   Chocolate and menses are a trigger   "I really have benefit from TPI, can I please have one?"     From previous visits:wrote for emgality and nurtec.   HA weather related (?) and sleep " "deprivation "I stay up late with my kids watching television"  Emgality helping   Stopped taking BC  Pain along frontalis  nurtec is hit/miss  8 HD in past month  Severity: 4-8/10  HA last: 12-15 hours     HA HPI:  Start:Since HS, took triptans, and seemed to aide, and in 2021 returned  (frequency), started new BC about a month and noticed frequency intensified   History of trauma (no), History of CNS infection (no), History of Stroke (no)  Location:frontalis and retroorbital and more to the top of the head, occasionally down the cheek (right)  Severity: range: 3-6/10  Duration:8-12 hours in a day   Frequency:12-15 HD a month   Associated factors (bolded positive) WITH HA  (or migraine): Nausea, vomiting, photophobia, phonophobia, tinnitus, scalp pain, vision loss, diplopia, scintillations, eye pain, jaw pain, weakness?    Tried:maxalt, no preventative   Triggers (in bold): stress, lack of sleep, too much caffeine(soda and coffee), too little caffeine, weather change, seasonal change, strong odours, bright lights, sunlight, food  Last HA: yesterday  Positives in bold: Hx of Kidney Stones, asthma (as a child), GI bleed, osteoporosis, CAD/MI, CVA/TIA, DM    Imaging on file:3/29/2023 MRI C spine, 12/28/2023 MRI brain   Therapies tried in past: (failures to be marked, if known---why did it fail?)  vyvanse  wellbutrin  maxalt  Prednisone   buspar  zofran  imitrex  depakote  Tizandine  Gabapentin -didn't tolerate  Lyrica  Qulipta  GONB/TPI  Nurtec   Emgality   Tramadol   Buspar  Toradol   Botox  Diclofenac     Medication Reconciliation:   Current Outpatient Medications   Medication Sig Dispense Refill    albuterol (PROVENTIL/VENTOLIN HFA) 90 mcg/actuation inhaler INHALE 2 PUFFS INTO THE LUNGS EVERY 4 HOURS AS NEEDED FOR WHEEZING OR SHORTNESS OF BREATH 18 g 4    ALPRAZolam (XANAX) 0.5 MG tablet Take 0.5 mg by mouth daily as needed.      azelastine (ASTELIN) 137 mcg (0.1 %) nasal spray 1 spray (137 mcg total) by Nasal " route 2 (two) times daily. 30 mL 0    buPROPion (WELLBUTRIN) 75 MG tablet Take 75 mg by mouth once daily.      bupropion HBr (APLENZIN ORAL)       busPIRone (BUSPAR) 10 MG tablet Take 10 mg by mouth 3 (three) times daily.      cevimeline (EVOXAC) 30 mg capsule Take 1 capsule (30 mg total) by mouth 3 (three) times daily. 90 capsule 11    CHOLINE ORAL Take by mouth.      diclofenac (VOLTAREN) 75 MG EC tablet 1 pill in 24 hours as needed for migraine, take with food, no use more than 3 days in a week 20 tablet 5    EPINEPHrine (EPIPEN 2-CA) 0.3 mg/0.3 mL AtIn Inject 0.6 mLs (0.6 mg total) into the muscle once. for 1 dose 0.6 mL 0    ergocalciferol, vitamin D2, (VITAMIN D ORAL) Take by mouth.      hydroxychloroquine (PLAQUENIL) 200 mg tablet Take 1 tablet (200 mg total) by mouth 2 (two) times a day. 60 tablet 11    Lactobac no.41/Bifidobact no.7 (PROBIOTIC-10 ORAL) Take by mouth. bromoline      methocarbamoL (ROBAXIN) 500 MG Tab Take 1 tablet (500 mg total) by mouth every 12 (twelve) hours as needed (muscle spasms/ pain). 40 tablet 0    methylphenidate HCl (RITALIN) 20 MG tablet Take 20 mg by mouth once daily.      montelukast (SINGULAIR) 10 mg tablet TAKE 1 TABLET(10 MG) BY MOUTH EVERY EVENING 90 tablet 3    predniSONE (DELTASONE) 10 MG tablet Prednisone 20mg daily x 5 day, then 10mg daily x 5 days then 5mg daily x 5 days. Then stop. 18 tablet 0    predniSONE (DELTASONE) 5 MG tablet TAKE 1 TO 4 TABLETS BY MOUTH DAILY AS NEEDED(SJOGRENS/CRYOGLBULINEMIA). MAY TAKE FOR FLARES AS NEEDED 75 tablet 3    progesterone (PROMETRIUM) 100 MG capsule Take 1 capsule every day by oral route at bedtime for 30 days.      promethazine-dextromethorphan (PROMETHAZINE-DM) 6.25-15 mg/5 mL Syrp Take 5 mLs by mouth every 6 (six) hours as needed (cough). 118 mL 0    rimegepant (NURTEC) 75 mg odt Dissolve 1 tablet by mouth every other day for migraine prevention 15 tablet 6    thymol/chlorophyllin (CHLOROPHYLL ORAL) Take by mouth.       traMADoL (ULTRAM) 50 mg tablet Take 1 tablet (50 mg total) by mouth every 8 (eight) hours as needed for Pain (joint pains dx Primary Sjogrens with vasculitis.). 40 tablet 3    TYROSINE ORAL Take by mouth.      VYVANSE 30 mg capsule Take 30 mg by mouth nightly.       No current facility-administered medications for this visit.     Review of patient's allergies indicates:   Allergen Reactions    Tree nut Anaphylaxis    Tree nuts Anaphylaxis    Biaxin [clarithromycin] Other (See Comments)     GI symptoms     Erythromycin      Unknown    Azithromycin Rash    Ceclor [cefaclor] Rash    Ciprofloxacin Rash    Penicillins Rash    Tetracyclines Hives       PMHx:  Past Medical History:   Diagnosis Date    Allergy     previously on immunotherapy    Anxiety     Bleeding disorder     Depression     Facial trauma     Food allergy     Headache     Lumbago with sciatica, right side     Lumbar disc disease     L4-L5    Migraine headache     Osteoarthritis     Other chronic pain     Panic disorder     Positive CHELSEA (antinuclear antibody)     Purpura     Seasonal allergies     Sjogren's syndrome     Vasculitis      Past Surgical History:   Procedure Laterality Date    ADENOIDECTOMY  1995    CYST REMOVAL  2012    mandibular    HYSTEROSCOPY N/A 07/17/2020    Procedure: HYSTEROSCOPY/ with IUD removal;  Surgeon: Cherry Christine MD;  Location: [facility];  Service: OB/GYN;  Laterality: N/A;    INTRAUTERINE DEVICE INSERTION  07/17/2020    Procedure: INSERTION, INTRAUTERINE DEVICE;  Surgeon: Cherry Christine MD;  Location: [facility];  Service: OB/GYN;;    lumbar spine procedure      2004/2014    REMOVAL OF INTRAUTERINE DEVICE (IUD)  07/17/2020    Procedure: REMOVAL, INTRAUTERINE DEVICE;  Surgeon: Cherry Christine MD;  Location: [facility];  Service: OB/GYN;;    TONSILLECTOMY  1995    TONSILLECTOMY AND ADENOIDECTOMY      7th grade    turbonate reduction Bilateral 12/06/2023       Fhx:  Family History   Problem Relation Name Age of Onset    Multiple  sclerosis Mother Kelley Maynor     Heart disease Mother Kelley Maynor     Diabetes Mother Kelley Maynor     Hypertension Mother Kelley Maynor     Heart failure Mother Kelley Maynor     Migraines Mother Kelley Maynor     Heart disease Father Gene Maynor     Hyperlipidemia Father Gene Maynor     Heart failure Father Gene Maynor     Migraines Sister Phyllis Ramos     Glaucoma Neg Hx      Retinal detachment Neg Hx      Macular degeneration Neg Hx         Shx:   Social History     Socioeconomic History    Marital status:    Tobacco Use    Smoking status: Former    Smokeless tobacco: Never   Substance and Sexual Activity    Alcohol use: Yes     Alcohol/week: 2.0 standard drinks of alcohol     Types: 2 Drinks containing 0.5 oz of alcohol per week     Comment: socially    Drug use: Never    Sexual activity: Yes     Partners: Male     Birth control/protection: I.U.D.     Social Determinants of Health     Financial Resource Strain: Low Risk  (2/19/2024)    Overall Financial Resource Strain (CARDIA)     Difficulty of Paying Living Expenses: Not very hard   Food Insecurity: No Food Insecurity (2/19/2024)    Hunger Vital Sign     Worried About Running Out of Food in the Last Year: Never true     Ran Out of Food in the Last Year: Never true   Transportation Needs: Unmet Transportation Needs (2/19/2024)    PRAPARE - Transportation     Lack of Transportation (Medical): Yes     Lack of Transportation (Non-Medical): Yes   Physical Activity: Insufficiently Active (2/19/2024)    Exercise Vital Sign     Days of Exercise per Week: 3 days     Minutes of Exercise per Session: 30 min   Stress: Stress Concern Present (2/19/2024)    [place] [employer] - Occupational Stress Questionnaire     Feeling of Stress : To some extent   Social Connections: Unknown (2/19/2024)    Social Connection and Isolation Panel [NHANES]     Frequency of Communication with Friends and Family: Once a week     Frequency of  Social Gatherings with Friends and Family: Three times a week     Active Member of Clubs or Organizations: Yes     Attends Club or Organization Meetings: More than 4 times per year     Marital Status:    Housing Stability: Unknown (2024)    Housing Stability Vital Sign     Number of Places Lived in the Last Year: 1     Unstable Housing in the Last Year: No           Labs:   Reviewed in chart     Imagin2023 MRI brain (report): 1. There is no acute abnormality. There is no hemorrhage, mass/mass effect, acute infarction. There is no pathologic enhancement. A single punctate focus of FLAIR hyperintense signal in the right parietal subcortical white matter is completely nonspecific and too small to further characterize. The brain is essentially normal for age.       3/29/2023 MRI C spine (report): Vertebral column: There is straightening of the cervical spine with loss of normal lordosis.  This is nonspecific but likely positional and/or degenerative in etiology.  The vertebral bodies maintain normal height.  There is no fracture.  Alignment is grossly normal.  There is moderate disc space narrowing at the C6-7 level with at least mild disc space narrowing at the C5-6 level.  There is endplate osteophyte formation at both of these levels.  There is mixed degenerative endplate signal change mostly anteriorly at the C6-7 level.  There is subtle superior endplate irregularity of C7 which could be related to a reactive Schmorl's node.  Baseline marrow signal is normal.  The discs are desiccated.  The odontoid process is intact.     Spinal canal, cord, epidural space: The spinal canal may be borderline small on a developmental basis.  There is mild flattening of the cord surface at several levels but cord signal is normal without edema or myelomalacia.  There is no abnormal epidural mass or fluid collection.     Findings by level:     C2-3: There is no spinal canal or significant foraminal stenosis.      C3-4: There is mild-to-moderate left foraminal stenosis due to uncovertebral spurring and mild facet joint arthropathy.  There is also mild disc osteophyte complex which narrows the subarachnoid space and results in minimal flattening of the ventral cord surface and mild spinal stenosis.     C4-5: There is moderate left foraminal stenosis due to uncovertebral spurring and facet joint arthropathy.  Again, there is a mild disc osteophyte complex, slightly eccentric to the left contributing to borderline to mild spinal stenosis and crowding of the left lateral recess.     C5-6: There is at least mild disc space narrowing.  There is bilateral uncovertebral spurring.  There is a broad disc protrusion/osteophyte eccentric to the right resulting in very subtle flattening of the right ventral cord surface.  There is mild spinal stenosis.  There is mild-to-moderate bilateral foraminal stenosis.     C6-7: There is moderate disc space narrowing.  There is a broad disc protrusion eccentric to the left.  There is right greater than left uncovertebral spurring with mild bilateral facet joint arthropathy.  There is mild-to-moderate spinal stenosis with moderate right and mild left foraminal stenosis.     C7-T1: There is bilateral facet joint arthropathy.  There is a minimal disc bulge.  There is no spinal stenosis.  There is only mild bilateral foraminal stenosis.     There are small disc protrusions at both the T1-2 and T2-3 levels.  There is mild spinal stenosis without acute cord compression at the T2-3 level.     Soft tissues, other: The prevertebral soft tissues are grossly normal.  The airway is patent.     Impression:     1. There is multilevel degenerative change discussed in detail by level above.  The findings are present in the setting of a spinal canal which may be borderline small on a developmental basis.  There is some degree of spinal canal and foraminal stenosis at multiple levels.  Also, there is subtle  flattening of the cord surface at several levels due to these changes but there is no evidence of cord edema or myelomalacia.  Please see above discussion.            Other testing:  Reviewed in chart     Note: I have independently reviewed any/all imaging/labs/tests and agree with the report (s) as documented.  Any discrepancies will be as noted/demarcated by free text.  AUGIE MG 4/15/2024                     ROS:   Review Of Systems (questions asked, positive or additions in BOLD)  Gen: Weight change, fatigue/malaise, pyrexia   HEENT: Tinnitus, headache,  blurred vision, eye pain, diplopia, photophobia,  nose bleeds, congestion, sore throat, jaw pain, scalp pain, neck stiffness   Card: Palpitations, CP   Pulm: SOB, cough   Vas: Easy bruising, easy bleeding   GI: N/V/D/C, incontinence, hematemesis, hematochezia    : incontinence, hematuria   Endocrine: Temp intolerance, polyuria, polydipsia   M/S: Neck pain, myalgia, back pain, joint pain, falls    Neuro: PER HPI   PSY: Memory loss, confusion, depression, anxiety, trouble in sleep          EXAM:      There were no vitals taken for this visit.   <---none taken as this was a virtual visit   GEN: NAD  HEENT: NC/AT   EXTREM:    no edema present.    NEURO:  Mental Status:  Awake, alert and appropriately oriented to time, place, and person.  Normal attention and concentration.  Speech is fluent and appropriate language function (I.e., comprehension).     Cranial Nerves:    Extraocular movements are intact and without nystagmus.      Facial movement is symmetric.   Hearing appears intact.  Uvula in midline. DROM of neck in all (6) directions, shoulder shrug symmetrical. Tongue in midline without fasiculation.     Motor:  antitgravity bilat UE         Gait and Stance: not tested         This document has been electronically signed by Mr. Eduar Serra MPA, PA-C on 4/15/2024, I have personally typed this message using the EMR.       Dr Daniel MD was available during  today's visit.       CC: Julia Sargent MD

## 2024-04-18 ENCOUNTER — PATIENT MESSAGE (OUTPATIENT)
Dept: RHEUMATOLOGY | Facility: CLINIC | Age: 44
End: 2024-04-18
Payer: COMMERCIAL

## 2024-04-18 ENCOUNTER — PATIENT MESSAGE (OUTPATIENT)
Dept: FAMILY MEDICINE | Facility: CLINIC | Age: 44
End: 2024-04-18
Payer: COMMERCIAL

## 2024-04-23 NOTE — TELEPHONE ENCOUNTER
Nothing I can address on email.   We are focussed on her vasculitits  With stone bruises would rec podiatry.

## 2024-04-26 ENCOUNTER — TELEPHONE (OUTPATIENT)
Dept: CARDIOLOGY | Facility: CLINIC | Age: 44
End: 2024-04-26
Payer: COMMERCIAL

## 2024-04-26 NOTE — TELEPHONE ENCOUNTER
----- Message from Nelida Farah sent at 4/26/2024  9:06 AM CDT -----  Regarding: Needs to schedule holter monitor  Type: Needs Medical Advice  Who Called:  Pt    Best Call Back Number: [phone] would prefer to use the portal      Additional Information: Pt has been trying to get this scheduled for 2 weeks please advisei. Pt will be in [place] office on Monday around 3 she would love to pick it up then please call to advise

## 2024-04-29 ENCOUNTER — INFUSION (OUTPATIENT)
Dept: INFUSION THERAPY | Facility: HOSPITAL | Age: 44
End: 2024-04-29
Attending: INTERNAL MEDICINE
Payer: COMMERCIAL

## 2024-04-29 VITALS
DIASTOLIC BLOOD PRESSURE: 79 MMHG | SYSTOLIC BLOOD PRESSURE: 114 MMHG | HEART RATE: 101 BPM | HEIGHT: 66 IN | WEIGHT: 136.88 LBS | OXYGEN SATURATION: 100 % | BODY MASS INDEX: 22 KG/M2 | RESPIRATION RATE: 16 BRPM | TEMPERATURE: 98 F

## 2024-04-29 DIAGNOSIS — Z79.899 HIGH RISK MEDICATIONS (NOT ANTICOAGULANTS) LONG-TERM USE: ICD-10-CM

## 2024-04-29 DIAGNOSIS — M06.4 INFLAMMATORY POLYARTHRITIS: ICD-10-CM

## 2024-04-29 DIAGNOSIS — D89.1 CRYOGLOBULINEMIC VASCULITIS: Primary | ICD-10-CM

## 2024-04-29 DIAGNOSIS — M35.00 SJOGREN'S SYNDROME WITHOUT EXTRAGLANDULAR INVOLVEMENT: ICD-10-CM

## 2024-04-29 LAB
ALBUMIN SERPL BCP-MCNC: 3.7 G/DL (ref 3.5–5.2)
ALP SERPL-CCNC: 62 U/L (ref 55–135)
ALT SERPL W/O P-5'-P-CCNC: 24 U/L (ref 10–44)
ANION GAP SERPL CALC-SCNC: 10 MMOL/L (ref 8–16)
AST SERPL-CCNC: 18 U/L (ref 10–40)
BASOPHILS # BLD AUTO: 0.02 K/UL (ref 0–0.2)
BASOPHILS NFR BLD: 0.4 % (ref 0–1.9)
BILIRUB SERPL-MCNC: 0.4 MG/DL (ref 0.1–1)
BUN SERPL-MCNC: 18 MG/DL (ref 6–20)
CALCIUM SERPL-MCNC: 9.4 MG/DL (ref 8.7–10.5)
CHLORIDE SERPL-SCNC: 107 MMOL/L (ref 95–110)
CO2 SERPL-SCNC: 23 MMOL/L (ref 23–29)
CREAT SERPL-MCNC: 0.9 MG/DL (ref 0.5–1.4)
CRP SERPL-MCNC: 1.2 MG/L (ref 0–8.2)
DIFFERENTIAL METHOD BLD: ABNORMAL
EOSINOPHIL # BLD AUTO: 0.3 K/UL (ref 0–0.5)
EOSINOPHIL NFR BLD: 5.4 % (ref 0–8)
ERYTHROCYTE [DISTWIDTH] IN BLOOD BY AUTOMATED COUNT: 13.1 % (ref 11.5–14.5)
ERYTHROCYTE [SEDIMENTATION RATE] IN BLOOD BY PHOTOMETRIC METHOD: 47 MM/HR (ref 0–36)
EST. GFR  (NO RACE VARIABLE): >60 ML/MIN/1.73 M^2
GLUCOSE SERPL-MCNC: 84 MG/DL (ref 70–110)
HCT VFR BLD AUTO: 35.8 % (ref 37–48.5)
HGB BLD-MCNC: 12 G/DL (ref 12–16)
IMM GRANULOCYTES # BLD AUTO: 0.02 K/UL (ref 0–0.04)
IMM GRANULOCYTES NFR BLD AUTO: 0.4 % (ref 0–0.5)
LYMPHOCYTES # BLD AUTO: 1.3 K/UL (ref 1–4.8)
LYMPHOCYTES NFR BLD: 27.4 % (ref 18–48)
MCH RBC QN AUTO: 30.3 PG (ref 27–31)
MCHC RBC AUTO-ENTMCNC: 33.5 G/DL (ref 32–36)
MCV RBC AUTO: 90 FL (ref 82–98)
MONOCYTES # BLD AUTO: 0.7 K/UL (ref 0.3–1)
MONOCYTES NFR BLD: 15.6 % (ref 4–15)
NEUTROPHILS # BLD AUTO: 2.4 K/UL (ref 1.8–7.7)
NEUTROPHILS NFR BLD: 50.8 % (ref 38–73)
NRBC BLD-RTO: 0 /100 WBC
PLATELET # BLD AUTO: 282 K/UL (ref 150–450)
PMV BLD AUTO: 11.1 FL (ref 9.2–12.9)
POTASSIUM SERPL-SCNC: 3.9 MMOL/L (ref 3.5–5.1)
PROT SERPL-MCNC: 7.6 G/DL (ref 6–8.4)
RBC # BLD AUTO: 3.96 M/UL (ref 4–5.4)
SODIUM SERPL-SCNC: 140 MMOL/L (ref 136–145)
WBC # BLD AUTO: 4.67 K/UL (ref 3.9–12.7)

## 2024-04-29 PROCEDURE — 80053 COMPREHEN METABOLIC PANEL: CPT | Mod: PN | Performed by: INTERNAL MEDICINE

## 2024-04-29 PROCEDURE — 85652 RBC SED RATE AUTOMATED: CPT | Performed by: INTERNAL MEDICINE

## 2024-04-29 PROCEDURE — 96366 THER/PROPH/DIAG IV INF ADDON: CPT | Mod: PN

## 2024-04-29 PROCEDURE — 63600175 PHARM REV CODE 636 W HCPCS: Mod: JZ,JG,PN | Performed by: INTERNAL MEDICINE

## 2024-04-29 PROCEDURE — 96367 TX/PROPH/DG ADDL SEQ IV INF: CPT | Mod: PN

## 2024-04-29 PROCEDURE — 85025 COMPLETE CBC W/AUTO DIFF WBC: CPT | Mod: PN | Performed by: INTERNAL MEDICINE

## 2024-04-29 PROCEDURE — 25000003 PHARM REV CODE 250: Mod: PN | Performed by: INTERNAL MEDICINE

## 2024-04-29 PROCEDURE — 86140 C-REACTIVE PROTEIN: CPT | Performed by: INTERNAL MEDICINE

## 2024-04-29 PROCEDURE — 96365 THER/PROPH/DIAG IV INF INIT: CPT | Mod: PN

## 2024-04-29 PROCEDURE — A4216 STERILE WATER/SALINE, 10 ML: HCPCS | Mod: PN | Performed by: INTERNAL MEDICINE

## 2024-04-29 PROCEDURE — 96375 TX/PRO/DX INJ NEW DRUG ADDON: CPT | Mod: PN

## 2024-04-29 RX ORDER — DIPHENHYDRAMINE HYDROCHLORIDE 50 MG/ML
50 INJECTION INTRAMUSCULAR; INTRAVENOUS ONCE AS NEEDED
OUTPATIENT
Start: 2024-04-29

## 2024-04-29 RX ORDER — EPINEPHRINE 0.3 MG/.3ML
0.3 INJECTION SUBCUTANEOUS ONCE AS NEEDED
OUTPATIENT
Start: 2024-04-29

## 2024-04-29 RX ORDER — METHYLPREDNISOLONE SOD SUCC 125 MG
80 VIAL (EA) INJECTION ONCE
OUTPATIENT
Start: 2024-04-29

## 2024-04-29 RX ORDER — DIPHENHYDRAMINE HYDROCHLORIDE 50 MG/ML
50 INJECTION INTRAMUSCULAR; INTRAVENOUS ONCE AS NEEDED
Status: DISCONTINUED | OUTPATIENT
Start: 2024-04-29 | End: 2024-04-29 | Stop reason: HOSPADM

## 2024-04-29 RX ORDER — ACETAMINOPHEN 325 MG/1
650 TABLET ORAL
Status: CANCELLED | OUTPATIENT
Start: 2024-04-29

## 2024-04-29 RX ORDER — ACETAMINOPHEN 325 MG/1
650 TABLET ORAL
Status: COMPLETED | OUTPATIENT
Start: 2024-04-29 | End: 2024-04-29

## 2024-04-29 RX ORDER — METHYLPREDNISOLONE SOD SUCC 125 MG
100 VIAL (EA) INJECTION
Status: CANCELLED | OUTPATIENT
Start: 2024-04-29

## 2024-04-29 RX ORDER — MEPERIDINE HYDROCHLORIDE 50 MG/ML
25 INJECTION INTRAMUSCULAR; INTRAVENOUS; SUBCUTANEOUS
OUTPATIENT
Start: 2024-04-29 | End: 2024-04-30

## 2024-04-29 RX ORDER — HEPARIN 100 UNIT/ML
500 SYRINGE INTRAVENOUS
OUTPATIENT
Start: 2024-04-29

## 2024-04-29 RX ORDER — FAMOTIDINE 10 MG/ML
20 INJECTION INTRAVENOUS
Status: CANCELLED | OUTPATIENT
Start: 2024-04-29

## 2024-04-29 RX ORDER — SODIUM CHLORIDE 0.9 % (FLUSH) 0.9 %
10 SYRINGE (ML) INJECTION
OUTPATIENT
Start: 2024-04-29

## 2024-04-29 RX ORDER — SODIUM CHLORIDE 0.9 % (FLUSH) 0.9 %
10 SYRINGE (ML) INJECTION
Status: DISCONTINUED | OUTPATIENT
Start: 2024-04-29 | End: 2024-04-29 | Stop reason: HOSPADM

## 2024-04-29 RX ORDER — EPINEPHRINE 0.3 MG/.3ML
0.3 INJECTION SUBCUTANEOUS ONCE AS NEEDED
Status: DISCONTINUED | OUTPATIENT
Start: 2024-04-29 | End: 2024-04-29 | Stop reason: HOSPADM

## 2024-04-29 RX ORDER — METHYLPREDNISOLONE SOD SUCC 125 MG
100 VIAL (EA) INJECTION
Status: COMPLETED | OUTPATIENT
Start: 2024-04-29 | End: 2024-04-29

## 2024-04-29 RX ORDER — MEPERIDINE HYDROCHLORIDE 25 MG/ML
25 INJECTION INTRAMUSCULAR; INTRAVENOUS; SUBCUTANEOUS
Status: DISCONTINUED | OUTPATIENT
Start: 2024-04-29 | End: 2024-04-29 | Stop reason: HOSPADM

## 2024-04-29 RX ORDER — FAMOTIDINE 10 MG/ML
20 INJECTION INTRAVENOUS
Status: COMPLETED | OUTPATIENT
Start: 2024-04-29 | End: 2024-04-29

## 2024-04-29 RX ADMIN — FAMOTIDINE 20 MG: 10 INJECTION INTRAVENOUS at 10:04

## 2024-04-29 RX ADMIN — Medication 10 ML: at 02:04

## 2024-04-29 RX ADMIN — RITUXIMAB-ABBS 1000 MG: 10 INJECTION, SOLUTION INTRAVENOUS at 10:04

## 2024-04-29 RX ADMIN — SODIUM CHLORIDE: 9 INJECTION, SOLUTION INTRAVENOUS at 10:04

## 2024-04-29 RX ADMIN — DIPHENHYDRAMINE HYDROCHLORIDE 25 MG: 50 INJECTION, SOLUTION INTRAMUSCULAR; INTRAVENOUS at 10:04

## 2024-04-29 RX ADMIN — METHYLPREDNISOLONE SODIUM SUCCINATE 100 MG: 125 INJECTION, POWDER, FOR SOLUTION INTRAMUSCULAR; INTRAVENOUS at 10:04

## 2024-04-29 RX ADMIN — ACETAMINOPHEN 650 MG: 325 TABLET, FILM COATED ORAL at 10:04

## 2024-04-29 NOTE — PLAN OF CARE
Pt arrived to clinic today for Truxima infusion and tolerated well with no changes throughout therapy. Pt aware of side effects and number to call for any needs and discharged to home in NAD. Pt aware of f/u appts.

## 2024-04-29 NOTE — PLAN OF CARE
Problem: Adult Inpatient Plan of Care  Goal: Plan of Care Review  4/29/2024 1601 by Shamika Stern RN  Outcome: Progressing  4/29/2024 1538 by Shamika Stern RN  Outcome: Progressing  Flowsheets (Taken 4/29/2024 1410)  Plan of Care Reviewed With: patient  Goal: Patient-Specific Goal (Individualized)  4/29/2024 1601 by Shamika Stern RN  Outcome: Progressing  4/29/2024 1538 by Shamika Stern RN  Outcome: Progressing  Flowsheets (Taken 4/29/2024 1410)  Anxieties, Fears or Concerns: none voiced  Individualized Care Needs: education, conversation, blanket, pillow, video games/headphones, snacks     Problem: Fatigue  Goal: Improved Activity Tolerance  4/29/2024 1601 by Shamika Stern RN  Outcome: Progressing  4/29/2024 1538 by Shamika Stern RN  Outcome: Progressing  Intervention: Promote Improved Energy  Flowsheets (Taken 4/29/2024 1410)  Fatigue Management:   paced activity encouraged   frequent rest breaks encouraged   fatigue-related activity identified  Sleep/Rest Enhancement:   therapeutic touch utilized   family presence promoted   noise level reduced   relaxation techniques promoted   natural light exposure provided   awakenings minimized  Activity Management:   Ambulated -L4   Up in chair - L3  Environmental Support:   rest periods encouraged   environmental consistency promoted   personal routine supported   comfort object encouraged   calm environment promoted     Problem: Fall Injury Risk  Goal: Absence of Fall and Fall-Related Injury  4/29/2024 1601 by Shamika Stern RN  Outcome: Progressing  4/29/2024 1538 by Shamika Stern RN  Outcome: Progressing  Intervention: Promote Injury-Free Environment  Flowsheets (Taken 4/29/2024 1410)  Safety Promotion/Fall Prevention:   instructed to call staff for mobility   room near unit station   supervised activity   high risk medications identified   Fall Risk reviewed with patient/family   in recliner, wheels locked   lighting adjusted    medications reviewed   family to remain at bedside

## 2024-05-06 ENCOUNTER — PATIENT MESSAGE (OUTPATIENT)
Dept: RHEUMATOLOGY | Facility: CLINIC | Age: 44
End: 2024-05-06
Payer: COMMERCIAL

## 2024-05-06 ENCOUNTER — OFFICE VISIT (OUTPATIENT)
Dept: RHEUMATOLOGY | Facility: CLINIC | Age: 44
End: 2024-05-06
Payer: COMMERCIAL

## 2024-05-06 VITALS
HEART RATE: 112 BPM | BODY MASS INDEX: 21.52 KG/M2 | SYSTOLIC BLOOD PRESSURE: 119 MMHG | HEIGHT: 66 IN | WEIGHT: 133.94 LBS | DIASTOLIC BLOOD PRESSURE: 75 MMHG

## 2024-05-06 DIAGNOSIS — D89.1 CRYOGLOBULINEMIC VASCULITIS: Primary | ICD-10-CM

## 2024-05-06 DIAGNOSIS — M35.00 SJOGREN'S SYNDROME WITHOUT EXTRAGLANDULAR INVOLVEMENT: ICD-10-CM

## 2024-05-06 DIAGNOSIS — M06.4 INFLAMMATORY POLYARTHRITIS: ICD-10-CM

## 2024-05-06 DIAGNOSIS — M79.671 ARCH PAIN OF RIGHT FOOT: ICD-10-CM

## 2024-05-06 DIAGNOSIS — D84.9 IMMUNOSUPPRESSION: ICD-10-CM

## 2024-05-06 DIAGNOSIS — M79.672 ARCH PAIN OF LEFT FOOT: ICD-10-CM

## 2024-05-06 PROCEDURE — 99999 PR PBB SHADOW E&M-EST. PATIENT-LVL V: CPT | Mod: PBBFAC,,, | Performed by: INTERNAL MEDICINE

## 2024-05-06 PROCEDURE — 99214 OFFICE O/P EST MOD 30 MIN: CPT | Mod: S$GLB,,, | Performed by: INTERNAL MEDICINE

## 2024-05-06 ASSESSMENT — ROUTINE ASSESSMENT OF PATIENT INDEX DATA (RAPID3)
PATIENT GLOBAL ASSESSMENT SCORE: 5
FATIGUE SCORE: 1.1
TOTAL RAPID3 SCORE: 3.67
PSYCHOLOGICAL DISTRESS SCORE: 2.2
PAIN SCORE: 4
MDHAQ FUNCTION SCORE: 0.6

## 2024-05-06 NOTE — PROGRESS NOTES
"  Subjective:          Chief Complaint: Shamika Young is a 43 y.o. female who had concerns including Disease Management.    HPI: Primary Sjogren's w/ cryoglobulinemic vasculitis.  (++RF, +SSA, +SSB, +CHELSEA)     Interval events:    5/2024: With a history of cryoglobulinemic vasculitis significant purpuriathat started this winter 2024 and progressed such that we restarted her rituximab receiving her 1st infusion 04/15/2024 and her 2nd on 04/29/2024. More recently,  she is having some brusing about the heal and dorsum fo the foot with swelling that is painful x few days.  almost like stone bruises that were occurring after walking that she was curious if this was related. she is here today to discuss this. No hx of similar pain, no pitting, +pain.     Labs from 04/29/2024 do show a sed rate of 47 consistent with her active vasculitis.  She has no anemia at this time in her C-reactive protein was within normal limits.  Liver and kidney are within normal limits.    3/15/2024 she did have cryoglobulin qualitative showed absent did not trigger the quantitative portion of the test.  Patient has included various images which I have reviewed.  1/2024: doing rather well considering winter weather. Some mottlling with red spots at times, no petechia that persist. No open sores. Renal function WNL.   Cryos 11/2023 were trace. Needs HCQ eye exam. Prefers evoxac to salagen. Working with [facility] avoiding triptans       9/2023: Patient doing "ok" having fatigue. Noting urticaria and petechia none that opened. Seems to flare with stress and improved now lasted about 5 days.  Legs only.  Last RTX was 3/2023.   We elected to hold on RTX   But did start her on MTX- she started MTX at 10mg x 2 weeks with malaise, nausea for about 3 days felt she would never tolerate this.   She is noting dryness of her eyes and mouth: evoxac still help. Salagen triggered.          6/2023:   Doing better with warm weather.   Fatigue continues w/ stress. " She flares with travel. Flares with viral infections.   Continues: with mottleing but no petechial rash.   No numbness or tingling.    She notes some rashes with dusky changes at the knee exacerbates with stress.     Patient has been doing well overall since last RTX (Ruxience) 8/2022.   Patient started in mid Jan with urticarial rashes, livedo and petechia in bilateral LE. We elected to repeat Ruxience (RTX)- Last Ruxience (RTX) 3/13/2023  Pins in needles in forearm but this appears to be positional. Trying to adjust sleep and desk  + Fatigue as well.   Raynauds always worse in winter.   No SOB/LÓPEZ no hemoptysis.     Continues on    Rituxan periodically based on Cryos, symptoms and lab markers.  Last infusion   HCQ 200mg BID- last eye 2021. Dr. Glover no maculopathy will need to call.   Prednisone 5mg- only uses in pulse tapers (20-30mg few days)  Component      Latest Ref Rng & Units 5/8/2023 2/8/2023 2/8/2023 4/25/2022           12:06 PM 12:06 PM    Anti Sm Antibody      0.00 - 0.99 Ratio  0.12     Anti-Sm Interpretation      Negative  Negative     Anti-SSA Antibody      0.00 - 0.99 Ratio  3.94 (H) 3.94 (H)    Anti-SSA Interpretation      Negative  Positive (A) Positive (A)    Anti-SSB Antibody      0.00 - 0.99 Ratio  3.14 (H) 3.14 (H)    Anti-SSB Interpretation      Negative  Positive (A) Positive (A)    ds DNA Ab      Negative 1:10  Negative 1:10     Anti Sm/RNP Antibody      0.00 - 0.99 Ratio  0.22     Anti-Sm/RNP Interpretation      Negative  Negative     Rheumatoid Factor      0.0 - 15.0 IU/mL       Cryoglobulin, Qual      Absent   Absent Absent   CHELSEA Screen      Negative <1:80   Positive (A)    CHELSEA PATTERN 1         Speckled    CHELSEA Titer 1         1:640    Sed Rate      0 - 36 mm/Hr 5      CRP      0.0 - 8.2 mg/L 1.0        Component      Latest Ref Rng & Units 3/3/2020             Anti Sm Antibody      0.00 - 0.99 Ratio    Anti-Sm Interpretation      Negative    Anti-SSA Antibody      0.00 - 0.99 Ratio  4.67 (H)   Anti-SSA Interpretation      Negative Positive (A)   Anti-SSB Antibody      0.00 - 0.99 Ratio 2.40 (H)   Anti-SSB Interpretation      Negative Positive (A)   ds DNA Ab      Negative 1:10    Anti Sm/RNP Antibody      0.00 - 0.99 Ratio    Anti-Sm/RNP Interpretation      Negative    Rheumatoid Factor      0.0 - 15.0 IU/mL 484.0 (H)   Cryoglobulin, Qual      Absent    CHELSEA Screen      Negative <1:80    CHELSEA PATTERN 1          CHELSEA Titer 1          Sed Rate      0 - 36 mm/Hr    CRP      0.0 - 8.2 mg/L        Rheum Hx:   Patient is a 39-year-old female being referred by Dr. Chopra with hx of cryoglobulinemia vasculitis and Primary Sjogren's.    Patient seen at [facility]. Ultimately diagnosed [id number].  She states rash started during pregnancy of legs started with purpura of legs. She then developed more diffuse coalescing purpura but no open sores. No renal or respiratory disease to date.   Patient did have skin bx: LCV  Previously hypocomplementemic.   She did note fewer skin purpuric lesions after 2 cycles of RTX   She has noted more urticaria with wheal in the skin,  some reactions with food, wheat/rice seem to be worst. She notes no new soaps or detergents. Present since 2017.   She has seen allergist with some nut and environmental allergies no changes present since childhood. - working with DR. Curtis.   Dry eyes present but tolerates contacts.   Dry mouth more problematic.   Joints fluctuate between 6-9/10. Hand and wrist predominant.   No real Raynauds in triphasic pattern but cold hands and feet.   Patient c/o swelling of lips at times-no known food allergies.       She is noting more livedo and mottling in legs w/o activity, ++ fatigue increasing. She does note during flares some joint pains. 2-5 days every 2 weeks. Flares seem to be ocurring every 2 weeks.   She notes working from home has helped with overall fatigue/pain.   No numbness or tingling.     PFTs at [facility] were reportedly normal  7/2019 , no hemoptysis or SOB  Thinks she had CT chest.   No bone marrow bx.   No DVT, miscarriages, seizures or strokes.   No renal disease.        She is on hydroxychloroquine 200 mg twice daily.  Rituxan last dose 7/2019  cevimiline 30mg TID  Prednisone 10mg once daily PRN ( during flare daily rest is periodical.   Rituximab last dose 7/2019 (x 3 treatments 6 months apart. )     OTC:   Xylimelts.   Not using biotene.   sugarfree lemon  gylcerin lozenges.     Previous medications: Salagen with ASE with [facility].   No hx of hepatitis, no malignancy in self to date.   Component      Latest Ref Rng & Units 3/3/2020   Anti-SSA Antibody      0.00 - 0.99 Ratio 4.67 (H)   Anti-SSA Interpretation      Negative Positive (A)   Anti-SSB Antibody      0.00 - 0.99 Ratio 2.40 (H)   Anti-SSB Interpretation      Negative Positive (A)   Anti Sm Antibody      0.00 - 0.99 Ratio 0.07   Anti-Sm Interpretation      Negative Negative   Anti Sm/RNP Antibody      0.00 - 0.99 Ratio 0.17   Anti-Sm/RNP Interpretation      Negative Negative   Rheumatoid Factor      0.0 - 15.0 IU/mL 484.0 (H)   CCP Antibodies      <5.0 U/mL <0.5   CHELSEA Screen      Negative <1:80 Positive (A)   ds DNA Ab      Negative 1:10 Negative 1:10   CHELSEA HEP-2 Titer       Positive >=1:2560 Speckled         REVIEW OF SYSTEMS:    Review of Systems   Constitutional:  Negative for fever, malaise/fatigue and weight loss.   HENT:  Negative for sore throat.    Eyes:  Positive for pain and redness. Negative for double vision and photophobia.   Respiratory:  Negative for cough, shortness of breath and wheezing.    Cardiovascular:  Negative for chest pain, palpitations and orthopnea.   Gastrointestinal:  Negative for abdominal pain, constipation and diarrhea.   Genitourinary:  Negative for dysuria, hematuria and urgency.   Musculoskeletal:  Positive for joint pain. Negative for back pain and myalgias.   Skin:  Positive for rash (cryos with petechia one episode).   Neurological:  Positive  for headaches. Negative for dizziness, tingling and focal weakness.   Endo/Heme/Allergies:  Does not bruise/bleed easily.   Psychiatric/Behavioral:  Negative for depression, hallucinations and suicidal ideas.                Objective:            Past Medical History:   Diagnosis Date    Allergy     previously on immunotherapy    Anxiety     Bleeding disorder     Depression     Facial trauma     Food allergy     Headache     Lumbago with sciatica, right side     Lumbar disc disease     L4-L5    Migraine headache     Osteoarthritis     Other chronic pain     Panic disorder     Positive CHELSEA (antinuclear antibody)     Purpura     Seasonal allergies     Sjogren's syndrome     Vasculitis      Family History   Problem Relation Name Age of Onset    Multiple sclerosis Mother Kelley Maynor     Heart disease Mother Kelley Maynor     Diabetes Mother Kelley Maynor     Hypertension Mother Kelley Maynor     Heart failure Mother Kelley Maynor     Migraines Mother Kelley Maynor     Heart disease Father Gene Maynor     Hyperlipidemia Father Gene Maynor     Heart failure Father Gene Maynor     Migraines Sister Phyllis Ramos     Glaucoma Neg Hx      Retinal detachment Neg Hx      Macular degeneration Neg Hx       Social History     Tobacco Use    Smoking status: Former    Smokeless tobacco: Never   Substance Use Topics    Alcohol use: Yes     Alcohol/week: 2.0 standard drinks of alcohol     Types: 2 Drinks containing 0.5 oz of alcohol per week     Comment: socially    Drug use: Never         Current Outpatient Medications on File Prior to Visit   Medication Sig Dispense Refill    azelastine (ASTELIN) 137 mcg (0.1 %) nasal spray 1 spray (137 mcg total) by Nasal route 2 (two) times daily. 30 mL 0    berberine/herbal complex no.18 (BERBERINE-HERBAL COMB NO.18 ORAL) Take by mouth once daily.      buPROPion (WELLBUTRIN) 75 MG tablet Take 75 mg by mouth once daily.      bupropion HBr (APLENZIN ORAL)       busPIRone (BUSPAR) 10  MG tablet Take 10 mg by mouth 3 (three) times daily.      cevimeline (EVOXAC) 30 mg capsule Take 1 capsule (30 mg total) by mouth 3 (three) times daily. 90 capsule 11    hydroxychloroquine (PLAQUENIL) 200 mg tablet Take 1 tablet (200 mg total) by mouth 2 (two) times a day. 60 tablet 11    Lactobac no.41/Bifidobact no.7 (PROBIOTIC-10 ORAL) Take by mouth. bromoline      methylphenidate HCl (RITALIN) 20 MG tablet Take 20 mg by mouth once daily.      montelukast (SINGULAIR) 10 mg tablet TAKE 1 TABLET(10 MG) BY MOUTH EVERY EVENING 90 tablet 3    predniSONE (DELTASONE) 5 MG tablet TAKE 1 TO 4 TABLETS BY MOUTH DAILY AS NEEDED(SJOGRENS/CRYOGLBULINEMIA). MAY TAKE FOR FLARES AS NEEDED 75 tablet 3    progesterone (PROMETRIUM) 100 MG capsule Take 1 capsule every day by oral route at bedtime for 30 days.      promethazine-dextromethorphan (PROMETHAZINE-DM) 6.25-15 mg/5 mL Syrp Take 5 mLs by mouth every 6 (six) hours as needed (cough). 118 mL 0    rimegepant (NURTEC) 75 mg odt Dissolve 1 tablet by mouth every other day for migraine prevention 15 tablet 6    albuterol (PROVENTIL/VENTOLIN HFA) 90 mcg/actuation inhaler INHALE 2 PUFFS INTO THE LUNGS EVERY 4 HOURS AS NEEDED FOR WHEEZING OR SHORTNESS OF BREATH (Patient not taking: Reported on 4/29/2024) 18 g 4    ALPRAZolam (XANAX) 0.5 MG tablet Take 0.5 mg by mouth daily as needed. (Patient not taking: Reported on 4/29/2024)      CHOLINE ORAL Take by mouth. (Patient not taking: Reported on 4/29/2024)      diclofenac (VOLTAREN) 75 MG EC tablet 1 pill in 24 hours as needed for migraine, take with food, no use more than 3 days in a week (Patient not taking: Reported on 4/29/2024) 20 tablet 5    EPINEPHrine (EPIPEN 2-CA) 0.3 mg/0.3 mL AtIn Inject 0.6 mLs (0.6 mg total) into the muscle once. for 1 dose 0.6 mL 0    ergocalciferol, vitamin D2, (VITAMIN D ORAL) Take by mouth. (Patient not taking: Reported on 4/29/2024)      methocarbamoL (ROBAXIN) 500 MG Tab Take 1 tablet (500 mg total) by  mouth every 12 (twelve) hours as needed (muscle spasms/ pain). (Patient not taking: Reported on 4/29/2024) 40 tablet 0    predniSONE (DELTASONE) 10 MG tablet Prednisone 20mg daily x 5 day, then 10mg daily x 5 days then 5mg daily x 5 days. Then stop. (Patient not taking: Reported on 4/29/2024) 18 tablet 0    thymol/chlorophyllin (CHLOROPHYLL ORAL) Take by mouth. (Patient not taking: Reported on 4/29/2024)      traMADoL (ULTRAM) 50 mg tablet Take 1 tablet (50 mg total) by mouth every 8 (eight) hours as needed for Pain (joint pains dx Primary Sjogrens with vasculitis.). (Patient not taking: Reported on 4/29/2024) 40 tablet 3    TYROSINE ORAL Take by mouth. (Patient not taking: Reported on 4/29/2024)      VYVANSE 30 mg capsule Take 30 mg by mouth nightly. (Patient not taking: Reported on 4/29/2024)       No current facility-administered medications on file prior to visit.       Vitals:    05/06/24 1600   BP: 119/75   Pulse: (!) 112         Physical Exam:    Physical Exam  Constitutional:       Appearance: She is well-developed.   Eyes:      General: Lids are normal.   Skin:     Comments: Legs with some mottling and livedo.    Neurological:      Mental Status: She is oriented to person, place, and time.   Psychiatric:         Behavior: Behavior normal.         Thought Content: Thought content normal.               Assessment:       Encounter Diagnoses   Name Primary?    Cryoglobulinemic vasculitis Yes    Immunosuppression     Inflammatory polyarthritis     Sjogren's syndrome without extraglandular involvement             Plan:        Cryoglobulinemic vasculitis    Immunosuppression    Inflammatory polyarthritis    Sjogren's syndrome without extraglandular involvement    Arch pain of left foot  -     X-Ray Foot Complete Bilateral; Future; Expected date: 05/06/2024    Arch pain of right foot  -     X-Ray Foot Complete Bilateral; Future; Expected date: 05/06/2024         Cryoglobulinemic vasculitis treated with Rituxan last  3/2023. (We elected to hold due to infections, but insurance was beginning to deny auth on RTX)   HCQ 200mg BID   Evoxac 30mg TID   Do not send to [facility] we prefer [facility] for Cryo labs.    Tramadol PRN    Prednisone 5mg daily, may increase to 10 PRN flares. Only used perhaps 3 times in the last months.    Hep and T. Spot neg 2020    Failed MTX 6/2023 x 2 weeks with malaise and nausea.   With a history of cryoglobulinemic vasculitis significant purpuric that started this winter 2024 and progressed such that we restarted her rituximab receiving her 1st infusion 04/15/2024 and her 2nd on 04/29/2024.  In addition she was having some ball of the foot purpuric almost like stone bruises that were showing up some on the dorsum of the foot after walking that she was curious if this was related she is here today to discuss this.    Labs from 04/29/2024 do show a sed rate of 47 consistent with her active vasculitis.  She has no anemia at this time in her C-reactive protein was within normal limits.  Liver and kidney are within normal limits.    3/15/2024 she did have cryoglobulin qualitative showed absent did not trigger the quantitative portion of the test.    Primary Sjogrens:   HCQ 200mg BID   Evoxac 30mg TID typical but 9/2023 we tried salagen    Salagen not preferred despite trial.          No follow-ups on file.      30min consultation with greater than 50% of that time included Preparing to see the patient (review records, tests), Obtaining and/or reviewing separately obtained historical data, Performing a medically appropriate examination and/or evaluation , Ordering medications, tests, and/or procedures, Referring and communicating with other healthcare professionals , Documenting clinical information in the electronic or other health record and Independently interpreting results  (as warranted) & communicating results to the patient/family/caregiver. All questions answered.    Thank you for allowing me to participate  in the care of this very pleasant patient.

## 2024-05-06 NOTE — PATIENT INSTRUCTIONS
Jaswant or similar good sneaker and don't lace tightly.   Thick socks   If still not helping xray and we can try Spenco arch support.       Extensor tendonitis of the foot.

## 2024-05-13 ENCOUNTER — HOSPITAL ENCOUNTER (OUTPATIENT)
Dept: CARDIOLOGY | Facility: HOSPITAL | Age: 44
Discharge: HOME | End: 2024-05-13
Attending: INTERNAL MEDICINE
Payer: COMMERCIAL

## 2024-05-13 DIAGNOSIS — R00.2 HEART PALPITATIONS: ICD-10-CM

## 2024-05-13 PROCEDURE — 93227 XTRNL ECG REC<48 HR R&I: CPT | Mod: ,,, | Performed by: INTERNAL MEDICINE

## 2024-05-13 PROCEDURE — 93226 XTRNL ECG REC<48 HR SCAN A/R: CPT | Mod: PO

## 2024-05-16 LAB
OHS CV EVENT MONITOR DAY: 0
OHS CV HOLTER LENGTH DECIMAL HOURS: 48
OHS CV HOLTER LENGTH HOURS: 48
OHS CV HOLTER LENGTH MINUTES: 0
OHS CV HOLTER SINUS AVERAGE HR: 100
OHS CV HOLTER SINUS MAX HR: 141
OHS CV HOLTER SINUS MIN HR: 66

## 2024-05-20 ENCOUNTER — PROCEDURE VISIT (OUTPATIENT)
Dept: NEUROLOGY | Facility: CLINIC | Age: 44
End: 2024-05-20
Payer: COMMERCIAL

## 2024-05-20 VITALS
WEIGHT: 137 LBS | HEIGHT: 66 IN | DIASTOLIC BLOOD PRESSURE: 90 MMHG | RESPIRATION RATE: 17 BRPM | TEMPERATURE: 99 F | BODY MASS INDEX: 22.02 KG/M2 | SYSTOLIC BLOOD PRESSURE: 132 MMHG | HEART RATE: 102 BPM

## 2024-05-20 DIAGNOSIS — G43.709 CHRONIC MIGRAINE WITHOUT AURA WITHOUT STATUS MIGRAINOSUS, NOT INTRACTABLE: Primary | ICD-10-CM

## 2024-05-20 PROCEDURE — 64615 CHEMODENERV MUSC MIGRAINE: CPT | Mod: S$GLB,,, | Performed by: PHYSICIAN ASSISTANT

## 2024-05-20 RX ORDER — ESTRADIOL 0.05 MG/D
1 PATCH, EXTENDED RELEASE TRANSDERMAL
COMMUNITY
Start: 2024-05-15

## 2024-05-20 NOTE — PROCEDURES
[facility]  [address] [place] [address]  Phone:[phone]  Fax: [phone]  Botox Visit, #2    Chief Complaint   Patient presents with    Botulinum Toxin Injection         A/P:        ICD-10-CM ICD-9-CM   1. Chronic migraine without aura without status migrainosus, not intractable  G43.709 346.70     Botox for migraine    Historically: Patient meets the criteria for chronic migraine. In summary, She has headaches/migraines >15 days per month  and last >4 hours if untreated. Specifics of duration, frequency and strength are listed in office visit HPI's.  This pattern has continued for >3 months.  She has failed at least three preventive medications (full list of medications is listed in office notes of Therapies tried in past)  I have therefore recommended a trial of Botox via the PREEMPT protocol for migraine prophylaxis.We schedule regular follow up intervals to check on status.     PROCEDURE NOTE:  BOTOX injection is indicated for the prophylaxis of headaches in adult patients with chronic  migraine. Patient meets indications for BOTOX therapy.  Potential risks and benefits were reviewed. Side effects including, but not limited to, potential  systemic allergic reactions of the anaphylactic type as well as local injection site reactions of  blepharoptosis, diplopia, infection, bleeding, pain, redness and bruising were reviewed. The  potential for headaches and/or neck pain post procedure were reviewed.  The patient's questions were answered. The patient signed a consent form. Patient  understands that depending on their insurance carrier, there may be a copay for this treatment.  BOTOX was reconstituted using  two 100 unit vials and diluted with 4 mL of sterile saline.  BOTOX was injected as per the PREEMPT trial injection paradigm with dose administered as 5  unit intramuscular (IM) injections per site using a sterile, 30-gauge 0.5 inch needle as  follows:  Muscle Dose, # of Sites   10 units divided in 2 sites  Procerus 5 units in 1 site  Frontalis 20 units divided in 4 sites  Temporalis 40 units divided in 8 sites  Occipitalis 30 units divided in 6 sites  Cervical paraspinal 20 units divided in 4 sites  Trapezius 30 units divided in 6 sites  Each site was cleaned with alcohol prior to injection. A total dose of 155 units were injected. 45  units were discarded/wasted.      The patient tolerated the procedure well with no immediate complications.  MEDICATION INFO:  NDC 0586-8315-81   Lot # [id number]  Exp04/2026    As aside: The Botox injections have achieved well over 50%  improvement in the patient's symptoms. Migraines have been reduced at least 7 days per month and the number of cumulative hours suffering with headaches has been reduced at least 100 total hours per month.  Frequency of treatment is every 3 months unless no response to the treatments, at which time we will discontinue the injections.           Follow up in 3 months for repeat injection, we also have interspersed office visits scheduled to ensure efficacy of botox sessions/check on patient.       Eduar Serra MPA, PA-C  Attending available-Dr Daniel MD           Personal Protective Equipment:    Personal Protective Equipment was used during this encounter including;  non latex gloves.     05/20/2024      CC: Julia Sargent MD

## 2024-05-20 NOTE — PROCEDURES
[facility]  [address] [place] [address]  Phone:[phone]  Fax: [phone]  Botox Visit, #2    Chief Complaint   Patient presents with    Botulinum Toxin Injection         A/P:        ICD-10-CM ICD-9-CM   1. Chronic migraine without aura without status migrainosus, not intractable  G43.709 346.70     Botox for migraine    Historically: Patient meets the criteria for chronic migraine. In summary, She has headaches/migraines >15 days per month  and last >4 hours if untreated. Specifics of duration, frequency and strength are listed in office visit HPI's.  This pattern has continued for >3 months.  She has failed at least three preventive medications (full list of medications is listed in office notes of Therapies tried in past)  I have therefore recommended a trial of Botox via the PREEMPT protocol for migraine prophylaxis.We schedule regular follow up intervals to check on status.     PROCEDURE NOTE:  BOTOX injection is indicated for the prophylaxis of headaches in adult patients with chronic  migraine. Patient meets indications for BOTOX therapy.  Potential risks and benefits were reviewed. Side effects including, but not limited to, potential  systemic allergic reactions of the anaphylactic type as well as local injection site reactions of  blepharoptosis, diplopia, infection, bleeding, pain, redness and bruising were reviewed. The  potential for headaches and/or neck pain post procedure were reviewed.  The patient's questions were answered. The patient signed a consent form. Patient  understands that depending on their insurance carrier, there may be a copay for this treatment.  BOTOX was reconstituted using  two 100 unit vials and diluted with 4 mL of sterile saline.  BOTOX was injected as per the PREEMPT trial injection paradigm with dose administered as 5  unit intramuscular (IM) injections per site using a sterile, 30-gauge 0.5 inch needle as  follows:  Muscle Dose, # of Sites   10 units divided in 2 sites  Procerus 5 units in 1 site  Frontalis 20 units divided in 4 sites  Temporalis 40 units divided in 8 sites  Occipitalis 30 units divided in 6 sites  Cervical paraspinal 20 units divided in 4 sites  Trapezius 30 units divided in 6 sites  Each site was cleaned with alcohol prior to injection. A total dose of 155 units were injected. 45  units were discarded/wasted.      The patient tolerated the procedure well with no immediate complications.  MEDICATION INFO:  NDC 8645-0972-16   Lot # [id number]  Exp 04/2026         Follow up in 3 months for repeat injection, we also have interspersed office visits scheduled to ensure efficacy of botox sessions/check on patient.       Eduar Serra MPA, PA-C  Attending available-Dr Daniel MD           Personal Protective Equipment:    Personal Protective Equipment was used during this encounter including;  non latex gloves.     05/20/2024      CC: Julia Sargent MD

## 2024-06-03 DIAGNOSIS — M06.4 INFLAMMATORY POLYARTHRITIS: ICD-10-CM

## 2024-06-03 DIAGNOSIS — M35.00 SJOGREN'S SYNDROME WITHOUT EXTRAGLANDULAR INVOLVEMENT: ICD-10-CM

## 2024-06-03 DIAGNOSIS — D89.1 CRYOGLOBULINEMIC VASCULITIS: ICD-10-CM

## 2024-06-06 ENCOUNTER — PATIENT MESSAGE (OUTPATIENT)
Dept: RHEUMATOLOGY | Facility: CLINIC | Age: 44
End: 2024-06-06
Payer: COMMERCIAL

## 2024-06-06 RX ORDER — TRAMADOL HYDROCHLORIDE 50 MG/1
50 TABLET ORAL EVERY 12 HOURS PRN
Qty: 40 TABLET | Refills: 3 | Status: SHIPPED | OUTPATIENT
Start: 2024-06-06

## 2024-06-25 ENCOUNTER — PATIENT MESSAGE (OUTPATIENT)
Dept: RHEUMATOLOGY | Facility: CLINIC | Age: 44
End: 2024-06-25
Payer: COMMERCIAL

## 2024-06-25 DIAGNOSIS — U07.1 COVID: Primary | ICD-10-CM

## 2024-06-27 ENCOUNTER — PATIENT OUTREACH (OUTPATIENT)
Dept: RHEUMATOLOGY | Facility: CLINIC | Age: 44
End: 2024-06-27
Payer: COMMERCIAL

## 2024-06-27 DIAGNOSIS — U07.1 COVID: Primary | ICD-10-CM

## 2024-06-27 DIAGNOSIS — D84.9 IMMUNOSUPPRESSION: ICD-10-CM

## 2024-06-27 PROCEDURE — 99358 PROLONG SERVICE W/O CONTACT: CPT | Mod: S$GLB,,, | Performed by: INTERNAL MEDICINE

## 2024-06-27 NOTE — PROGRESS NOTES
+COVID immuncompromised patient  Last Rituxan: 4/29/2024.   ER precautions given  Start Monupiravir- medication check with Lolita interaction not a Paxlovid candidate likely with nurtec/buspirone.

## 2024-06-28 RX ORDER — NIRMATRELVIR AND RITONAVIR 300-100 MG
KIT ORAL
Qty: 30 TABLET | Refills: 0 | Status: SHIPPED | OUTPATIENT
Start: 2024-06-28 | End: 2024-07-03

## 2024-08-12 ENCOUNTER — PROCEDURE VISIT (OUTPATIENT)
Dept: NEUROLOGY | Facility: CLINIC | Age: 44
End: 2024-08-12
Payer: COMMERCIAL

## 2024-08-12 VITALS
HEIGHT: 66 IN | HEART RATE: 90 BPM | SYSTOLIC BLOOD PRESSURE: 103 MMHG | WEIGHT: 136.88 LBS | BODY MASS INDEX: 22 KG/M2 | TEMPERATURE: 98 F | DIASTOLIC BLOOD PRESSURE: 68 MMHG | RESPIRATION RATE: 17 BRPM

## 2024-08-12 DIAGNOSIS — G43.709 CHRONIC MIGRAINE WITHOUT AURA WITHOUT STATUS MIGRAINOSUS, NOT INTRACTABLE: Primary | ICD-10-CM

## 2024-08-12 PROCEDURE — 64615 CHEMODENERV MUSC MIGRAINE: CPT | Mod: S$GLB,,, | Performed by: PHYSICIAN ASSISTANT

## 2024-08-12 NOTE — PROCEDURES
[facility]  [facility]  [address] [place] [address]  Phone:[phone]  Fax: [phone]  Botox Visit, #3    Chief Complaint   Patient presents with    Botulinum Toxin Injection         A/P:        ICD-10-CM ICD-9-CM   1. Chronic migraine without aura without status migrainosus, not intractable  G43.709 346.70     Botox for migraine    Historically: Patient meets the criteria for chronic migraine. In summary, She has headaches/migraines >15 days per month  and last >4 hours if untreated. Specifics of duration, frequency and strength are listed in office visit HPI's.  This pattern has continued for >3 months.  She has failed at least three preventive medications (full list of medications is listed in office notes of Therapies tried in past)  I have therefore recommended a trial of Botox via the PREEMPT protocol for migraine prophylaxis.We schedule regular follow up intervals to check on status.     PROCEDURE NOTE:  BOTOX injection is indicated for the prophylaxis of headaches in adult patients with chronic  migraine. Patient meets indications for BOTOX therapy.  Potential risks and benefits were reviewed. Side effects including, but not limited to, potential  systemic allergic reactions of the anaphylactic type as well as local injection site reactions of  blepharoptosis, diplopia, infection, bleeding, pain, redness and bruising were reviewed. The  potential for headaches and/or neck pain post procedure were reviewed.  The patient's questions were answered. The patient signed a consent form. Patient  understands that depending on their insurance carrier, there may be a copay for this treatment.  BOTOX was reconstituted using  two 100 unit vials and diluted with 4 mL of sterile saline.  BOTOX was injected as per the PREEMPT trial injection paradigm with dose administered as 5  unit intramuscular (IM) injections per site using a sterile, 30-gauge 0.5 inch needle as  follows:  Muscle Dose, # of Sites   10 units divided in 2 sites  Procerus 5 units in 1 site  Frontalis 20 units divided in 4 sites  Temporalis 40 units divided in 8 sites  Occipitalis 30 units divided in 6 sites  Cervical paraspinal 20 units divided in 4 sites  Trapezius 30 units divided in 6 sites  Each site was cleaned with alcohol prior to injection. A total dose of 155 units were injected. 45  units were discarded/wasted.      The patient tolerated the procedure well with no immediate complications.  MEDICATION INFO:  NDC 4834-6279-09   Lot # [id number]  Exp 07/2026    As aside: The Botox injections have achieved well over 50%  improvement in the patient's symptoms. Migraines have been reduced at least 7 days per month and the number of cumulative hours suffering with headaches has been reduced at least 100 total hours per month.  Frequency of treatment is every 3 months unless no response to the treatments, at which time we will discontinue the injections.           Follow up in 3 months for repeat injection, we also have interspersed office visits scheduled to ensure efficacy of botox sessions/check on patient.       Eduar Serra MPA, PA-C  Attending available-Dr Daniel MD           Personal Protective Equipment:    Personal Protective Equipment was used during this encounter including;  non latex gloves.     08/12/2024      CC: Julia Sargent MD

## 2024-08-30 ENCOUNTER — LAB VISIT (OUTPATIENT)
Dept: LAB | Facility: HOSPITAL | Age: 44
End: 2024-08-30
Attending: INTERNAL MEDICINE
Payer: COMMERCIAL

## 2024-08-30 DIAGNOSIS — M35.00 SJOGREN'S SYNDROME WITHOUT EXTRAGLANDULAR INVOLVEMENT: ICD-10-CM

## 2024-08-30 DIAGNOSIS — D89.1 CRYOGLOBULINEMIC VASCULITIS: ICD-10-CM

## 2024-08-30 DIAGNOSIS — I73.00 RAYNAUD'S PHENOMENON WITHOUT GANGRENE: ICD-10-CM

## 2024-08-30 LAB
ALBUMIN SERPL BCP-MCNC: 3.5 G/DL (ref 3.5–5.2)
ALP SERPL-CCNC: 49 U/L (ref 55–135)
ALT SERPL W/O P-5'-P-CCNC: 18 U/L (ref 10–44)
ANION GAP SERPL CALC-SCNC: 8 MMOL/L (ref 8–16)
AST SERPL-CCNC: 17 U/L (ref 10–40)
BASOPHILS # BLD AUTO: 0.04 K/UL (ref 0–0.2)
BASOPHILS NFR BLD: 0.8 % (ref 0–1.9)
BILIRUB SERPL-MCNC: 0.3 MG/DL (ref 0.1–1)
BUN SERPL-MCNC: 15 MG/DL (ref 6–20)
CALCIUM SERPL-MCNC: 9 MG/DL (ref 8.7–10.5)
CHLORIDE SERPL-SCNC: 108 MMOL/L (ref 95–110)
CO2 SERPL-SCNC: 22 MMOL/L (ref 23–29)
CREAT SERPL-MCNC: 0.9 MG/DL (ref 0.5–1.4)
CRP SERPL-MCNC: 1.6 MG/L (ref 0–8.2)
DIFFERENTIAL METHOD BLD: ABNORMAL
EOSINOPHIL # BLD AUTO: 0.1 K/UL (ref 0–0.5)
EOSINOPHIL NFR BLD: 2.8 % (ref 0–8)
ERYTHROCYTE [DISTWIDTH] IN BLOOD BY AUTOMATED COUNT: 13.8 % (ref 11.5–14.5)
ERYTHROCYTE [SEDIMENTATION RATE] IN BLOOD BY PHOTOMETRIC METHOD: 7 MM/HR (ref 0–36)
EST. GFR  (NO RACE VARIABLE): >60 ML/MIN/1.73 M^2
GLUCOSE SERPL-MCNC: 90 MG/DL (ref 70–110)
HCT VFR BLD AUTO: 38 % (ref 37–48.5)
HGB BLD-MCNC: 12.5 G/DL (ref 12–16)
IMM GRANULOCYTES # BLD AUTO: 0.01 K/UL (ref 0–0.04)
IMM GRANULOCYTES NFR BLD AUTO: 0.2 % (ref 0–0.5)
LYMPHOCYTES # BLD AUTO: 1.3 K/UL (ref 1–4.8)
LYMPHOCYTES NFR BLD: 26.6 % (ref 18–48)
MCH RBC QN AUTO: 31.6 PG (ref 27–31)
MCHC RBC AUTO-ENTMCNC: 32.9 G/DL (ref 32–36)
MCV RBC AUTO: 96 FL (ref 82–98)
MONOCYTES # BLD AUTO: 0.7 K/UL (ref 0.3–1)
MONOCYTES NFR BLD: 15 % (ref 4–15)
NEUTROPHILS # BLD AUTO: 2.7 K/UL (ref 1.8–7.7)
NEUTROPHILS NFR BLD: 54.6 % (ref 38–73)
NRBC BLD-RTO: 0 /100 WBC
PLATELET # BLD AUTO: 263 K/UL (ref 150–450)
PMV BLD AUTO: 12 FL (ref 9.2–12.9)
POTASSIUM SERPL-SCNC: 4.4 MMOL/L (ref 3.5–5.1)
PROT SERPL-MCNC: 7.1 G/DL (ref 6–8.4)
RBC # BLD AUTO: 3.96 M/UL (ref 4–5.4)
SODIUM SERPL-SCNC: 138 MMOL/L (ref 136–145)
WBC # BLD AUTO: 4.93 K/UL (ref 3.9–12.7)

## 2024-08-30 PROCEDURE — 86140 C-REACTIVE PROTEIN: CPT | Performed by: INTERNAL MEDICINE

## 2024-08-30 PROCEDURE — 85652 RBC SED RATE AUTOMATED: CPT | Performed by: INTERNAL MEDICINE

## 2024-08-30 PROCEDURE — 85651 RBC SED RATE NONAUTOMATED: CPT | Mod: PO | Performed by: INTERNAL MEDICINE

## 2024-08-30 PROCEDURE — 85025 COMPLETE CBC W/AUTO DIFF WBC: CPT | Performed by: INTERNAL MEDICINE

## 2024-08-30 PROCEDURE — 36415 COLL VENOUS BLD VENIPUNCTURE: CPT | Mod: PO | Performed by: INTERNAL MEDICINE

## 2024-08-30 PROCEDURE — 80053 COMPREHEN METABOLIC PANEL: CPT | Performed by: INTERNAL MEDICINE

## 2024-09-09 ENCOUNTER — TELEPHONE (OUTPATIENT)
Dept: RHEUMATOLOGY | Facility: CLINIC | Age: 44
End: 2024-09-09
Payer: COMMERCIAL

## 2024-09-10 ENCOUNTER — OFFICE VISIT (OUTPATIENT)
Dept: RHEUMATOLOGY | Facility: CLINIC | Age: 44
End: 2024-09-10
Payer: COMMERCIAL

## 2024-09-10 ENCOUNTER — PATIENT MESSAGE (OUTPATIENT)
Dept: RHEUMATOLOGY | Facility: CLINIC | Age: 44
End: 2024-09-10
Payer: COMMERCIAL

## 2024-09-10 DIAGNOSIS — Z79.899 HIGH RISK MEDICATIONS (NOT ANTICOAGULANTS) LONG-TERM USE: ICD-10-CM

## 2024-09-10 DIAGNOSIS — D84.9 IMMUNOSUPPRESSION: ICD-10-CM

## 2024-09-10 DIAGNOSIS — M06.4 INFLAMMATORY POLYARTHRITIS: ICD-10-CM

## 2024-09-10 DIAGNOSIS — D89.1 CRYOGLOBULINEMIC VASCULITIS: Primary | ICD-10-CM

## 2024-09-10 DIAGNOSIS — M35.00 SJOGREN'S SYNDROME WITHOUT EXTRAGLANDULAR INVOLVEMENT: ICD-10-CM

## 2024-09-10 PROCEDURE — 99215 OFFICE O/P EST HI 40 MIN: CPT | Mod: 95,,, | Performed by: INTERNAL MEDICINE

## 2024-09-10 NOTE — PROGRESS NOTES
"  Subjective:          Chief Complaint: Shamika Young is a 43 y.o. female who had no chief complaint listed for this encounter.    HPI: Primary Sjogren's w/ cryoglobulinemic vasculitis.  (++RF, +SSA, +SSB, +CHELSEA)     Interval events:      9/2024: patient doing well with remote work accommodation, temperature restrictions if daily temp drops below 50 degrees she does not have to go in.   She is overall doing well since restart RTX, few purpura but far less, some hives. She still develops some as she stands for longer. No CP, no SOB.   She did have COVID recently, but recovering.       5/2024: With a history of cryoglobulinemic vasculitis significant purpuriathat started this winter 2024 and progressed such that we restarted her rituximab receiving her 1st infusion 04/15/2024 and her 2nd on 04/29/2024. More recently,  she is having some brusing about the heal and dorsum fo the foot with swelling that is painful x few days.  almost like stone bruises that were occurring after walking that she was curious if this was related. she is here today to discuss this. No hx of similar pain, no pitting, +pain.     Labs from 04/29/2024 do show a sed rate of 47 consistent with her active vasculitis.  She has no anemia at this time in her C-reactive protein was within normal limits.  Liver and kidney are within normal limits.    3/15/2024 she did have cryoglobulin qualitative showed absent did not trigger the quantitative portion of the test.  Patient has included various images which I have reviewed.  1/2024: doing rather well considering winter weather. Some mottlling with red spots at times, no petechia that persist. No open sores. Renal function WNL.   Cryos 11/2023 were trace. Needs HCQ eye exam. Prefers evoxac to salagen. Working with [facility] clinic avoiding triptans       9/2023: Patient doing "ok" having fatigue. Noting urticaria and petechia none that opened. Seems to flare with stress and improved now lasted about 5 days.  " Legs only.  Last RTX was 3/2023.   We elected to hold on RTX   But did start her on MTX- she started MTX at 10mg x 2 weeks with malaise, nausea for about 3 days felt she would never tolerate this.   She is noting dryness of her eyes and mouth: evoxac still help. Salagen triggered.          6/2023:   Doing better with warm weather.   Fatigue continues w/ stress. She flares with travel. Flares with viral infections.   Continues: with mottleing but no petechial rash.   No numbness or tingling.    She notes some rashes with dusky changes at the knee exacerbates with stress.     Patient has been doing well overall since last RTX (Ruxience) 8/2022.   Patient started in mid Jan with urticarial rashes, livedo and petechia in bilateral LE. We elected to repeat Ruxience (RTX)- Last Ruxience (RTX) 3/13/2023  Pins in needles in forearm but this appears to be positional. Trying to adjust sleep and desk  + Fatigue as well.   Raynauds always worse in winter.   No SOB/LÓPEZ no hemoptysis.     Continues on    Rituxan periodically based on Cryos, symptoms and lab markers.  Last infusion   HCQ 200mg BID- last eye 2021. Dr. Glover no maculopathy will need to call.   Prednisone 5mg- only uses in pulse tapers (20-30mg few days)  Component      Latest Ref Rng & Units 5/8/2023 2/8/2023 2/8/2023 4/25/2022           12:06 PM 12:06 PM    Anti Sm Antibody      0.00 - 0.99 Ratio  0.12     Anti-Sm Interpretation      Negative  Negative     Anti-SSA Antibody      0.00 - 0.99 Ratio  3.94 (H) 3.94 (H)    Anti-SSA Interpretation      Negative  Positive (A) Positive (A)    Anti-SSB Antibody      0.00 - 0.99 Ratio  3.14 (H) 3.14 (H)    Anti-SSB Interpretation      Negative  Positive (A) Positive (A)    ds DNA Ab      Negative 1:10  Negative 1:10     Anti Sm/RNP Antibody      0.00 - 0.99 Ratio  0.22     Anti-Sm/RNP Interpretation      Negative  Negative     Rheumatoid Factor      0.0 - 15.0 IU/mL       Cryoglobulin, Qual      Absent   Absent Absent    CHELSEA Screen      Negative <1:80   Positive (A)    CHELSEA PATTERN 1         Speckled    CHELSEA Titer 1         1:640    Sed Rate      0 - 36 mm/Hr 5      CRP      0.0 - 8.2 mg/L 1.0        Component      Latest Ref Rng & Units 3/3/2020             Anti Sm Antibody      0.00 - 0.99 Ratio    Anti-Sm Interpretation      Negative    Anti-SSA Antibody      0.00 - 0.99 Ratio 4.67 (H)   Anti-SSA Interpretation      Negative Positive (A)   Anti-SSB Antibody      0.00 - 0.99 Ratio 2.40 (H)   Anti-SSB Interpretation      Negative Positive (A)   ds DNA Ab      Negative 1:10    Anti Sm/RNP Antibody      0.00 - 0.99 Ratio    Anti-Sm/RNP Interpretation      Negative    Rheumatoid Factor      0.0 - 15.0 IU/mL 484.0 (H)   Cryoglobulin, Qual      Absent    CHELSEA Screen      Negative <1:80    CHELSEA PATTERN 1          CHELSEA Titer 1          Sed Rate      0 - 36 mm/Hr    CRP      0.0 - 8.2 mg/L        Rheum Hx:   Patient is a 39-year-old female being referred by Dr. Chopra with hx of cryoglobulinemia vasculitis and Primary Sjogren's.    Patient seen at [facility]. Ultimately diagnosed [id number].  She states rash started during pregnancy of legs started with purpura of legs. She then developed more diffuse coalescing purpura but no open sores. No renal or respiratory disease to date.   Patient did have skin bx: LCV  Previously hypocomplementemic.   She did note fewer skin purpuric lesions after 2 cycles of RTX   She has noted more urticaria with wheal in the skin,  some reactions with food, wheat/rice seem to be worst. She notes no new soaps or detergents. Present since 2017.   She has seen allergist with some nut and environmental allergies no changes present since childhood. - working with DR. Curtis.   Dry eyes present but tolerates contacts.   Dry mouth more problematic.   Joints fluctuate between 6-9/10. Hand and wrist predominant.   No real Raynauds in triphasic pattern but cold hands and feet.   Patient c/o swelling of lips at  times-no known food allergies.       She is noting more livedo and mottling in legs w/o activity, ++ fatigue increasing. She does note during flares some joint pains. 2-5 days every 2 weeks. Flares seem to be ocurring every 2 weeks.   She notes working from home has helped with overall fatigue/pain.   No numbness or tingling.     PFTs at [facility] were reportedly normal 7/2019 , no hemoptysis or SOB  Thinks she had CT chest.   No bone marrow bx.   No DVT, miscarriages, seizures or strokes.   No renal disease.        She is on hydroxychloroquine 200 mg twice daily.  Rituxan last dose 7/2019  cevimiline 30mg TID  Prednisone 10mg once daily PRN ( during flare daily rest is periodical.   Rituximab last dose 7/2019 (x 3 treatments 6 months apart. )     OTC:   Xylimelts.   Not using biotene.   sugarfree lemon  gylcerin lozenges.     Previous medications: Salagen with ASE with [facility].   No hx of hepatitis, no malignancy in self to date.   Component      Latest Ref Rng & Units 3/3/2020   Anti-SSA Antibody      0.00 - 0.99 Ratio 4.67 (H)   Anti-SSA Interpretation      Negative Positive (A)   Anti-SSB Antibody      0.00 - 0.99 Ratio 2.40 (H)   Anti-SSB Interpretation      Negative Positive (A)   Anti Sm Antibody      0.00 - 0.99 Ratio 0.07   Anti-Sm Interpretation      Negative Negative   Anti Sm/RNP Antibody      0.00 - 0.99 Ratio 0.17   Anti-Sm/RNP Interpretation      Negative Negative   Rheumatoid Factor      0.0 - 15.0 IU/mL 484.0 (H)   CCP Antibodies      <5.0 U/mL <0.5   CHELSEA Screen      Negative <1:80 Positive (A)   ds DNA Ab      Negative 1:10 Negative 1:10   CHELSEA HEP-2 Titer       Positive >=1:2560 Speckled         REVIEW OF SYSTEMS:    Review of Systems   Constitutional:  Negative for fever, malaise/fatigue and weight loss.   HENT:  Negative for sore throat.    Eyes:  Positive for pain and redness. Negative for double vision and photophobia.   Respiratory:  Negative for cough, shortness of breath and wheezing.     Cardiovascular:  Negative for chest pain, palpitations and orthopnea.   Gastrointestinal:  Negative for abdominal pain, constipation and diarrhea.   Genitourinary:  Negative for dysuria, hematuria and urgency.   Musculoskeletal:  Positive for joint pain. Negative for back pain and myalgias.   Skin:  Positive for rash (cryos with petechia one episode).   Neurological:  Positive for headaches. Negative for dizziness, tingling and focal weakness.   Endo/Heme/Allergies:  Does not bruise/bleed easily.   Psychiatric/Behavioral:  Negative for depression, hallucinations and suicidal ideas.                Objective:            Past Medical History:   Diagnosis Date    Allergy     previously on immunotherapy    Anxiety     Bleeding disorder     Depression     Facial trauma     Food allergy     Headache     Lumbago with sciatica, right side     Lumbar disc disease     L4-L5    Migraine headache     Osteoarthritis     Other chronic pain     Panic disorder     Positive CHELSEA (antinuclear antibody)     Purpura     Seasonal allergies     Sjogren's syndrome     Vasculitis      Family History   Problem Relation Name Age of Onset    Multiple sclerosis Mother Kelley Maynor     Heart disease Mother Kelley Maynor     Diabetes Mother Kelley Maynor     Hypertension Mother Kelley Maynor     Heart failure Mother Kelley Maynor     Migraines Mother Kelley Maynor     Heart disease Father Gene Maynor     Hyperlipidemia Father Gene Maynor     Heart failure Father Gene Maynor     Migraines Sister Phyllis Ramos     Glaucoma Neg Hx      Retinal detachment Neg Hx      Macular degeneration Neg Hx       Social History     Tobacco Use    Smoking status: Former    Smokeless tobacco: Never   Substance Use Topics    Alcohol use: Yes     Alcohol/week: 2.0 standard drinks of alcohol     Types: 2 Drinks containing 0.5 oz of alcohol per week     Comment: socially    Drug use: Never         Current Outpatient Medications on File Prior to Visit    Medication Sig Dispense Refill    ALPRAZolam (XANAX) 0.5 MG tablet Take 0.5 mg by mouth daily as needed.      azelastine (ASTELIN) 137 mcg (0.1 %) nasal spray 1 spray (137 mcg total) by Nasal route 2 (two) times daily. 30 mL 0    berberine/herbal complex no.18 (BERBERINE-HERBAL COMB NO.18 ORAL) Take by mouth once daily.      buPROPion (WELLBUTRIN) 75 MG tablet Take 75 mg by mouth once daily.      bupropion HBr (APLENZIN ORAL)       busPIRone (BUSPAR) 10 MG tablet Take 10 mg by mouth 3 (three) times daily.      cevimeline (EVOXAC) 30 mg capsule Take 1 capsule (30 mg total) by mouth 3 (three) times daily. 90 capsule 11    EPINEPHrine (EPIPEN 2-CA) 0.3 mg/0.3 mL AtIn Inject 0.6 mLs (0.6 mg total) into the muscle once. for 1 dose 0.6 mL 0    ergocalciferol, vitamin D2, (VITAMIN D ORAL) Take by mouth.      hydroxychloroquine (PLAQUENIL) 200 mg tablet Take 1 tablet (200 mg total) by mouth 2 (two) times a day. 60 tablet 11    Lactobac no.41/Bifidobact no.7 (PROBIOTIC-10 ORAL) Take by mouth. bromoline      LYLLANA 0.05 mg/24 hr 1 patch twice a week.      methylphenidate HCl (RITALIN) 20 MG tablet Take 20 mg by mouth once daily.      montelukast (SINGULAIR) 10 mg tablet TAKE 1 TABLET(10 MG) BY MOUTH EVERY EVENING 90 tablet 3    predniSONE (DELTASONE) 5 MG tablet TAKE 1 TO 4 TABLETS BY MOUTH DAILY AS NEEDED(SJOGRENS/CRYOGLBULINEMIA). MAY TAKE FOR FLARES AS NEEDED 75 tablet 3    progesterone (PROMETRIUM) 100 MG capsule Take 1 capsule every day by oral route at bedtime for 30 days.      rimegepant (NURTEC) 75 mg odt Dissolve 1 tablet by mouth every other day for migraine prevention 15 tablet 6    traMADoL (ULTRAM) 50 mg tablet Take 1 tablet (50 mg total) by mouth every 12 (twelve) hours as needed for Pain (joint pains dx Primary Sjogrens with vasculitis.). 40 tablet 3    VYVANSE 30 mg capsule Take 30 mg by mouth nightly.       No current facility-administered medications on file prior to visit.       There were no vitals  filed for this visit.        Physical Exam:    Physical Exam  Constitutional:       Appearance: She is well-developed.   Eyes:      General: Lids are normal.   Skin:     Comments: Legs with some mottling and livedo.    Neurological:      Mental Status: She is oriented to person, place, and time.   Psychiatric:         Behavior: Behavior normal.         Thought Content: Thought content normal.               Assessment:       Encounter Diagnoses   Name Primary?    Cryoglobulinemic vasculitis Yes    Inflammatory polyarthritis     Sjogren's syndrome without extraglandular involvement     Immunosuppression     High risk medications (not anticoagulants) long-term use               Plan:        Cryoglobulinemic vasculitis    Inflammatory polyarthritis    Sjogren's syndrome without extraglandular involvement    Immunosuppression    High risk medications (not anticoagulants) long-term use           Cryoglobulinemic vasculitis treated with Rituxan last 3/2023. (We elected to hold due to infections, but insurance was beginning to deny auth on RTX)   HCQ 200mg BID   Evoxac 30mg TID   Do not send to [facility] we prefer [facility] for Cryo labs.    Tramadol PRN    Prednisone 5mg daily, may increase to 10 PRN flares. Only used perhaps 3 times in the last months.    Hep and T. Spot neg 2020    Failed MTX 6/2023 x 2 weeks with malaise and nausea.   With a history of cryoglobulinemic vasculitis significant purpuric that started this winter 2024 and progressed such that we restarted her rituximab receiving her 1st infusion 04/15/2024 and her 2nd on 04/29/2024.  In addition she was having some ball of the foot purpuric almost like stone bruises that were showing up some on the dorsum of the foot after walking that she was curious if this was related she is here today to discuss this.    Labs from 04/29/2024 do show a sed rate of 47 consistent with her active vasculitis.  She has no anemia at this time in her C-reactive protein was within  normal limits.  Liver and kidney are within normal limits.  Labs 8/30/24: ESR and CRP completely normal.     3/15/2024 she did have cryoglobulin qualitative showed absent did not trigger the quantitative portion of the test.    Primary Sjogrens:   HCQ 200mg BID   Evoxac 30mg TID typical but 9/2023 we tried salagen    Salagen not preferred despite trial.          No follow-ups on file.  The patient location is: Home [place]  The chief complaint leading to consultation is: medication management and f/u   Visit type: Virtual visit with synchronous audio and video  Total time spent with patient: 40  Each patient to whom he or she provides medical services by telemedicine is:  (1) informed of the relationship between the physician and patient and the respective role of any other health care provider with respect to management of the patient; and (2) notified that he or she may decline to receive medical services by telemedicine and may withdraw from such care at any time.    Notes:       40min consultation with greater than 50% of that time included Preparing to see the patient (review records, tests), Obtaining and/or reviewing separately obtained historical data, Performing a medically appropriate examination and/or evaluation , Ordering medications, tests, and/or procedures, Referring and communicating with other healthcare professionals , Documenting clinical information in the electronic or other health record and Independently interpreting results  (as warranted) & communicating results to the patient/family/caregiver. All questions answered.    Thank you for allowing me to participate in the care of this very pleasant patient.

## 2024-09-19 ENCOUNTER — PATIENT MESSAGE (OUTPATIENT)
Dept: FAMILY MEDICINE | Facility: CLINIC | Age: 44
End: 2024-09-19
Payer: COMMERCIAL

## 2024-09-19 ENCOUNTER — TELEPHONE (OUTPATIENT)
Dept: FAMILY MEDICINE | Facility: CLINIC | Age: 44
End: 2024-09-19
Payer: COMMERCIAL

## 2024-09-19 NOTE — TELEPHONE ENCOUNTER
----- Message from Karrie Ross sent at 9/19/2024 10:31 AM CDT -----  Type:   Call    Who Called:pt  Does the patient know what this is regarding?:Test  Would the patient rather a call back or a response via [facility]? call  Best Call Back Number: [phone]  Additional Information:

## 2024-10-01 ENCOUNTER — OFFICE VISIT (OUTPATIENT)
Dept: NEUROLOGY | Facility: CLINIC | Age: 44
End: 2024-10-01
Payer: COMMERCIAL

## 2024-10-01 DIAGNOSIS — G43.709 CHRONIC MIGRAINE WITHOUT AURA WITHOUT STATUS MIGRAINOSUS, NOT INTRACTABLE: Primary | ICD-10-CM

## 2024-10-01 PROCEDURE — 99213 OFFICE O/P EST LOW 20 MIN: CPT | Mod: 95,,, | Performed by: PHYSICIAN ASSISTANT

## 2024-10-01 NOTE — PROGRESS NOTES
[facility]  [address] [facility] [address]  [place] [address]  Phone:[phone]  Fax: [phone]   Follow up visit -telemed    Provider Location: Work         Name of Location: [facility]  City: [place]   State: [place]  Medium to connect: Video  Home: [place]                                                      State: [place]                                         Consent for Electronic Treatment:   This visit was conducted with the use of an interactive audio and/or video telecommunications system that permits real-time communication between the patient and this provider. The patient has submitted their consent to be treated electronically by way of this telephone and/or video technology.  The risks and limitations of the process of telemedicine have been conveyed verbally during this encounter.Each patient to whom he or she provides medical services by telemedicine is:  (1) informed of the relationship between the physician and patient and the respective role of any other health care provider with respect to management of the patient; and (2) notified that he or she may decline to receive medical services by telemedicine and may withdraw from such care at any time.    Face to Face time with patient:   10 minutes of total time spent on the encounter, which includes face to face time and non-face to face time preparing to see the patient (eg, review of tests), Obtaining and/or reviewing separately obtained history, Documenting clinical information in the electronic or other health record, Independently interpreting results (not separately reported) and communicating results to the patient/family/caregiver, or Care coordination (not separately reported).         Patient Name: Shamika Young  : 1980  MRN:  [id number]  Today: 10/1/2024   LOV: 2024  for botox 3  chief complaint: Migraine      PCP: Julia Sargent MD.    Assessment:   Shamika Young is a 43 y.o.  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  DUFFY historically chronic migrainous, better since starting botox.     Review:    ICD-10-CM ICD-9-CM   1. Chronic migraine without aura without status migrainosus, not intractable  G43.709 346.70                   Plan:        For HA Prevention:     1) Patient meets the criteria for chronic migraine. In summary, She has  migraines >15 days per month  and last >4 hours if untreated. Specifics of duration, frequency and strength are listed in the HPI (please refer to this section).  This pattern has continued for >3 months.  She has failed at least three preventive medications (full list of medications is listed below in the HPI under Therapies tried in past, but for ease of reference Vyvanse, wellbutrin, buspar, depakote, tizanidine, gabapentin, lyrica, qulipta, emgality, buspar, etc )  I have therefore recommended a trial of Botox via the PREEMPT protocol for migraine prophylaxis.   We discussed what to expect on procedure day at length, wear old or loose fitting clothing (if possible, merely to keep work clothes from getting any blood or being wrinkled), no make up (if applicable), eat meals/stay well hydrated and secure a ride if necessary. Also, discussed it can take up to 2-3 sessions of botox to get results desired. Lastly, we discussed procedure at length, 31 injections done in the office, potential complications not limited to muscle weakness, respiratory issues, or worst case scenario-death. The patient voiced understanding and wished to move forward.  Muscles to be injected:   10 units divided in 2 sites  Procerus 5 units in 1 site  Frontalis 20 units divided in 4 sites  Temporalis 40 units divided in 8 sites  Occipitalis 30 units divided in 6 sites  Cervical paraspinal 20 units divided in 4 sites  Trapezius 30 units  "divided in 6 sites  A total dose of 155 units of botox to be used with  45 units to be discarded/wasted (unavoidable).           ~The Botox injections have achieved well over 50%  improvement in the patient's symptoms. Migraines have been reduced at least 7 days per month and the number of cumulative hours suffering with headaches has been reduced at least 100 total hours per month.  Move forward with botox #4        For HA :  Diclofenac or nurtec     To break up Headaches:  Can consider nerve blocks again        Other:  N/A           All test results as well as any necessary instructions were reviewed and discussed with patient.    Review:                   Patient to return to PCP/other specialists for all other problems  Patient to continue on all medications as Rx'd  Full office note available online   RTO-for botox 4  The patient indicates understanding of these issues and agrees to the plan.    HPI:   Shamika Young is a 43 y.o. 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     At last visit received botox #3, has nurtec   Barometric changes can trigger HA, or during menses  3 migraine days a month  Last HA was 3 weeks ago   Pain in frontalis/vertex and back of the head  No side effects from botox  Hasn't had to use nurtec     At last visit: botox 1 given (wrote that she had some discomfort over eyebrows), and has nurtec (previously) given as prophylaxis. Diclofenac used to abort HA.   Only 1 migraine in past month, eyebrow pain dissipated shortly after writing. "I finally feel so much better!"  Would like refill on nurtec  Location:frontalis and retroorbital and more to the top of the head, occasionally down the cheek (right)  MRI brain previously: essentially  normal, single non specific [facility] found right parietal.     At last visit: sujata, PT " "and nurtec.   Has been on nerivio with some success  24 HD in past month, most migrainous, has been light headed/feeling sea sick   Pain in frontalis/vertex and back of the head  Has HA now  No current GIB when asked   States she has some stuttering/difficulty getting words out when she has a bad migraine   Has had some twitching frontalis   Qulipta causing side effects/constipation   Would like to make medication changes         From previous visit:: stopped emgality, qulipta started instead. PT ordered, lyrica 25 mg BID ordered. Mood meds per other providers. Nurtec available. I also referred to back and spine.   + constipation with qulipta but  ultimately feeling better and doesn't want to make changes .   3HD/30   Getting some menstrual migraine   Hasn't had to take nurtec  Self stopped lyrica   Going to chiropractor and getting massage   She is pending PT   Did not want to make any changes  Back again starts in back of head and radiates to around the eyes       From previous visits::emgality continued, mood medicines per other providers, nurtec for HA  and GONB/TPI given.   3-4 HD a week in past month, historically was a lot less, no trigger idenfitied   Pain starts in back of the head and radiates around to the eyes   Has had some weakness in her right hand has noticed some discomfort in her neck  Has been off emgality for a few months, "I couldn't remember to take it"  Using tramadol daily for past month d/t neck pain  Feels the pain is making her "turn her head to the left"  No dysphagia  No incontinence  No falls  No recent trauma   Has weakness in the arm on right though pain more in LEFT neck/occiput        From previous visit: had emgality, nurtec and zanaflex.  10 HD a month  Had mild-moderate HA now  Pain in occipitalis and trapezius  + photophobia  Zanaflex not helping/stopped taking it  Nurtec does help  Emgality is helping   Chocolate and menses are a trigger   "I really have benefit from " "TPI, can I please have one?"     From previous visits:wrote for emgality and nurtec.   HA weather related (?) and sleep deprivation "I stay up late with my kids watching television"  Emgality helping   Stopped taking BC  Pain along frontalis  nurtec is hit/miss  8 HD in past month  Severity: 4-8/10  HA last: 12-15 hours     HA HPI:  Start:Since HS, took triptans, and seemed to aide, and in 2021 returned  (frequency), started new BC about a month and noticed frequency intensified   History of trauma (no), History of CNS infection (no), History of Stroke (no)  Location:frontalis and retroorbital and more to the top of the head, occasionally down the cheek (right)  Severity: range: 3-6/10  Duration:8-12 hours in a day   Frequency:12-15 HD a month   Associated factors (bolded positive) WITH HA  (or migraine): Nausea, vomiting, photophobia, phonophobia, tinnitus, scalp pain, vision loss, diplopia, scintillations, eye pain, jaw pain, weakness?    Tried:maxalt, no preventative   Triggers (in bold): stress, lack of sleep, too much caffeine(soda and coffee), too little caffeine, weather change, seasonal change, strong odours, bright lights, sunlight, food  Last HA: yesterday  Positives in bold: Hx of Kidney Stones, asthma (as a child), GI bleed, osteoporosis, CAD/MI, CVA/TIA, DM    Imaging on file:3/29/2023 MRI C spine, 12/28/2023 MRI brain   Therapies tried in past: (failures to be marked, if known---why did it fail?)  vyvanse  wellbutrin  maxalt  Prednisone   buspar  zofran  imitrex  depakote  Tizandine  Gabapentin -didn't tolerate  Lyrica  Qulipta  GONB/TPI  Nurtec   Emgality   Tramadol   Buspar  Toradol   Botox  Diclofenac     Medication Reconciliation:   Current Outpatient Medications   Medication Sig Dispense Refill    ALPRAZolam (XANAX) 0.5 MG tablet Take 0.5 mg by mouth daily as needed.      azelastine (ASTELIN) 137 mcg (0.1 %) nasal spray 1 spray (137 mcg total) by Nasal route 2 (two) times daily. 30 mL 0    " berberine/herbal complex no.18 (BERBERINE-HERBAL COMB NO.18 ORAL) Take by mouth once daily.      buPROPion (WELLBUTRIN) 75 MG tablet Take 75 mg by mouth once daily.      bupropion HBr (APLENZIN ORAL)       busPIRone (BUSPAR) 10 MG tablet Take 10 mg by mouth 3 (three) times daily.      cevimeline (EVOXAC) 30 mg capsule Take 1 capsule (30 mg total) by mouth 3 (three) times daily. 90 capsule 11    EPINEPHrine (EPIPEN 2-CA) 0.3 mg/0.3 mL AtIn Inject 0.6 mLs (0.6 mg total) into the muscle once. for 1 dose 0.6 mL 0    ergocalciferol, vitamin D2, (VITAMIN D ORAL) Take by mouth.      hydroxychloroquine (PLAQUENIL) 200 mg tablet Take 1 tablet (200 mg total) by mouth 2 (two) times a day. 60 tablet 11    Lactobac no.41/Bifidobact no.7 (PROBIOTIC-10 ORAL) Take by mouth. bromoline      LYLLANA 0.05 mg/24 hr 1 patch twice a week.      methylphenidate HCl (RITALIN) 20 MG tablet Take 20 mg by mouth once daily.      montelukast (SINGULAIR) 10 mg tablet TAKE 1 TABLET(10 MG) BY MOUTH EVERY EVENING 90 tablet 3    predniSONE (DELTASONE) 5 MG tablet TAKE 1 TO 4 TABLETS BY MOUTH DAILY AS NEEDED(SJOGRENS/CRYOGLBULINEMIA). MAY TAKE FOR FLARES AS NEEDED 75 tablet 3    progesterone (PROMETRIUM) 100 MG capsule Take 1 capsule every day by oral route at bedtime for 30 days.      rimegepant (NURTEC) 75 mg odt Dissolve 1 tablet by mouth every other day for migraine prevention 15 tablet 6    traMADoL (ULTRAM) 50 mg tablet Take 1 tablet (50 mg total) by mouth every 12 (twelve) hours as needed for Pain (joint pains dx Primary Sjogrens with vasculitis.). 40 tablet 3    VYVANSE 30 mg capsule Take 30 mg by mouth nightly.       No current facility-administered medications for this visit.     Review of patient's allergies indicates:   Allergen Reactions    Tree nut Anaphylaxis    Tree nuts Anaphylaxis    Biaxin [clarithromycin] Other (See Comments)     GI symptoms     Erythromycin      Unknown    Azithromycin Rash    Ceclor [cefaclor] Rash     Ciprofloxacin Rash    Penicillins Rash    Tetracyclines Hives       PMHx:  Past Medical History:   Diagnosis Date    Allergy     previously on immunotherapy    Anxiety     Bleeding disorder     Depression     Facial trauma     Food allergy     Headache     Lumbago with sciatica, right side     Lumbar disc disease     L4-L5    Migraine headache     Osteoarthritis     Other chronic pain     Panic disorder     Positive CHELSEA (antinuclear antibody)     Purpura     Seasonal allergies     Sjogren's syndrome     Vasculitis      Past Surgical History:   Procedure Laterality Date    ADENOIDECTOMY  1995    CYST REMOVAL  2012    mandibular    HYSTEROSCOPY N/A 07/17/2020    Procedure: HYSTEROSCOPY/ with IUD removal;  Surgeon: Cherry Christine MD;  Location: [facility];  Service: OB/GYN;  Laterality: N/A;    INTRAUTERINE DEVICE INSERTION  07/17/2020    Procedure: INSERTION, INTRAUTERINE DEVICE;  Surgeon: Cherry Christine MD;  Location: [facility];  Service: OB/GYN;;    lumbar spine procedure      2004/2014    REMOVAL OF INTRAUTERINE DEVICE (IUD)  07/17/2020    Procedure: REMOVAL, INTRAUTERINE DEVICE;  Surgeon: Cherry Christine MD;  Location: [facility];  Service: OB/GYN;;    TONSILLECTOMY  1995    TONSILLECTOMY AND ADENOIDECTOMY      7th grade    turbonate reduction Bilateral 12/06/2023       Fhx:  Family History   Problem Relation Name Age of Onset    Multiple sclerosis Mother Kelley Maynor     Heart disease Mother Kelley Maynor     Diabetes Mother Kelley Maynor     Hypertension Mother Kelley Maynor     Heart failure Mother Kelley Maynor     Migraines Mother Kelley Maynor     Heart disease Father Gene Maynor     Hyperlipidemia Father Gene Maynor     Heart failure Father Gene Maynor     Migraines Sister Phyllis Ramos     Glaucoma Neg Hx      Retinal detachment Neg Hx      Macular degeneration Neg Hx         Shx:   Social History     Socioeconomic History    Marital status:    Tobacco Use    Smoking status: Former     Smokeless tobacco: Never   Substance and Sexual Activity    Alcohol use: Yes     Alcohol/week: 2.0 standard drinks of alcohol     Types: 2 Drinks containing 0.5 oz of alcohol per week     Comment: socially    Drug use: Never    Sexual activity: Yes     Partners: Male     Birth control/protection: I.U.D.     Social Drivers of Health     Financial Resource Strain: Low Risk  (2024)    Overall Financial Resource Strain (CARDIA)     Difficulty of Paying Living Expenses: Not very hard   Food Insecurity: No Food Insecurity (2024)    Hunger Vital Sign     Worried About Running Out of Food in the Last Year: Never true     Ran Out of Food in the Last Year: Never true   Transportation Needs: Unmet Transportation Needs (2024)    PRAPARE - Transportation     Lack of Transportation (Medical): Yes     Lack of Transportation (Non-Medical): Yes   Physical Activity: Insufficiently Active (2024)    Exercise Vital Sign     Days of Exercise per Week: 3 days     Minutes of Exercise per Session: 30 min   Stress: Stress Concern Present (2024)    [employer] [place] [employer] - Occupational Stress Questionnaire     Feeling of Stress : To some extent   Housing Stability: Unknown (2024)    Housing Stability Vital Sign     Number of Places Lived in the Last Year: 1     Unstable Housing in the Last Year: No           Labs:   Reviewed in chart     Imagin2023 MRI brain (report): 1. There is no acute abnormality. There is no hemorrhage, mass/mass effect, acute infarction. There is no pathologic enhancement. A single punctate focus of FLAIR hyperintense signal in the right parietal subcortical white matter is completely nonspecific and too small to further characterize. The brain is essentially normal for age.       3/29/2023 MRI C spine (report): Vertebral column: There is straightening of the cervical spine with loss of normal lordosis.  This is nonspecific but likely positional and/or  degenerative in etiology.  The vertebral bodies maintain normal height.  There is no fracture.  Alignment is grossly normal.  There is moderate disc space narrowing at the C6-7 level with at least mild disc space narrowing at the C5-6 level.  There is endplate osteophyte formation at both of these levels.  There is mixed degenerative endplate signal change mostly anteriorly at the C6-7 level.  There is subtle superior endplate irregularity of C7 which could be related to a reactive Schmorl's node.  Baseline marrow signal is normal.  The discs are desiccated.  The odontoid process is intact.     Spinal canal, cord, epidural space: The spinal canal may be borderline small on a developmental basis.  There is mild flattening of the cord surface at several levels but cord signal is normal without edema or myelomalacia.  There is no abnormal epidural mass or fluid collection.     Findings by level:     C2-3: There is no spinal canal or significant foraminal stenosis.     C3-4: There is mild-to-moderate left foraminal stenosis due to uncovertebral spurring and mild facet joint arthropathy.  There is also mild disc osteophyte complex which narrows the subarachnoid space and results in minimal flattening of the ventral cord surface and mild spinal stenosis.     C4-5: There is moderate left foraminal stenosis due to uncovertebral spurring and facet joint arthropathy.  Again, there is a mild disc osteophyte complex, slightly eccentric to the left contributing to borderline to mild spinal stenosis and crowding of the left lateral recess.     C5-6: There is at least mild disc space narrowing.  There is bilateral uncovertebral spurring.  There is a broad disc protrusion/osteophyte eccentric to the right resulting in very subtle flattening of the right ventral cord surface.  There is mild spinal stenosis.  There is mild-to-moderate bilateral foraminal stenosis.     C6-7: There is moderate disc space narrowing.  There is a broad  disc protrusion eccentric to the left.  There is right greater than left uncovertebral spurring with mild bilateral facet joint arthropathy.  There is mild-to-moderate spinal stenosis with moderate right and mild left foraminal stenosis.     C7-T1: There is bilateral facet joint arthropathy.  There is a minimal disc bulge.  There is no spinal stenosis.  There is only mild bilateral foraminal stenosis.     There are small disc protrusions at both the T1-2 and T2-3 levels.  There is mild spinal stenosis without acute cord compression at the T2-3 level.     Soft tissues, other: The prevertebral soft tissues are grossly normal.  The airway is patent.     Impression:     1. There is multilevel degenerative change discussed in detail by level above.  The findings are present in the setting of a spinal canal which may be borderline small on a developmental basis.  There is some degree of spinal canal and foraminal stenosis at multiple levels.  Also, there is subtle flattening of the cord surface at several levels due to these changes but there is no evidence of cord edema or myelomalacia.  Please see above discussion.            Other testing:  Reviewed in chart     Note: I have independently reviewed any/all imaging/labs/tests and agree with the report (s) as documented.  Any discrepancies will be as noted/demarcated by free text.  AUGIE MG 10/1/2024                     ROS:   Review Of Systems (questions asked, positive or additions in BOLD)  Gen: Weight change, fatigue/malaise, pyrexia   HEENT: Tinnitus, headache,  blurred vision, eye pain, diplopia, photophobia,  nose bleeds, congestion, sore throat, jaw pain, scalp pain, neck stiffness   Card: Palpitations, CP   Pulm: SOB, cough   Vas: Easy bruising, easy bleeding   GI: N/V/D/C, incontinence, hematemesis, hematochezia    : incontinence, hematuria   Endocrine: Temp intolerance, polyuria, polydipsia   M/S: Neck pain, myalgia, back pain, joint pain, falls    Neuro: PER  HPI   PSY: Memory loss, confusion, depression, anxiety, trouble in sleep          EXAM:    Remains consistent   There were no vitals taken for this visit.   <---none taken as this was a virtual visit   GEN: NAD  HEENT: NC/AT      NEURO:  Mental Status:  Awake, alert and appropriately oriented to time, place, and person.  Normal attention and concentration.  Speech is fluent and appropriate language function (I.e., comprehension).     Cranial Nerves:    Extraocular movements are intact and without nystagmus.      Facial movement is symmetric.   Hearing appears intact.  Uvula in midline. DROM of neck in all (6) directions, shoulder shrug symmetrical. Tongue in midline without fasiculation.     Motor:  antitgravity bilat UE         Gait and Stance: not tested but seen standing during much of visit         This document has been electronically signed by Mr. Eduar Serra MPA, PA-C on 10/1/2024, I have personally typed this message using the EMR.       Dr Daniel MD was available during today's visit.       CC: Julia Sargent MD

## 2024-10-07 ENCOUNTER — HOSPITAL ENCOUNTER (OUTPATIENT)
Dept: CARDIOLOGY | Facility: HOSPITAL | Age: 44
Discharge: HOME | End: 2024-10-07
Attending: INTERNAL MEDICINE
Payer: COMMERCIAL

## 2024-10-07 VITALS — HEIGHT: 66 IN | BODY MASS INDEX: 21.86 KG/M2 | WEIGHT: 136 LBS

## 2024-10-07 DIAGNOSIS — R00.2 HEART PALPITATIONS: ICD-10-CM

## 2024-10-07 DIAGNOSIS — M06.4 INFLAMMATORY POLYARTHRITIS: ICD-10-CM

## 2024-10-07 DIAGNOSIS — M35.00 SJOGREN'S SYNDROME WITHOUT EXTRAGLANDULAR INVOLVEMENT: ICD-10-CM

## 2024-10-07 DIAGNOSIS — D89.1 CRYOGLOBULINEMIC VASCULITIS: ICD-10-CM

## 2024-10-07 LAB
ASCENDING AORTA: 2.44 CM
AV INDEX (PROSTH): 0.7
AV MEAN GRADIENT: 4.9 MMHG
AV PEAK GRADIENT: 9 MMHG
AV VALVE AREA BY VELOCITY RATIO: 2.1 CM²
AV VALVE AREA: 2 CM²
AV VELOCITY RATIO: 0.73
BSA FOR ECHO PROCEDURE: 1.69 M2
CV ECHO LV RWT: 0.36 CM
DOP CALC AO PEAK VEL: 1.5 M/S
DOP CALC AO VTI: 27.5 CM
DOP CALC LVOT AREA: 2.8 CM2
DOP CALC LVOT DIAMETER: 1.9 CM
DOP CALC LVOT PEAK VEL: 1.1 M/S
DOP CALC LVOT STROKE VOLUME: 54.4 CM3
DOP CALCLVOT PEAK VEL VTI: 19.2 CM
E WAVE DECELERATION TIME: 165.98 MSEC
E/A RATIO: 1.03
E/E' RATIO: 5.77 M/S
ECHO LV POSTERIOR WALL: 0.8 CM (ref 0.6–1.1)
EJECTION FRACTION: 60 %
FRACTIONAL SHORTENING: 37.8 % (ref 28–44)
INTERVENTRICULAR SEPTUM: 0.8 CM (ref 0.6–1.1)
IVRT: 74.22 MSEC
LEFT ATRIUM AREA SYSTOLIC (APICAL 2 CHAMBER): 13.97 CM2
LEFT ATRIUM AREA SYSTOLIC (APICAL 4 CHAMBER): 16.16 CM2
LEFT ATRIUM SIZE: 2.75 CM
LEFT ATRIUM VOLUME INDEX MOD: 25.8 ML/M2
LEFT ATRIUM VOLUME MOD: 43.94 ML
LEFT INTERNAL DIMENSION IN SYSTOLE: 2.8 CM (ref 2.1–4)
LEFT VENTRICLE DIASTOLIC VOLUME INDEX: 55.08 ML/M2
LEFT VENTRICLE DIASTOLIC VOLUME: 93.63 ML
LEFT VENTRICLE END SYSTOLIC VOLUME APICAL 2 CHAMBER: 37.82 ML
LEFT VENTRICLE END SYSTOLIC VOLUME APICAL 4 CHAMBER: 43.67 ML
LEFT VENTRICLE MASS INDEX: 66.8 G/M2
LEFT VENTRICLE SYSTOLIC VOLUME INDEX: 17 ML/M2
LEFT VENTRICLE SYSTOLIC VOLUME: 28.89 ML
LEFT VENTRICULAR INTERNAL DIMENSION IN DIASTOLE: 4.5 CM (ref 3.5–6)
LEFT VENTRICULAR MASS: 113.6 G
LV LATERAL E/E' RATIO: 5 M/S
LV SEPTAL E/E' RATIO: 6.82 M/S
LVED V (TEICH): 93.63 ML
LVES V (TEICH): 28.89 ML
LVOT MG: 2.47 MMHG
LVOT MV: 0.74 CM/S
MV PEAK A VEL: 0.73 M/S
MV PEAK E VEL: 0.75 M/S
MV STENOSIS PRESSURE HALF TIME: 48.13 MS
MV VALVE AREA P 1/2 METHOD: 4.57 CM2
PISA MRMAX VEL: 5.25 M/S
PISA TR MAX VEL: 2.09 M/S
PULM VEIN S/D RATIO: 1.23
PV PEAK D VEL: 0.43 M/S
PV PEAK S VEL: 0.53 M/S
RA PRESSURE ESTIMATED: 3 MMHG
RA VOL SYS: 15.79 ML
RIGHT ATRIAL AREA: 8.4 CM2
RIGHT ATRIUM VOLUME AREA LENGTH APICAL 4 CHAMBER: 15.32 ML
RIGHT VENTRICLE DIASTOLIC LENGTH: 5.7 CM
RIGHT VENTRICLE DIASTOLIC MID DIMENSION: 1.6 CM
RIGHT VENTRICULAR END-DIASTOLIC DIMENSION: 2.63 CM
RIGHT VENTRICULAR LENGTH IN DIASTOLE (APICAL 4-CHAMBER VIEW): 5.7 CM
RV MID DIAMA: 1.64 CM
RV TB RVSP: 5 MMHG
RV TISSUE DOPPLER FREE WALL SYSTOLIC VELOCITY 1 (APICAL 4 CHAMBER VIEW): 16.26 CM/S
SINUS: 2.54 CM
STJ: 2.45 CM
TDI LATERAL: 0.15 M/S
TDI SEPTAL: 0.11 M/S
TDI: 0.13 M/S
TR MAX PG: 17 MMHG
TRICUSPID ANNULAR PLANE SYSTOLIC EXCURSION: 2.14 CM
TV REST PULMONARY ARTERY PRESSURE: 20 MMHG
Z-SCORE OF LEFT VENTRICULAR DIMENSION IN END DIASTOLE: -0.49
Z-SCORE OF LEFT VENTRICULAR DIMENSION IN END SYSTOLE: -0.35

## 2024-10-07 PROCEDURE — 93306 TTE W/DOPPLER COMPLETE: CPT | Mod: PO

## 2024-10-07 PROCEDURE — 93306 TTE W/DOPPLER COMPLETE: CPT | Mod: 26,,, | Performed by: INTERNAL MEDICINE

## 2024-10-08 ENCOUNTER — PATIENT MESSAGE (OUTPATIENT)
Dept: FAMILY MEDICINE | Facility: CLINIC | Age: 44
End: 2024-10-08
Payer: COMMERCIAL

## 2024-10-08 DIAGNOSIS — Q24.8 INTERATRIAL CARDIAC SHUNT: Primary | ICD-10-CM

## 2024-10-09 ENCOUNTER — PATIENT MESSAGE (OUTPATIENT)
Dept: RHEUMATOLOGY | Facility: CLINIC | Age: 44
End: 2024-10-09
Payer: COMMERCIAL

## 2024-10-09 DIAGNOSIS — Z12.31 OTHER SCREENING MAMMOGRAM: ICD-10-CM

## 2024-10-09 RX ORDER — TRAMADOL HYDROCHLORIDE 50 MG/1
50 TABLET ORAL EVERY 12 HOURS PRN
Qty: 40 TABLET | Refills: 3 | Status: SHIPPED | OUTPATIENT
Start: 2024-10-09

## 2024-10-10 ENCOUNTER — LAB VISIT (OUTPATIENT)
Dept: LAB | Facility: HOSPITAL | Age: 44
End: 2024-10-10
Attending: INTERNAL MEDICINE
Payer: COMMERCIAL

## 2024-10-10 ENCOUNTER — PATIENT OUTREACH (OUTPATIENT)
Dept: RHEUMATOLOGY | Facility: CLINIC | Age: 44
End: 2024-10-10
Payer: COMMERCIAL

## 2024-10-10 ENCOUNTER — PATIENT MESSAGE (OUTPATIENT)
Dept: RHEUMATOLOGY | Facility: CLINIC | Age: 44
End: 2024-10-10

## 2024-10-10 DIAGNOSIS — D89.1 CRYOGLOBULINEMIC VASCULITIS: Primary | ICD-10-CM

## 2024-10-10 DIAGNOSIS — D69.2 PURPURA: ICD-10-CM

## 2024-10-10 DIAGNOSIS — D89.1 CRYOGLOBULINEMIC VASCULITIS: ICD-10-CM

## 2024-10-10 LAB
BASOPHILS # BLD AUTO: 0.02 K/UL (ref 0–0.2)
BASOPHILS NFR BLD: 0.2 % (ref 0–1.9)
CRP SERPL-MCNC: 1.4 MG/L (ref 0–8.2)
DIFFERENTIAL METHOD BLD: ABNORMAL
EOSINOPHIL # BLD AUTO: 0 K/UL (ref 0–0.5)
EOSINOPHIL NFR BLD: 0 % (ref 0–8)
ERYTHROCYTE [DISTWIDTH] IN BLOOD BY AUTOMATED COUNT: 13.6 % (ref 11.5–14.5)
ERYTHROCYTE [SEDIMENTATION RATE] IN BLOOD BY PHOTOMETRIC METHOD: 22 MM/HR (ref 0–36)
HCT VFR BLD AUTO: 37.4 % (ref 37–48.5)
HGB BLD-MCNC: 12 G/DL (ref 12–16)
IMM GRANULOCYTES # BLD AUTO: 0.05 K/UL (ref 0–0.04)
IMM GRANULOCYTES NFR BLD AUTO: 0.5 % (ref 0–0.5)
LYMPHOCYTES # BLD AUTO: 0.8 K/UL (ref 1–4.8)
LYMPHOCYTES NFR BLD: 7.6 % (ref 18–48)
MCH RBC QN AUTO: 29.7 PG (ref 27–31)
MCHC RBC AUTO-ENTMCNC: 32.1 G/DL (ref 32–36)
MCV RBC AUTO: 93 FL (ref 82–98)
MONOCYTES # BLD AUTO: 0.3 K/UL (ref 0.3–1)
MONOCYTES NFR BLD: 3.1 % (ref 4–15)
NEUTROPHILS # BLD AUTO: 9.2 K/UL (ref 1.8–7.7)
NEUTROPHILS NFR BLD: 88.6 % (ref 38–73)
NRBC BLD-RTO: 0 /100 WBC
PLATELET # BLD AUTO: 310 K/UL (ref 150–450)
PMV BLD AUTO: 10.8 FL (ref 9.2–12.9)
RBC # BLD AUTO: 4.04 M/UL (ref 4–5.4)
WBC # BLD AUTO: 10.34 K/UL (ref 3.9–12.7)

## 2024-10-10 PROCEDURE — 85025 COMPLETE CBC W/AUTO DIFF WBC: CPT | Performed by: INTERNAL MEDICINE

## 2024-10-10 PROCEDURE — 85652 RBC SED RATE AUTOMATED: CPT | Performed by: INTERNAL MEDICINE

## 2024-10-10 PROCEDURE — 86140 C-REACTIVE PROTEIN: CPT | Performed by: INTERNAL MEDICINE

## 2024-10-10 PROCEDURE — 99358 PROLONG SERVICE W/O CONTACT: CPT | Mod: S$GLB,,, | Performed by: INTERNAL MEDICINE

## 2024-10-10 PROCEDURE — 82595 ASSAY OF CRYOGLOBULIN: CPT | Performed by: INTERNAL MEDICINE

## 2024-10-10 NOTE — Clinical Note
Please set up CBC, sed, CRP, cryoglobulin [facility] only, have patient come as soon as able given her email with photos.

## 2024-10-10 NOTE — PROGRESS NOTES
Received message that patient has upcoming Truxima infusion and needs orders re-entered and signed    Re-entered orders. Routing to provider to review and sign. Infusion appointment is on Monday 10/14/24    Annual Hep B, Hep C, and TB are up to date and last completed 3/15/2024

## 2024-10-10 NOTE — PROGRESS NOTES
Labs now  No infrared yoga.   Patient with a new problem requesting medical advice and management.   Photos submitted.   Patient request via telephone or e-mail regarding complaint or new issue and requesting a new/change in treatment plan as related to his/her diagnosis/es currently managed with Rheumatology.   Request/ Treatment Planning warranted review of labs/imaging and charts as well as other providers records in formulation of treatment plan. Comorbidities, concurrent medications from other providers, and previous reactions were taken into account as medical decision making.   Discussion with patient about plan of care or change in plan of care did occur via Telephone/email via patient portal.  All future labs/imaging to monitor condition and changes in plan were entered and scheduled for patient.       Total time spent:   35

## 2024-10-11 RX ORDER — METHYLPREDNISOLONE SOD SUCC 125 MG
100 VIAL (EA) INJECTION
OUTPATIENT
Start: 2024-10-11

## 2024-10-11 RX ORDER — ACETAMINOPHEN 325 MG/1
650 TABLET ORAL
OUTPATIENT
Start: 2024-10-11

## 2024-10-11 RX ORDER — FAMOTIDINE 10 MG/ML
20 INJECTION INTRAVENOUS
OUTPATIENT
Start: 2024-10-11

## 2024-10-14 ENCOUNTER — INFUSION (OUTPATIENT)
Dept: INFUSION THERAPY | Facility: HOSPITAL | Age: 44
End: 2024-10-14
Attending: INTERNAL MEDICINE
Payer: COMMERCIAL

## 2024-10-14 VITALS
TEMPERATURE: 98 F | RESPIRATION RATE: 18 BRPM | OXYGEN SATURATION: 99 % | BODY MASS INDEX: 22 KG/M2 | WEIGHT: 136.88 LBS | HEIGHT: 66 IN | HEART RATE: 120 BPM | SYSTOLIC BLOOD PRESSURE: 142 MMHG | DIASTOLIC BLOOD PRESSURE: 90 MMHG

## 2024-10-14 DIAGNOSIS — D89.1 CRYOGLOBULINEMIC VASCULITIS: Primary | ICD-10-CM

## 2024-10-14 DIAGNOSIS — M35.00 SJOGREN'S SYNDROME WITHOUT EXTRAGLANDULAR INVOLVEMENT: ICD-10-CM

## 2024-10-14 DIAGNOSIS — I73.00 RAYNAUD'S PHENOMENON WITHOUT GANGRENE: ICD-10-CM

## 2024-10-14 DIAGNOSIS — I77.6 SMALL VESSEL VASCULITIS: ICD-10-CM

## 2024-10-14 LAB
ALBUMIN SERPL BCP-MCNC: 3.7 G/DL (ref 3.5–5.2)
ALP SERPL-CCNC: 61 U/L (ref 55–135)
ALT SERPL W/O P-5'-P-CCNC: 48 U/L (ref 10–44)
ANION GAP SERPL CALC-SCNC: 8 MMOL/L (ref 8–16)
AST SERPL-CCNC: 30 U/L (ref 10–40)
BILIRUB SERPL-MCNC: 0.4 MG/DL (ref 0.1–1)
BUN SERPL-MCNC: 16 MG/DL (ref 6–20)
CALCIUM SERPL-MCNC: 9 MG/DL (ref 8.7–10.5)
CHLORIDE SERPL-SCNC: 107 MMOL/L (ref 95–110)
CO2 SERPL-SCNC: 23 MMOL/L (ref 23–29)
CREAT SERPL-MCNC: 0.8 MG/DL (ref 0.5–1.4)
CRP SERPL-MCNC: 1.2 MG/L (ref 0–8.2)
EST. GFR  (NO RACE VARIABLE): >60 ML/MIN/1.73 M^2
GLUCOSE SERPL-MCNC: 94 MG/DL (ref 70–110)
POTASSIUM SERPL-SCNC: 4.1 MMOL/L (ref 3.5–5.1)
PROT SERPL-MCNC: 7.5 G/DL (ref 6–8.4)
SODIUM SERPL-SCNC: 138 MMOL/L (ref 136–145)

## 2024-10-14 PROCEDURE — 96375 TX/PRO/DX INJ NEW DRUG ADDON: CPT | Mod: PN

## 2024-10-14 PROCEDURE — 96415 CHEMO IV INFUSION ADDL HR: CPT | Mod: PN

## 2024-10-14 PROCEDURE — 25000003 PHARM REV CODE 250: Mod: PN | Performed by: INTERNAL MEDICINE

## 2024-10-14 PROCEDURE — 86140 C-REACTIVE PROTEIN: CPT | Performed by: INTERNAL MEDICINE

## 2024-10-14 PROCEDURE — 63600175 PHARM REV CODE 636 W HCPCS: Mod: JZ,JG,PN | Performed by: INTERNAL MEDICINE

## 2024-10-14 PROCEDURE — 80053 COMPREHEN METABOLIC PANEL: CPT | Mod: PN | Performed by: INTERNAL MEDICINE

## 2024-10-14 PROCEDURE — 96413 CHEMO IV INFUSION 1 HR: CPT | Mod: PN

## 2024-10-14 PROCEDURE — 85652 RBC SED RATE AUTOMATED: CPT | Performed by: INTERNAL MEDICINE

## 2024-10-14 PROCEDURE — 96367 TX/PROPH/DG ADDL SEQ IV INF: CPT | Mod: PN

## 2024-10-14 RX ORDER — METHYLPREDNISOLONE SOD SUCC 125 MG
100 VIAL (EA) INJECTION
OUTPATIENT
Start: 2024-10-24

## 2024-10-14 RX ORDER — METHYLPREDNISOLONE SOD SUCC 125 MG
80 VIAL (EA) INJECTION ONCE
Status: DISCONTINUED | OUTPATIENT
Start: 2024-10-14 | End: 2024-10-14

## 2024-10-14 RX ORDER — MEPERIDINE HYDROCHLORIDE 50 MG/ML
25 INJECTION INTRAMUSCULAR; INTRAVENOUS; SUBCUTANEOUS
OUTPATIENT
Start: 2024-10-24 | End: 2024-10-25

## 2024-10-14 RX ORDER — METHYLPREDNISOLONE SOD SUCC 125 MG
80 VIAL (EA) INJECTION ONCE
OUTPATIENT
Start: 2024-10-24

## 2024-10-14 RX ORDER — FAMOTIDINE 10 MG/ML
20 INJECTION INTRAVENOUS
Status: COMPLETED | OUTPATIENT
Start: 2024-10-14 | End: 2024-10-14

## 2024-10-14 RX ORDER — METHYLPREDNISOLONE SOD SUCC 125 MG
100 VIAL (EA) INJECTION
Status: COMPLETED | OUTPATIENT
Start: 2024-10-14 | End: 2024-10-14

## 2024-10-14 RX ORDER — HEPARIN 100 UNIT/ML
500 SYRINGE INTRAVENOUS
OUTPATIENT
Start: 2024-10-24

## 2024-10-14 RX ORDER — DIPHENHYDRAMINE HYDROCHLORIDE 50 MG/ML
50 INJECTION INTRAMUSCULAR; INTRAVENOUS ONCE AS NEEDED
OUTPATIENT
Start: 2024-10-24

## 2024-10-14 RX ORDER — SODIUM CHLORIDE 0.9 % (FLUSH) 0.9 %
10 SYRINGE (ML) INJECTION
OUTPATIENT
Start: 2024-10-24

## 2024-10-14 RX ORDER — EPINEPHRINE 0.3 MG/.3ML
0.3 INJECTION SUBCUTANEOUS ONCE AS NEEDED
OUTPATIENT
Start: 2024-10-24

## 2024-10-14 RX ORDER — ACETAMINOPHEN 325 MG/1
650 TABLET ORAL
Status: COMPLETED | OUTPATIENT
Start: 2024-10-14 | End: 2024-10-14

## 2024-10-14 RX ORDER — FAMOTIDINE 10 MG/ML
20 INJECTION INTRAVENOUS
OUTPATIENT
Start: 2024-10-24

## 2024-10-14 RX ORDER — ACETAMINOPHEN 325 MG/1
650 TABLET ORAL
OUTPATIENT
Start: 2024-10-24

## 2024-10-14 RX ADMIN — RITUXIMAB-ABBS 1000 MG: 10 INJECTION, SOLUTION INTRAVENOUS at 10:10

## 2024-10-14 RX ADMIN — ACETAMINOPHEN 650 MG: 325 TABLET ORAL at 09:10

## 2024-10-14 RX ADMIN — METHYLPREDNISOLONE SODIUM SUCCINATE 100 MG: 125 INJECTION, POWDER, FOR SOLUTION INTRAMUSCULAR; INTRAVENOUS at 10:10

## 2024-10-14 RX ADMIN — DIPHENHYDRAMINE HYDROCHLORIDE 25 MG: 50 INJECTION, SOLUTION INTRAMUSCULAR; INTRAVENOUS at 10:10

## 2024-10-14 RX ADMIN — FAMOTIDINE 20 MG: 10 INJECTION INTRAVENOUS at 09:10

## 2024-10-14 NOTE — PLAN OF CARE
Tolerated Truxima well.  No reactions noted.  No questions or concerns at this time.  Ambulated off unit in NAD.

## 2024-10-15 LAB — ERYTHROCYTE [SEDIMENTATION RATE] IN BLOOD BY PHOTOMETRIC METHOD: 29 MM/HR (ref 0–36)

## 2024-10-18 LAB — CRYOGLOB SER QL: PRESENT

## 2024-10-21 ENCOUNTER — PATIENT MESSAGE (OUTPATIENT)
Dept: OPTOMETRY | Facility: CLINIC | Age: 44
End: 2024-10-21

## 2024-10-21 ENCOUNTER — OFFICE VISIT (OUTPATIENT)
Dept: OPTOMETRY | Facility: CLINIC | Age: 44
End: 2024-10-21
Payer: COMMERCIAL

## 2024-10-21 DIAGNOSIS — Z79.899 LONG-TERM USE OF PLAQUENIL: ICD-10-CM

## 2024-10-21 DIAGNOSIS — H52.203 MYOPIA OF BOTH EYES WITH ASTIGMATISM AND PRESBYOPIA: ICD-10-CM

## 2024-10-21 DIAGNOSIS — H52.4 MYOPIA OF BOTH EYES WITH ASTIGMATISM AND PRESBYOPIA: ICD-10-CM

## 2024-10-21 DIAGNOSIS — H52.13 MYOPIA OF BOTH EYES WITH ASTIGMATISM AND PRESBYOPIA: ICD-10-CM

## 2024-10-21 DIAGNOSIS — M06.4 INFLAMMATORY POLYARTHRITIS: Primary | ICD-10-CM

## 2024-10-21 PROCEDURE — 92014 COMPRE OPH EXAM EST PT 1/>: CPT | Mod: ,,, | Performed by: OPTOMETRIST

## 2024-10-21 PROCEDURE — 92310 CONTACT LENS FITTING OU: CPT | Mod: CSM,,, | Performed by: OPTOMETRIST

## 2024-10-21 PROCEDURE — 99999 PR PBB SHADOW E&M-EST. PATIENT-LVL III: CPT | Mod: PBBFAC,,, | Performed by: OPTOMETRIST

## 2024-10-21 PROCEDURE — 92015 DETERMINE REFRACTIVE STATE: CPT | Mod: ,,, | Performed by: OPTOMETRIST

## 2024-10-21 NOTE — PROGRESS NOTES
HPI    Pt here for annual with CL exam dls- 02/02/21    Pt sts about 2 months ago started noticing blurry vision, pt stopped   taking her plaquenil then until she could be sure it wasn't affecting   vision.   Pt wears CL and specs, needs updated RX for both.   Occaional floaters.   Using OTC Refresh PRN with relief.   Last edited by Marilyn Ferraro on 10/21/2024  1:42 PM.            Assessment /Plan     For exam results, see Encounter Report.    Inflammatory polyarthritis    Long-term use of Plaquenil    Myopia of both eyes with astigmatism and presbyopia      1,2. No plaquenil maculopathy, RTC for HVF(10-2) and oct macula, call with results  3. New Spectacle Rx given and Contact lens Rx released to pt. Daily wear only advised, with education to risks of extended wear.  Discussed lens care, compliance and solutions. RTC yearly contact lens follow up. , discussed different options for glasses. RTC 1 year routine eye exam.

## 2024-10-24 ENCOUNTER — PATIENT MESSAGE (OUTPATIENT)
Dept: RHEUMATOLOGY | Facility: CLINIC | Age: 44
End: 2024-10-24
Payer: COMMERCIAL

## 2024-10-24 DIAGNOSIS — D84.9 IMMUNOSUPPRESSION: ICD-10-CM

## 2024-10-24 DIAGNOSIS — D89.1 CRYOGLOBULINEMIC VASCULITIS: Primary | ICD-10-CM

## 2024-10-24 DIAGNOSIS — M35.00 SJOGREN'S SYNDROME WITHOUT EXTRAGLANDULAR INVOLVEMENT: ICD-10-CM

## 2024-10-25 ENCOUNTER — TELEPHONE (OUTPATIENT)
Dept: CARDIOLOGY | Facility: CLINIC | Age: 44
End: 2024-10-25
Payer: COMMERCIAL

## 2024-10-25 ENCOUNTER — CLINICAL SUPPORT (OUTPATIENT)
Dept: OPHTHALMOLOGY | Facility: CLINIC | Age: 44
End: 2024-10-25
Payer: COMMERCIAL

## 2024-10-25 ENCOUNTER — HOSPITAL ENCOUNTER (OUTPATIENT)
Dept: RADIOLOGY | Facility: HOSPITAL | Age: 44
Discharge: HOME | End: 2024-10-25
Attending: INTERNAL MEDICINE
Payer: COMMERCIAL

## 2024-10-25 DIAGNOSIS — Z79.899 LONG-TERM USE OF PLAQUENIL: ICD-10-CM

## 2024-10-25 DIAGNOSIS — M06.4 INFLAMMATORY POLYARTHRITIS: ICD-10-CM

## 2024-10-25 DIAGNOSIS — Z12.31 OTHER SCREENING MAMMOGRAM: ICD-10-CM

## 2024-10-25 PROCEDURE — 77067 SCR MAMMO BI INCL CAD: CPT | Mod: 26,,, | Performed by: RADIOLOGY

## 2024-10-25 PROCEDURE — 77067 SCR MAMMO BI INCL CAD: CPT | Mod: TC,PN

## 2024-10-25 PROCEDURE — 77063 BREAST TOMOSYNTHESIS BI: CPT | Mod: 26,,, | Performed by: RADIOLOGY

## 2024-10-25 PROCEDURE — 77063 BREAST TOMOSYNTHESIS BI: CPT | Mod: TC,PN

## 2024-10-25 NOTE — TELEPHONE ENCOUNTER
----- Message from Taryn sent at 10/25/2024  8:43 AM CDT -----   Type:  Needs Medical Advice    Who Called: PT  Would the patient rather a call back or a response via [facility]? Call  Best Call Back Number:  [phone]        Additional Information:  Pt states she wants to go get any test done she need to take before 11/8/24 appt.  Also prefer you to send message in Pt portal. Please call back to advise. Thank you!

## 2024-10-25 NOTE — PROGRESS NOTES
PT PRESENTS TODAY FOR HVF 10-2.  PT HELD FIXATION WELL.      Assessment /Plan     For exam results, see Encounter Report.    Inflammatory polyarthritis  -     OCT- Retina  -     Floyd Visual Field - OU - Extended - Both Eyes    Long-term use of Plaquenil  -     OCT- Retina  -     Floyd Visual Field - OU - Extended - Both Eyes

## 2024-10-28 ENCOUNTER — INFUSION (OUTPATIENT)
Dept: INFUSION THERAPY | Facility: HOSPITAL | Age: 44
End: 2024-10-28
Attending: INTERNAL MEDICINE
Payer: COMMERCIAL

## 2024-10-28 VITALS
HEART RATE: 103 BPM | SYSTOLIC BLOOD PRESSURE: 132 MMHG | OXYGEN SATURATION: 99 % | RESPIRATION RATE: 16 BRPM | TEMPERATURE: 98 F | DIASTOLIC BLOOD PRESSURE: 83 MMHG | HEIGHT: 66 IN | WEIGHT: 147.25 LBS | BODY MASS INDEX: 23.66 KG/M2

## 2024-10-28 DIAGNOSIS — D89.1 CRYOGLOBULINEMIC VASCULITIS: Primary | ICD-10-CM

## 2024-10-28 PROCEDURE — 96367 TX/PROPH/DG ADDL SEQ IV INF: CPT | Mod: PN

## 2024-10-28 PROCEDURE — 96375 TX/PRO/DX INJ NEW DRUG ADDON: CPT | Mod: PN

## 2024-10-28 PROCEDURE — 96413 CHEMO IV INFUSION 1 HR: CPT | Mod: PN

## 2024-10-28 PROCEDURE — 96415 CHEMO IV INFUSION ADDL HR: CPT | Mod: PN

## 2024-10-28 PROCEDURE — 25000003 PHARM REV CODE 250: Mod: PN | Performed by: INTERNAL MEDICINE

## 2024-10-28 PROCEDURE — 63600175 PHARM REV CODE 636 W HCPCS: Mod: PN | Performed by: INTERNAL MEDICINE

## 2024-10-28 RX ORDER — FAMOTIDINE 10 MG/ML
20 INJECTION INTRAVENOUS
Status: CANCELLED | OUTPATIENT
Start: 2024-10-28

## 2024-10-28 RX ORDER — ACETAMINOPHEN 325 MG/1
650 TABLET ORAL
Status: COMPLETED | OUTPATIENT
Start: 2024-10-28 | End: 2024-10-28

## 2024-10-28 RX ORDER — HEPARIN 100 UNIT/ML
500 SYRINGE INTRAVENOUS
OUTPATIENT
Start: 2024-10-28

## 2024-10-28 RX ORDER — DIPHENHYDRAMINE HYDROCHLORIDE 50 MG/ML
50 INJECTION INTRAMUSCULAR; INTRAVENOUS ONCE AS NEEDED
OUTPATIENT
Start: 2024-10-28

## 2024-10-28 RX ORDER — SODIUM CHLORIDE 0.9 % (FLUSH) 0.9 %
10 SYRINGE (ML) INJECTION
Status: DISCONTINUED | OUTPATIENT
Start: 2024-10-28 | End: 2024-10-28 | Stop reason: HOSPADM

## 2024-10-28 RX ORDER — MEPERIDINE HYDROCHLORIDE 25 MG/ML
25 INJECTION INTRAMUSCULAR; INTRAVENOUS; SUBCUTANEOUS
Status: DISCONTINUED | OUTPATIENT
Start: 2024-10-28 | End: 2024-10-28 | Stop reason: HOSPADM

## 2024-10-28 RX ORDER — METHYLPREDNISOLONE SOD SUCC 125 MG
80 VIAL (EA) INJECTION ONCE
Status: DISCONTINUED | OUTPATIENT
Start: 2024-10-28 | End: 2024-10-28 | Stop reason: HOSPADM

## 2024-10-28 RX ORDER — METHYLPREDNISOLONE SOD SUCC 125 MG
100 VIAL (EA) INJECTION
Status: CANCELLED | OUTPATIENT
Start: 2024-10-28

## 2024-10-28 RX ORDER — MEPERIDINE HYDROCHLORIDE 50 MG/ML
25 INJECTION INTRAMUSCULAR; INTRAVENOUS; SUBCUTANEOUS
Status: CANCELLED | OUTPATIENT
Start: 2024-10-28 | End: 2024-10-29

## 2024-10-28 RX ORDER — METHYLPREDNISOLONE SOD SUCC 125 MG
80 VIAL (EA) INJECTION ONCE
OUTPATIENT
Start: 2024-10-28

## 2024-10-28 RX ORDER — EPINEPHRINE 0.3 MG/.3ML
0.3 INJECTION SUBCUTANEOUS ONCE AS NEEDED
OUTPATIENT
Start: 2024-10-28

## 2024-10-28 RX ORDER — METHYLPREDNISOLONE SOD SUCC 125 MG
100 VIAL (EA) INJECTION
Status: COMPLETED | OUTPATIENT
Start: 2024-10-28 | End: 2024-10-28

## 2024-10-28 RX ORDER — FAMOTIDINE 10 MG/ML
20 INJECTION INTRAVENOUS
Status: COMPLETED | OUTPATIENT
Start: 2024-10-28 | End: 2024-10-28

## 2024-10-28 RX ORDER — SODIUM CHLORIDE 0.9 % (FLUSH) 0.9 %
10 SYRINGE (ML) INJECTION
OUTPATIENT
Start: 2024-10-28

## 2024-10-28 RX ORDER — HEPARIN 100 UNIT/ML
500 SYRINGE INTRAVENOUS
Status: DISCONTINUED | OUTPATIENT
Start: 2024-10-28 | End: 2024-10-28 | Stop reason: HOSPADM

## 2024-10-28 RX ORDER — ACETAMINOPHEN 325 MG/1
650 TABLET ORAL
Status: CANCELLED | OUTPATIENT
Start: 2024-10-28

## 2024-10-28 RX ORDER — EPINEPHRINE 0.3 MG/.3ML
0.3 INJECTION SUBCUTANEOUS ONCE AS NEEDED
Status: DISCONTINUED | OUTPATIENT
Start: 2024-10-28 | End: 2024-10-28 | Stop reason: HOSPADM

## 2024-10-28 RX ORDER — DIPHENHYDRAMINE HYDROCHLORIDE 50 MG/ML
50 INJECTION INTRAMUSCULAR; INTRAVENOUS ONCE AS NEEDED
Status: DISCONTINUED | OUTPATIENT
Start: 2024-10-28 | End: 2024-10-28 | Stop reason: HOSPADM

## 2024-10-28 RX ADMIN — FAMOTIDINE 20 MG: 10 INJECTION INTRAVENOUS at 10:10

## 2024-10-28 RX ADMIN — ACETAMINOPHEN 650 MG: 325 TABLET ORAL at 10:10

## 2024-10-28 RX ADMIN — METHYLPREDNISOLONE SODIUM SUCCINATE 100 MG: 125 INJECTION, POWDER, FOR SOLUTION INTRAMUSCULAR; INTRAVENOUS at 10:10

## 2024-10-28 RX ADMIN — SODIUM CHLORIDE: 9 INJECTION, SOLUTION INTRAVENOUS at 10:10

## 2024-10-28 RX ADMIN — RITUXIMAB-ABBS 1000 MG: 10 INJECTION, SOLUTION INTRAVENOUS at 10:10

## 2024-10-28 RX ADMIN — DIPHENHYDRAMINE HYDROCHLORIDE 25 MG: 50 INJECTION, SOLUTION INTRAMUSCULAR; INTRAVENOUS at 10:10

## 2024-10-30 ENCOUNTER — PATIENT MESSAGE (OUTPATIENT)
Dept: OPTOMETRY | Facility: CLINIC | Age: 44
End: 2024-10-30
Payer: COMMERCIAL

## 2024-11-05 ENCOUNTER — PROCEDURE VISIT (OUTPATIENT)
Dept: NEUROLOGY | Facility: CLINIC | Age: 44
End: 2024-11-05
Payer: COMMERCIAL

## 2024-11-05 VITALS
RESPIRATION RATE: 17 BRPM | HEIGHT: 66 IN | SYSTOLIC BLOOD PRESSURE: 120 MMHG | WEIGHT: 147.25 LBS | BODY MASS INDEX: 23.66 KG/M2 | HEART RATE: 98 BPM | DIASTOLIC BLOOD PRESSURE: 64 MMHG | TEMPERATURE: 97 F

## 2024-11-05 DIAGNOSIS — G43.709 CHRONIC MIGRAINE WITHOUT AURA WITHOUT STATUS MIGRAINOSUS, NOT INTRACTABLE: Primary | ICD-10-CM

## 2024-11-05 PROCEDURE — 64615 CHEMODENERV MUSC MIGRAINE: CPT | Mod: S$GLB,,, | Performed by: PHYSICIAN ASSISTANT

## 2024-11-05 NOTE — PROCEDURES
[facility]  [address] [place] [address]  Phone:[phone]  Fax: [phone]  Botox Visit, #4    Chief Complaint   Patient presents with    Botulinum Toxin Injection         A/P:        ICD-10-CM ICD-9-CM   1. Chronic migraine without aura without status migrainosus, not intractable  G43.709 346.70     Botox for migraine    Historically: Patient meets the criteria for chronic migraine. In summary, She has headaches/migraines >15 days per month  and last >4 hours if untreated. Specifics of duration, frequency and strength are listed in office visit HPI's.  This pattern has continued for >3 months.  She has failed at least three preventive medications (full list of medications is listed in office notes of Therapies tried in past)  I have therefore recommended a trial of Botox via the PREEMPT protocol for migraine prophylaxis.We schedule regular follow up intervals to check on status.     PROCEDURE NOTE:  BOTOX injection is indicated for the prophylaxis of headaches in adult patients with chronic  migraine. Patient meets indications for BOTOX therapy.  Potential risks and benefits were reviewed. Side effects including, but not limited to, potential  systemic allergic reactions of the anaphylactic type as well as local injection site reactions of  blepharoptosis, diplopia, infection, bleeding, pain, redness and bruising were reviewed. The  potential for headaches and/or neck pain post procedure were reviewed.  The patient's questions were answered. The patient signed a consent form. Patient  understands that depending on their insurance carrier, there may be a copay for this treatment.  BOTOX was reconstituted using  two 100 unit vials and diluted with 4 mL of sterile saline.  BOTOX was injected as per the PREEMPT trial injection paradigm with dose administered as 5  unit intramuscular (IM) injections per site using a sterile, 30-gauge 0.5 inch needle as  follows:  Muscle Dose, # of Sites   10 units divided in 2 sites  Procerus 5 units in 1 site  Frontalis 20 units divided in 4 sites  Temporalis 40 units divided in 8 sites  Occipitalis 30 units divided in 6 sites  Cervical paraspinal 20 units divided in 4 sites  Trapezius 30 units divided in 6 sites  Each site was cleaned with alcohol prior to injection. A total dose of 155 units were injected. 45  units were discarded/wasted.      The patient tolerated the procedure well with no immediate complications.  MEDICATION INFO:  NDC 7125-8117-52   Lot # [id number]  Exp 11/2026    As aside: The Botox injections have achieved well over 50%  improvement in the patient's symptoms. Migraines have been reduced at least 7 days per month and the number of cumulative hours suffering with headaches has been reduced at least 100 total hours per month.  Frequency of treatment is every 3 months unless no response to the treatments, at which time we will discontinue the injections.           Follow up in 3 months for repeat injection, we also have interspersed office visits scheduled to ensure efficacy of botox sessions/check on patient.       Eduar Serra MPA, PA-C  Attending available-Dr Daniel MD           Personal Protective Equipment:    Personal Protective Equipment was used during this encounter including;  non latex gloves.     11/05/2024      CC: Julia Sargent MD

## 2024-11-06 ENCOUNTER — TELEPHONE (OUTPATIENT)
Dept: CARDIOLOGY | Facility: CLINIC | Age: 44
End: 2024-11-06
Payer: COMMERCIAL

## 2024-11-08 ENCOUNTER — TELEPHONE (OUTPATIENT)
Dept: FAMILY MEDICINE | Facility: CLINIC | Age: 44
End: 2024-11-08

## 2024-11-08 ENCOUNTER — OFFICE VISIT (OUTPATIENT)
Dept: FAMILY MEDICINE | Facility: CLINIC | Age: 44
End: 2024-11-08
Payer: COMMERCIAL

## 2024-11-08 VITALS
BODY MASS INDEX: 23.1 KG/M2 | WEIGHT: 143.75 LBS | HEIGHT: 66 IN | SYSTOLIC BLOOD PRESSURE: 118 MMHG | OXYGEN SATURATION: 98 % | HEART RATE: 93 BPM | DIASTOLIC BLOOD PRESSURE: 68 MMHG

## 2024-11-08 DIAGNOSIS — R22.31 SUBCUTANEOUS MASS OF RIGHT UPPER EXTREMITY: Primary | ICD-10-CM

## 2024-11-08 PROCEDURE — 99999 PR PBB SHADOW E&M-EST. PATIENT-LVL V: CPT | Mod: PBBFAC,,, | Performed by: NURSE PRACTITIONER

## 2024-11-08 NOTE — TELEPHONE ENCOUNTER
Called pt got her us scheduled for next Friday.   Lab Results   Component Value Date    HGBA1C 7 2 (H) 04/11/2022     · Presents to ED with complaints of left leg pain  T 100 7  Erythema to left calf  Ulcer to left lateral foot  · xrays left foot  · Received clindamycin in ED  Continue with vanco for now  · Spoke with Podiatry  NO plan for OR today  May need bedside I&D   Pending arterial doppler

## 2024-11-08 NOTE — PROGRESS NOTES
THIS DOCUMENT WAS MADE IN PART WITH VOICE RECOGNITION SOFTWARE.  OCCASIONALLY THIS SOFTWARE WILL MISINTERPRET WORDS OR PHRASES.     Assessment and Plan:    Subcutaneous mass of right upper extremity  -     US Soft Tissue Upper Extremity, Right; Future; Expected date: 11/08/2024             Visit summary:    Differential diagnosis includes Lipoma, subcutaneous cyst, fat necrosis.      Reassurance given.     We will obtain ultrasound.      Will refer to surgeon for excision if needed.     Follow up if symptoms worsen or fail to improve.   ______________________________________________________________________  Subjective:    Chief Complaint:  Chief Complaint   Patient presents with    Mass     Lump on the right arm near the bicep notice since sept.        HPI:  Shamika is a 44 y.o. year old female here concerned regarding dime-sized nontender firm subcutaneous cystic lesion to anterior aspect of right upper arm. 1st noticed in Sept. Patient reports that her massage therapist noticed it advised her to come get it checked out.  Denies known trauma- has been working out recently with some weights.  She reports history of subcutaneous cysts.  Thought it may be that or a lipoma.  Patient has not noticed change in size or shape.      Medications:  Current Outpatient Medications on File Prior to Visit   Medication Sig Dispense Refill    ALPRAZolam (XANAX) 0.5 MG tablet Take 0.5 mg by mouth daily as needed.      azelastine (ASTELIN) 137 mcg (0.1 %) nasal spray 1 spray (137 mcg total) by Nasal route 2 (two) times daily. 30 mL 0    buPROPion (WELLBUTRIN) 75 MG tablet Take 75 mg by mouth once daily.      bupropion HBr (APLENZIN ORAL)       busPIRone (BUSPAR) 10 MG tablet Take 10 mg by mouth 3 (three) times daily.      cevimeline (EVOXAC) 30 mg capsule Take 1 capsule (30 mg total) by mouth 3 (three) times daily. 90 capsule 11    EPINEPHrine (EPIPEN 2-CA) 0.3 mg/0.3 mL AtIn Inject 0.6 mLs (0.6 mg total) into the muscle once. for 1  dose 0.6 mL 0    ergocalciferol, vitamin D2, (VITAMIN D ORAL) Take by mouth.      hydroxychloroquine (PLAQUENIL) 200 mg tablet Take 1 tablet (200 mg total) by mouth 2 (two) times a day. 60 tablet 11    LYLLANA 0.05 mg/24 hr 1 patch twice a week.      methylphenidate HCl (RITALIN) 20 MG tablet Take 20 mg by mouth once daily.      montelukast (SINGULAIR) 10 mg tablet TAKE 1 TABLET(10 MG) BY MOUTH EVERY EVENING 90 tablet 3    predniSONE (DELTASONE) 5 MG tablet TAKE 1 TO 4 TABLETS BY MOUTH DAILY AS NEEDED(SJOGRENS/CRYOGLBULINEMIA). MAY TAKE FOR FLARES AS NEEDED 75 tablet 3    progesterone (PROMETRIUM) 100 MG capsule Take 1 capsule every day by oral route at bedtime for 30 days.      rimegepant (NURTEC) 75 mg odt Dissolve 1 tablet by mouth every other day for migraine prevention 15 tablet 6    traMADoL (ULTRAM) 50 mg tablet Take 1 tablet (50 mg total) by mouth every 12 (twelve) hours as needed for Pain (joint pains dx Primary Sjogrens with vasculitis.). 40 tablet 3    VYVANSE 30 mg capsule Take 30 mg by mouth nightly.      [DISCONTINUED] berberine/herbal complex no.18 (BERBERINE-HERBAL COMB NO.18 ORAL) Take by mouth once daily.      [DISCONTINUED] Lactobac no.41/Bifidobact no.7 (PROBIOTIC-10 ORAL) Take by mouth. bromoline       No current facility-administered medications on file prior to visit.       Review of Systems:  Review of Systems   Constitutional:  Negative for fatigue and unexpected weight change.   HENT:  Negative for congestion, postnasal drip and rhinorrhea.    Respiratory:  Negative for cough and shortness of breath.    Gastrointestinal:  Negative for constipation, diarrhea, nausea and vomiting.   Genitourinary:  Negative for difficulty urinating and dysuria.   Musculoskeletal:  Negative for arthralgias, back pain, joint swelling and myalgias.   Skin:  Negative for color change and rash.        Subcutaneous mass right upper arm   Neurological:  Negative for dizziness and headaches.       Past Medical  "History:  Past Medical History:   Diagnosis Date    Allergy     previously on immunotherapy    Anxiety     Bleeding disorder     Depression     Facial trauma     Food allergy     Headache     Lumbago with sciatica, right side     Lumbar disc disease     L4-L5    Migraine headache     Osteoarthritis     Other chronic pain     Panic disorder     Positive CHELSEA (antinuclear antibody)     Purpura     Seasonal allergies     Sjogren's syndrome     Vasculitis        Objective:    Vitals:  Vitals:    11/08/24 1105   BP: 118/68   Pulse: 93   SpO2: 98%   Weight: 65.2 kg (143 lb 11.8 oz)   Height: 5' 6" (1.676 m)       Physical Exam  Constitutional:       General: She is not in acute distress.  HENT:      Head: Normocephalic and atraumatic.   Eyes:      General: No scleral icterus.     Conjunctiva/sclera: Conjunctivae normal.   Cardiovascular:      Rate and Rhythm: Normal rate and regular rhythm.   Pulmonary:      Effort: Pulmonary effort is normal. No respiratory distress.      Breath sounds: Normal breath sounds.   Musculoskeletal:      Right lower leg: No edema.      Left lower leg: No edema.   Skin:     General: Skin is warm and dry.             Comments: dime-sized nontender firm subcutaneous mass palpated to anterior aspect of right upper arm.  Skin is intact without erythema.    Neurological:      Mental Status: She is alert and oriented to person, place, and time.   Psychiatric:         Mood and Affect: Mood normal.         Behavior: Behavior normal.         Thought Content: Thought content normal.         Data:  .  Lab Results   Component Value Date    WBC 10.34 10/10/2024    HGB 12.0 10/10/2024    HCT 37.4 10/10/2024    MCV 93 10/10/2024     10/10/2024      CMP  Sodium   Date Value Ref Range Status   10/14/2024 138 136 - 145 mmol/L Final     Potassium   Date Value Ref Range Status   10/14/2024 4.1 3.5 - 5.1 mmol/L Final     Chloride   Date Value Ref Range Status   10/14/2024 107 95 - 110 mmol/L Final     CO2 "   Date Value Ref Range Status   10/14/2024 23 23 - 29 mmol/L Final     Glucose   Date Value Ref Range Status   10/14/2024 94 70 - 110 mg/dL Final     BUN   Date Value Ref Range Status   10/14/2024 16 6 - 20 mg/dL Final     Creatinine   Date Value Ref Range Status   10/14/2024 0.8 0.5 - 1.4 mg/dL Final     Calcium   Date Value Ref Range Status   10/14/2024 9.0 8.7 - 10.5 mg/dL Final     Total Protein   Date Value Ref Range Status   10/14/2024 7.5 6.0 - 8.4 g/dL Final     Albumin   Date Value Ref Range Status   10/14/2024 3.7 3.5 - 5.2 g/dL Final     Total Bilirubin   Date Value Ref Range Status   10/14/2024 0.4 0.1 - 1.0 mg/dL Final     Comment:     For infants and newborns, interpretation of results should be based  on gestational age, weight and in agreement with clinical  observations.    Premature Infant recommended reference ranges:  Up to 24 hours.............<8.0 mg/dL  Up to 48 hours............<12.0 mg/dL  3-5 days..................<15.0 mg/dL  6-29 days.................<15.0 mg/dL       Alkaline Phosphatase   Date Value Ref Range Status   10/14/2024 61 55 - 135 U/L Final     AST   Date Value Ref Range Status   10/14/2024 30 10 - 40 U/L Final     ALT   Date Value Ref Range Status   10/14/2024 48 (H) 10 - 44 U/L Final     Anion Gap   Date Value Ref Range Status   10/14/2024 8 8 - 16 mmol/L Final     eGFR   Date Value Ref Range Status   10/14/2024 >60.0 >60 mL/min/1.73 m^2 Final        Medical history reviewed, Medications reconciled.          Rukhsana Preston, FNP-C  Family Medicine

## 2024-11-15 ENCOUNTER — HOSPITAL ENCOUNTER (OUTPATIENT)
Dept: RADIOLOGY | Facility: HOSPITAL | Age: 44
Discharge: HOME | End: 2024-11-15
Attending: NURSE PRACTITIONER
Payer: COMMERCIAL

## 2024-11-15 ENCOUNTER — PATIENT MESSAGE (OUTPATIENT)
Dept: NEUROLOGY | Facility: CLINIC | Age: 44
End: 2024-11-15
Payer: COMMERCIAL

## 2024-11-15 DIAGNOSIS — G43.709 CHRONIC MIGRAINE WITHOUT AURA WITHOUT STATUS MIGRAINOSUS, NOT INTRACTABLE: ICD-10-CM

## 2024-11-15 DIAGNOSIS — R22.31 SUBCUTANEOUS MASS OF RIGHT UPPER EXTREMITY: ICD-10-CM

## 2024-11-15 PROCEDURE — 76882 US LMTD JT/FCL EVL NVASC XTR: CPT | Mod: TC,PO,RT

## 2024-11-15 PROCEDURE — 76882 US LMTD JT/FCL EVL NVASC XTR: CPT | Mod: 26,RT,, | Performed by: RADIOLOGY

## 2024-11-15 RX ORDER — RIMEGEPANT SULFATE 75 MG/75MG
TABLET, ORALLY DISINTEGRATING ORAL
Qty: 16 TABLET | Refills: 11 | Status: SHIPPED | OUTPATIENT
Start: 2024-11-15

## 2024-11-17 ENCOUNTER — PATIENT MESSAGE (OUTPATIENT)
Dept: OPTOMETRY | Facility: CLINIC | Age: 44
End: 2024-11-17
Payer: COMMERCIAL

## 2024-11-18 ENCOUNTER — PATIENT MESSAGE (OUTPATIENT)
Dept: FAMILY MEDICINE | Facility: CLINIC | Age: 44
End: 2024-11-18
Payer: COMMERCIAL

## 2024-11-18 NOTE — PROGRESS NOTES
Ultrasound findings consistent with a lipoma.  This is just a collection of fat cells that grow slowly and usually does not cause any problems.  Lipomas do not usually require treatment unless they are causing discomfort.  This is something we will keep an eye on.  If it grows larger or becomes painful, you might consider having it removed.  Let me know if you have any questions or concerns.

## 2024-12-16 DIAGNOSIS — D89.1 CRYOGLOBULINEMIC VASCULITIS: ICD-10-CM

## 2024-12-16 DIAGNOSIS — M06.4 INFLAMMATORY POLYARTHRITIS: ICD-10-CM

## 2024-12-16 DIAGNOSIS — M35.00 SJOGREN'S SYNDROME WITHOUT EXTRAGLANDULAR INVOLVEMENT: ICD-10-CM

## 2024-12-17 RX ORDER — TRAMADOL HYDROCHLORIDE 50 MG/1
50 TABLET ORAL EVERY 12 HOURS PRN
Qty: 40 TABLET | Refills: 3 | Status: SHIPPED | OUTPATIENT
Start: 2024-12-17

## 2024-12-17 RX ORDER — PREDNISONE 5 MG/1
5 TABLET ORAL DAILY PRN
Qty: 75 TABLET | Refills: 3 | Status: SHIPPED | OUTPATIENT
Start: 2024-12-17 | End: 2024-12-18 | Stop reason: SDUPTHER

## 2024-12-18 RX ORDER — PREDNISONE 5 MG/1
5-20 TABLET ORAL DAILY PRN
Qty: 75 TABLET | Refills: 3 | Status: SHIPPED | OUTPATIENT
Start: 2024-12-18 | End: 2025-12-18

## 2024-12-30 ENCOUNTER — HOSPITAL ENCOUNTER (OUTPATIENT)
Dept: RADIOLOGY | Facility: HOSPITAL | Age: 44
Discharge: HOME | End: 2024-12-30
Payer: COMMERCIAL

## 2024-12-30 DIAGNOSIS — I20.9 ANGINA PECTORIS, UNSPECIFIED: ICD-10-CM

## 2024-12-30 PROCEDURE — 75571 CT HRT W/O DYE W/CA TEST: CPT | Mod: TC,PO

## 2024-12-30 PROCEDURE — 75571 CT HRT W/O DYE W/CA TEST: CPT | Mod: 26,,, | Performed by: RADIOLOGY

## 2025-01-17 ENCOUNTER — PATIENT MESSAGE (OUTPATIENT)
Dept: RHEUMATOLOGY | Facility: CLINIC | Age: 45
End: 2025-01-17
Payer: COMMERCIAL

## 2025-01-17 ENCOUNTER — PATIENT MESSAGE (OUTPATIENT)
Dept: NEUROLOGY | Facility: CLINIC | Age: 45
End: 2025-01-17
Payer: COMMERCIAL

## 2025-01-17 ENCOUNTER — TELEPHONE (OUTPATIENT)
Dept: NEUROLOGY | Facility: CLINIC | Age: 45
End: 2025-01-17
Payer: COMMERCIAL

## 2025-01-17 NOTE — TELEPHONE ENCOUNTER
Called patient to reschedule her Botox appointment with Eduar Serra 1/21 at 9:15 due to inclement weather expected to come in.  She agreed and wanted another Monday appointment, she will now come 1/27 at 1:30.  This appointment was a 2 day exchange so I added her to the appointment calendar under wait list so that it can be overridden by a nurse.

## 2025-01-24 ENCOUNTER — PATIENT MESSAGE (OUTPATIENT)
Dept: RHEUMATOLOGY | Facility: CLINIC | Age: 45
End: 2025-01-24
Payer: COMMERCIAL

## 2025-01-24 ENCOUNTER — TELEPHONE (OUTPATIENT)
Dept: NEUROLOGY | Facility: CLINIC | Age: 45
End: 2025-01-24
Payer: COMMERCIAL

## 2025-01-25 ENCOUNTER — PATIENT MESSAGE (OUTPATIENT)
Dept: NEUROLOGY | Facility: CLINIC | Age: 45
End: 2025-01-25
Payer: COMMERCIAL

## 2025-02-01 DIAGNOSIS — M35.00 SJOGREN'S SYNDROME WITHOUT EXTRAGLANDULAR INVOLVEMENT: ICD-10-CM

## 2025-02-01 DIAGNOSIS — L50.9 URTICARIA: ICD-10-CM

## 2025-02-01 DIAGNOSIS — D89.1 CRYOGLOBULINEMIC VASCULITIS: ICD-10-CM

## 2025-02-01 DIAGNOSIS — I73.00 RAYNAUD'S PHENOMENON WITHOUT GANGRENE: ICD-10-CM

## 2025-02-03 DIAGNOSIS — M35.00 SJOGREN'S SYNDROME WITHOUT EXTRAGLANDULAR INVOLVEMENT: ICD-10-CM

## 2025-02-03 DIAGNOSIS — L50.9 URTICARIA: ICD-10-CM

## 2025-02-03 DIAGNOSIS — D89.1 CRYOGLOBULINEMIC VASCULITIS: ICD-10-CM

## 2025-02-03 DIAGNOSIS — I73.00 RAYNAUD'S PHENOMENON WITHOUT GANGRENE: ICD-10-CM

## 2025-02-04 RX ORDER — CEVIMELINE HYDROCHLORIDE 30 MG/1
30 CAPSULE ORAL 3 TIMES DAILY
Qty: 90 CAPSULE | Refills: 0 | Status: SHIPPED | OUTPATIENT
Start: 2025-02-04

## 2025-02-04 RX ORDER — CEVIMELINE HYDROCHLORIDE 30 MG/1
30 CAPSULE ORAL 3 TIMES DAILY
Qty: 90 CAPSULE | Refills: 11 | Status: SHIPPED | OUTPATIENT
Start: 2025-02-04

## 2025-02-04 RX ORDER — PREDNISONE 5 MG/1
5-20 TABLET ORAL DAILY PRN
Qty: 75 TABLET | Refills: 3 | Status: SHIPPED | OUTPATIENT
Start: 2025-02-04 | End: 2026-02-04

## 2025-02-04 RX ORDER — HYDROXYCHLOROQUINE SULFATE 200 MG/1
200 TABLET, FILM COATED ORAL 2 TIMES DAILY
Qty: 60 TABLET | Refills: 11 | Status: SHIPPED | OUTPATIENT
Start: 2025-02-04

## 2025-03-10 ENCOUNTER — LAB VISIT (OUTPATIENT)
Dept: LAB | Facility: HOSPITAL | Age: 45
End: 2025-03-10
Attending: INTERNAL MEDICINE
Payer: COMMERCIAL

## 2025-03-10 DIAGNOSIS — D84.9 IMMUNOSUPPRESSION: ICD-10-CM

## 2025-03-10 DIAGNOSIS — M35.00 SJOGREN'S SYNDROME WITHOUT EXTRAGLANDULAR INVOLVEMENT: ICD-10-CM

## 2025-03-10 DIAGNOSIS — D89.1 CRYOGLOBULINEMIC VASCULITIS: ICD-10-CM

## 2025-03-10 LAB
ALT SERPL W/O P-5'-P-CCNC: 14 U/L (ref 10–44)
AST SERPL-CCNC: 18 U/L (ref 10–40)
CREAT SERPL-MCNC: 0.8 MG/DL (ref 0.5–1.4)
CRP SERPL-MCNC: 1 MG/L (ref 0–8.2)
ERYTHROCYTE [SEDIMENTATION RATE] IN BLOOD BY PHOTOMETRIC METHOD: 5 MM/HR (ref 0–36)
EST. GFR  (NO RACE VARIABLE): >60 ML/MIN/1.73 M^2

## 2025-03-10 PROCEDURE — 85025 COMPLETE CBC W/AUTO DIFF WBC: CPT | Performed by: INTERNAL MEDICINE

## 2025-03-10 PROCEDURE — 82565 ASSAY OF CREATININE: CPT | Performed by: INTERNAL MEDICINE

## 2025-03-10 PROCEDURE — 86140 C-REACTIVE PROTEIN: CPT | Performed by: INTERNAL MEDICINE

## 2025-03-10 PROCEDURE — 84460 ALANINE AMINO (ALT) (SGPT): CPT | Performed by: INTERNAL MEDICINE

## 2025-03-10 PROCEDURE — 85652 RBC SED RATE AUTOMATED: CPT | Performed by: INTERNAL MEDICINE

## 2025-03-10 PROCEDURE — 82595 ASSAY OF CRYOGLOBULIN: CPT | Performed by: INTERNAL MEDICINE

## 2025-03-10 PROCEDURE — 84450 TRANSFERASE (AST) (SGOT): CPT | Performed by: INTERNAL MEDICINE

## 2025-03-11 LAB
BASOPHILS # BLD AUTO: 0.05 K/UL (ref 0–0.2)
BASOPHILS NFR BLD: 1.1 % (ref 0–1.9)
DIFFERENTIAL METHOD BLD: ABNORMAL
EOSINOPHIL # BLD AUTO: 0.2 K/UL (ref 0–0.5)
EOSINOPHIL NFR BLD: 3.3 % (ref 0–8)
ERYTHROCYTE [DISTWIDTH] IN BLOOD BY AUTOMATED COUNT: 13.9 % (ref 11.5–14.5)
HCT VFR BLD AUTO: 40.8 % (ref 37–48.5)
HGB BLD-MCNC: 13 G/DL (ref 12–16)
IMM GRANULOCYTES # BLD AUTO: 0.01 K/UL (ref 0–0.04)
IMM GRANULOCYTES NFR BLD AUTO: 0.2 % (ref 0–0.5)
LYMPHOCYTES # BLD AUTO: 1.1 K/UL (ref 1–4.8)
LYMPHOCYTES NFR BLD: 23.5 % (ref 18–48)
MCH RBC QN AUTO: 30.2 PG (ref 27–31)
MCHC RBC AUTO-ENTMCNC: 31.9 G/DL (ref 32–36)
MCV RBC AUTO: 95 FL (ref 82–98)
MONOCYTES # BLD AUTO: 0.4 K/UL (ref 0.3–1)
MONOCYTES NFR BLD: 8.3 % (ref 4–15)
NEUTROPHILS # BLD AUTO: 2.9 K/UL (ref 1.8–7.7)
NEUTROPHILS NFR BLD: 63.6 % (ref 38–73)
NRBC BLD-RTO: 0 /100 WBC
PLATELET # BLD AUTO: 300 K/UL (ref 150–450)
PMV BLD AUTO: 11.9 FL (ref 9.2–12.9)
RBC # BLD AUTO: 4.31 M/UL (ref 4–5.4)
WBC # BLD AUTO: 4.6 K/UL (ref 3.9–12.7)

## 2025-03-17 ENCOUNTER — OFFICE VISIT (OUTPATIENT)
Dept: RHEUMATOLOGY | Facility: CLINIC | Age: 45
End: 2025-03-17
Payer: COMMERCIAL

## 2025-03-17 ENCOUNTER — PATIENT MESSAGE (OUTPATIENT)
Dept: RHEUMATOLOGY | Facility: CLINIC | Age: 45
End: 2025-03-17

## 2025-03-17 DIAGNOSIS — L50.9 URTICARIA: ICD-10-CM

## 2025-03-17 DIAGNOSIS — D89.1 CRYOGLOBULINEMIC VASCULITIS: ICD-10-CM

## 2025-03-17 DIAGNOSIS — I73.00 RAYNAUD'S PHENOMENON WITHOUT GANGRENE: ICD-10-CM

## 2025-03-17 DIAGNOSIS — M06.4 INFLAMMATORY POLYARTHRITIS: ICD-10-CM

## 2025-03-17 DIAGNOSIS — M35.00 SJOGREN'S SYNDROME WITHOUT EXTRAGLANDULAR INVOLVEMENT: ICD-10-CM

## 2025-03-17 PROCEDURE — 98006 SYNCH AUDIO-VIDEO EST MOD 30: CPT | Mod: 95,,, | Performed by: INTERNAL MEDICINE

## 2025-03-17 RX ORDER — TRAMADOL HYDROCHLORIDE 50 MG/1
50 TABLET ORAL EVERY 12 HOURS PRN
Qty: 40 TABLET | Refills: 3 | Status: SHIPPED | OUTPATIENT
Start: 2025-03-17

## 2025-03-17 RX ORDER — PREDNISONE 5 MG/1
5-20 TABLET ORAL DAILY PRN
Qty: 75 TABLET | Refills: 3 | Status: SHIPPED | OUTPATIENT
Start: 2025-03-17 | End: 2026-03-17

## 2025-03-17 RX ORDER — HYDROXYCHLOROQUINE SULFATE 200 MG/1
200 TABLET, FILM COATED ORAL 2 TIMES DAILY
Qty: 60 TABLET | Refills: 11 | Status: SHIPPED | OUTPATIENT
Start: 2025-03-17

## 2025-03-17 NOTE — PROGRESS NOTES
Subjective:          Chief Complaint: Shamika Young is a 44 y.o. female who had no chief complaint listed for this encounter.    HPI: Primary Sjogren's w/ cryoglobulinemic vasculitis.  (++RF, +SSA, +SSB, +CHELSEA)     Interval events:      3/2025: patient continues to be very fatigued, we have placed some accommodations that have been followed with new [employer] employee changes she is concerned how this will continue.       9/2024: patient doing well with remote work accommodation, temperature restrictions if daily temp drops below 50 degrees she does not have to go in.   She is overall doing well since restart RTX, few purpura but far less, some hives. She still develops some as she stands for longer. No CP, no SOB.   She did have COVID recently, but recovering.       5/2024: With a history of cryoglobulinemic vasculitis significant purpuriathat started this winter 2024 and progressed such that we restarted her rituximab receiving her 1st infusion 04/15/2024 and her 2nd on 04/29/2024. More recently,  she is having some brusing about the heal and dorsum fo the foot with swelling that is painful x few days.  almost like stone bruises that were occurring after walking that she was curious if this was related. she is here today to discuss this. No hx of similar pain, no pitting, +pain.     Labs from 04/29/2024 do show a sed rate of 47 consistent with her active vasculitis.  She has no anemia at this time in her C-reactive protein was within normal limits.  Liver and kidney are within normal limits.    3/15/2024 she did have cryoglobulin qualitative showed absent did not trigger the quantitative portion of the test.  Patient has included various images which I have reviewed.  1/2024: doing rather well considering winter weather. Some mottlling with red spots at times, no petechia that persist. No open sores. Renal function WNL.   Cryos 11/2023 were trace. Needs HCQ eye exam. Prefers evoxac to salagen. Working with [facility]  "clinic avoiding triptans       9/2023: Patient doing "ok" having fatigue. Noting urticaria and petechia none that opened. Seems to flare with stress and improved now lasted about 5 days.  Legs only.  Last RTX was 3/2023.   We elected to hold on RTX   But did start her on MTX- she started MTX at 10mg x 2 weeks with malaise, nausea for about 3 days felt she would never tolerate this.   She is noting dryness of her eyes and mouth: evoxac still help. Salagen triggered.          6/2023:   Doing better with warm weather.   Fatigue continues w/ stress. She flares with travel. Flares with viral infections.   Continues: with mottleing but no petechial rash.   No numbness or tingling.    She notes some rashes with dusky changes at the knee exacerbates with stress.     Patient has been doing well overall since last RTX (Ruxience) 8/2022.   Patient started in mid Jan with urticarial rashes, livedo and petechia in bilateral LE. We elected to repeat Ruxience (RTX)- Last Ruxience (RTX) 3/13/2023  Pins in needles in forearm but this appears to be positional. Trying to adjust sleep and desk  + Fatigue as well.   Raynauds always worse in winter.   No SOB/LÓPEZ no hemoptysis.     Continues on    Rituxan periodically based on Cryos, symptoms and lab markers.  Last infusion   HCQ 200mg BID- last eye 2021. Dr. Glover no maculopathy will need to call.   Prednisone 5mg- only uses in pulse tapers (20-30mg few days)  Component      Latest Ref Rng & Units 5/8/2023 2/8/2023 2/8/2023 4/25/2022           12:06 PM 12:06 PM    Anti Sm Antibody      0.00 - 0.99 Ratio  0.12     Anti-Sm Interpretation      Negative  Negative     Anti-SSA Antibody      0.00 - 0.99 Ratio  3.94 (H) 3.94 (H)    Anti-SSA Interpretation      Negative  Positive (A) Positive (A)    Anti-SSB Antibody      0.00 - 0.99 Ratio  3.14 (H) 3.14 (H)    Anti-SSB Interpretation      Negative  Positive (A) Positive (A)    ds DNA Ab      Negative 1:10  Negative 1:10     Anti Sm/RNP " Antibody      0.00 - 0.99 Ratio  0.22     Anti-Sm/RNP Interpretation      Negative  Negative     Rheumatoid Factor      0.0 - 15.0 IU/mL       Cryoglobulin, Qual      Absent   Absent Absent   CHELSEA Screen      Negative <1:80   Positive (A)    CHELSEA PATTERN 1         Speckled    CHELSEA Titer 1         1:640    Sed Rate      0 - 36 mm/Hr 5      CRP      0.0 - 8.2 mg/L 1.0        Component      Latest Ref Rng & Units 3/3/2020             Anti Sm Antibody      0.00 - 0.99 Ratio    Anti-Sm Interpretation      Negative    Anti-SSA Antibody      0.00 - 0.99 Ratio 4.67 (H)   Anti-SSA Interpretation      Negative Positive (A)   Anti-SSB Antibody      0.00 - 0.99 Ratio 2.40 (H)   Anti-SSB Interpretation      Negative Positive (A)   ds DNA Ab      Negative 1:10    Anti Sm/RNP Antibody      0.00 - 0.99 Ratio    Anti-Sm/RNP Interpretation      Negative    Rheumatoid Factor      0.0 - 15.0 IU/mL 484.0 (H)   Cryoglobulin, Qual      Absent    CHELSEA Screen      Negative <1:80    CHELSEA PATTERN 1          CHELSEA Titer 1          Sed Rate      0 - 36 mm/Hr    CRP      0.0 - 8.2 mg/L        Rheum Hx:   Patient is a 39-year-old female being referred by Dr. Chopra with hx of cryoglobulinemia vasculitis and Primary Sjogren's.    Patient seen at [facility]. Ultimately diagnosed [id number].  She states rash started during pregnancy of legs started with purpura of legs. She then developed more diffuse coalescing purpura but no open sores. No renal or respiratory disease to date.   Patient did have skin bx: LCV  Previously hypocomplementemic.   She did note fewer skin purpuric lesions after 2 cycles of RTX   She has noted more urticaria with wheal in the skin,  some reactions with food, wheat/rice seem to be worst. She notes no new soaps or detergents. Present since 2017.   She has seen allergist with some nut and environmental allergies no changes present since childhood. - working with DR. Curtis.   Dry eyes present but tolerates contacts.   Dry  mouth more problematic.   Joints fluctuate between 6-9/10. Hand and wrist predominant.   No real Raynauds in triphasic pattern but cold hands and feet.   Patient c/o swelling of lips at times-no known food allergies.       She is noting more livedo and mottling in legs w/o activity, ++ fatigue increasing. She does note during flares some joint pains. 2-5 days every 2 weeks. Flares seem to be ocurring every 2 weeks.   She notes working from home has helped with overall fatigue/pain.   No numbness or tingling.     PFTs at [facility] were reportedly normal 7/2019 , no hemoptysis or SOB  Thinks she had CT chest.   No bone marrow bx.   No DVT, miscarriages, seizures or strokes.   No renal disease.        She is on hydroxychloroquine 200 mg twice daily.  Rituxan last dose 7/2019  cevimiline 30mg TID  Prednisone 10mg once daily PRN ( during flare daily rest is periodical.   Rituximab last dose 7/2019 (x 3 treatments 6 months apart. )     OTC:   Xylimelts.   Not using biotene.   sugarfree lemon  gylcerin lozenges.     Previous medications: Salagen with ASE with [facility].   No hx of hepatitis, no malignancy in self to date.   Component      Latest Ref Rng & Units 3/3/2020   Anti-SSA Antibody      0.00 - 0.99 Ratio 4.67 (H)   Anti-SSA Interpretation      Negative Positive (A)   Anti-SSB Antibody      0.00 - 0.99 Ratio 2.40 (H)   Anti-SSB Interpretation      Negative Positive (A)   Anti Sm Antibody      0.00 - 0.99 Ratio 0.07   Anti-Sm Interpretation      Negative Negative   Anti Sm/RNP Antibody      0.00 - 0.99 Ratio 0.17   Anti-Sm/RNP Interpretation      Negative Negative   Rheumatoid Factor      0.0 - 15.0 IU/mL 484.0 (H)   CCP Antibodies      <5.0 U/mL <0.5   CHELSEA Screen      Negative <1:80 Positive (A)   ds DNA Ab      Negative 1:10 Negative 1:10   CHELSEA HEP-2 Titer       Positive >=1:2560 Speckled         REVIEW OF SYSTEMS:    Review of Systems   Constitutional:  Negative for fever, malaise/fatigue and weight loss.   HENT:  Negative  for sore throat.    Eyes:  Positive for pain and redness. Negative for double vision and photophobia.   Respiratory:  Negative for cough, shortness of breath and wheezing.    Cardiovascular:  Negative for chest pain, palpitations and orthopnea.   Gastrointestinal:  Negative for abdominal pain, constipation and diarrhea.   Genitourinary:  Negative for dysuria, hematuria and urgency.   Musculoskeletal:  Positive for joint pain. Negative for back pain and myalgias.   Skin:  Positive for rash (cryos with petechia one episode).   Neurological:  Positive for headaches. Negative for dizziness, tingling and focal weakness.   Endo/Heme/Allergies:  Does not bruise/bleed easily.   Psychiatric/Behavioral:  Negative for depression, hallucinations and suicidal ideas.                Objective:            Past Medical History:   Diagnosis Date    Allergy     previously on immunotherapy    Anxiety     Bleeding disorder     Depression     Facial trauma     Food allergy     Headache     Lumbago with sciatica, right side     Lumbar disc disease     L4-L5    Migraine headache     Osteoarthritis     Other chronic pain     Panic disorder     Positive CHELSEA (antinuclear antibody)     Purpura     Seasonal allergies     Sjogren's syndrome     Vasculitis      Family History   Problem Relation Name Age of Onset    Multiple sclerosis Mother Kelley Maynor     Heart disease Mother Kelley Maynor     Diabetes Mother Kelley Maynor     Hypertension Mother Kelley Maynor     Heart failure Mother Kelley Maynor     Migraines Mother Kelley Maynor     Heart disease Father Gene Maynor     Hyperlipidemia Father Gene Maynor     Heart failure Father Gene Maynor     Migraines Sister Phyllis Ramos     Glaucoma Neg Hx      Retinal detachment Neg Hx      Macular degeneration Neg Hx       Social History     Tobacco Use    Smoking status: Former     Passive exposure: Current    Smokeless tobacco: Never   Substance Use Topics    Alcohol use: Yes      Alcohol/week: 2.0 standard drinks of alcohol     Types: 2 Drinks containing 0.5 oz of alcohol per week     Comment: socially    Drug use: Never         Current Outpatient Medications on File Prior to Visit   Medication Sig Dispense Refill    ALPRAZolam (XANAX) 0.5 MG tablet Take 0.5 mg by mouth daily as needed.      azelastine (ASTELIN) 137 mcg (0.1 %) nasal spray 1 spray (137 mcg total) by Nasal route 2 (two) times daily. 30 mL 0    buPROPion (WELLBUTRIN) 75 MG tablet Take 75 mg by mouth once daily.      bupropion HBr (APLENZIN ORAL)       busPIRone (BUSPAR) 10 MG tablet Take 10 mg by mouth 3 (three) times daily.      cevimeline (EVOXAC) 30 mg capsule TAKE 1 CAPSULE BY MOUTH THREE TIMES DAILY 90 capsule 0    cevimeline (EVOXAC) 30 mg capsule Take 1 capsule (30 mg total) by mouth 3 (three) times daily. 90 capsule 11    EPINEPHrine (EPIPEN 2-CA) 0.3 mg/0.3 mL AtIn Inject 0.6 mLs (0.6 mg total) into the muscle once. for 1 dose 0.6 mL 0    ergocalciferol, vitamin D2, (VITAMIN D ORAL) Take by mouth.      hydroxychloroquine (PLAQUENIL) 200 mg tablet Take 1 tablet (200 mg total) by mouth 2 (two) times a day. 60 tablet 11    LYLLANA 0.05 mg/24 hr 1 patch twice a week.      methylphenidate HCl (RITALIN) 20 MG tablet Take 20 mg by mouth once daily.      montelukast (SINGULAIR) 10 mg tablet TAKE 1 TABLET(10 MG) BY MOUTH EVERY EVENING 90 tablet 3    predniSONE (DELTASONE) 5 MG tablet Take 1-4 tablets (5-20 mg total) by mouth daily as needed (pain). 75 tablet 3    progesterone (PROMETRIUM) 100 MG capsule Take 1 capsule every day by oral route at bedtime for 30 days.      rimegepant (NURTEC) 75 mg odt Dissolve 1 tablet by mouth every other day for migraine prevention 16 tablet 11    traMADoL (ULTRAM) 50 mg tablet Take 1 tablet (50 mg total) by mouth every 12 (twelve) hours as needed for Pain (joint pains dx Primary Sjogrens with vasculitis.). 40 tablet 3    VYVANSE 30 mg capsule Take 30 mg by mouth nightly.       No current  facility-administered medications on file prior to visit.       There were no vitals filed for this visit.        Physical Exam:    Physical Exam  Constitutional:       Appearance: She is well-developed.   Eyes:      General: Lids are normal.   Skin:     Comments: Legs with some mottling and livedo.    Neurological:      Mental Status: She is oriented to person, place, and time.   Psychiatric:         Behavior: Behavior normal.         Thought Content: Thought content normal.               Assessment:       No diagnosis found.          Plan:        There are no diagnoses linked to this encounter.      Cryoglobulinemic vasculitis treated with Rituxan last 3/2023. (We elected to hold due to infections, but insurance was beginning to deny auth on RTX)   HCQ 200mg BID   Evoxac 30mg TID   Do not send to [facility] we prefer [facility] for Cryo labs.    Tramadol PRN    Prednisone 5mg daily, may increase to 10 PRN flares. Only used perhaps 3 times in the last months.    Hep and T. Spot neg 2020    Failed MTX 6/2023 x 2 weeks with malaise and nausea.   With a history of cryoglobulinemic vasculitis significant purpuric that started this winter 2024 and progressed such that we restarted her rituximab receiving her 1st infusion 04/15/2024 and her 2nd on 04/29/2024.  In addition she was having some ball of the foot purpuric almost like stone bruises that were showing up some on the dorsum of the foot after walking that she was curious if this was related she is here today to discuss this.    Labs from 04/29/2024 do show a sed rate of 47 consistent with her active vasculitis.  She has no anemia at this time in her C-reactive protein was within normal limits.  Liver and kidney are within normal limits.  Labs 8/30/24: ESR and CRP completely normal.     3/15/2024 she did have cryoglobulin qualitative showed absent did not trigger the quantitative portion of the test.    Primary Sjogrens:   HCQ 200mg BID   Evoxac 30mg TID typical but 9/2023  we tried salagen    Salagen not preferred despite trial.      Discussed her accommodations I am not sure which I can continue but she has done better with implementation of home work, limited commuting and travel particularly in cold weather. Help with being able to adjust temperature in office when in work. Patient has a temperature dependent disorder that causes precipitation of immune complexes leaving significant rash throughout her extremities. This can be very painful.     No follow-ups on file.  The patient location is: Home [place]  The chief complaint leading to consultation is: medication management and f/u   Visit type: Virtual visit with synchronous audio and video  Total time spent with patient: 30  Each patient to whom he or she provides medical services by telemedicine is:  (1) informed of the relationship between the physician and patient and the respective role of any other health care provider with respect to management of the patient; and (2) notified that he or she may decline to receive medical services by telemedicine and may withdraw from such care at any time.            Thank you for allowing me to participate in the care of this very pleasant patient.

## 2025-03-30 ENCOUNTER — RESULTS FOLLOW-UP (OUTPATIENT)
Dept: RHEUMATOLOGY | Facility: CLINIC | Age: 45
End: 2025-03-30

## 2025-04-10 DIAGNOSIS — D84.9 IMMUNOSUPPRESSION: ICD-10-CM

## 2025-04-10 DIAGNOSIS — D89.1 CRYOGLOBULINEMIC VASCULITIS: Primary | ICD-10-CM

## 2025-04-10 DIAGNOSIS — Z79.899 HIGH RISK MEDICATIONS (NOT ANTICOAGULANTS) LONG-TERM USE: ICD-10-CM

## 2025-04-11 ENCOUNTER — TELEPHONE (OUTPATIENT)
Dept: RHEUMATOLOGY | Facility: CLINIC | Age: 45
End: 2025-04-11
Payer: COMMERCIAL

## 2025-04-11 ENCOUNTER — TELEPHONE (OUTPATIENT)
Dept: INFUSION THERAPY | Facility: HOSPITAL | Age: 45
End: 2025-04-11
Payer: COMMERCIAL

## 2025-04-11 RX ORDER — ACETAMINOPHEN 325 MG/1
650 TABLET ORAL
OUTPATIENT
Start: 2025-04-11

## 2025-04-11 RX ORDER — METHYLPREDNISOLONE SOD SUCC 125 MG
100 VIAL (EA) INJECTION
OUTPATIENT
Start: 2025-04-11

## 2025-04-11 RX ORDER — MEPERIDINE HYDROCHLORIDE 50 MG/ML
25 INJECTION INTRAMUSCULAR; INTRAVENOUS; SUBCUTANEOUS
OUTPATIENT
Start: 2025-04-11 | End: 2025-04-12

## 2025-04-11 RX ORDER — FAMOTIDINE 10 MG/ML
20 INJECTION, SOLUTION INTRAVENOUS
OUTPATIENT
Start: 2025-04-11

## 2025-04-11 NOTE — TELEPHONE ENCOUNTER
----- Message from Beatriz sent at 4/11/2025 10:07 AM CDT -----  Type: Needs Medical AdviceWho Called:  pt Symptoms (please be specific):  How long has patient had these symptoms:  Pharmacy name and phone #:  Best Call Back Number:  [phone] Information: pt is requesting a call back to reschedule her appt on 4/14

## 2025-04-11 NOTE — TELEPHONE ENCOUNTER
Patient states she will call the infusion center and cancel 4/14/25 appointment due to she was not aware of appointment and there is concerns about prior auth.  She will have labs done 4/14/25 at [address]

## 2025-04-11 NOTE — PROGRESS NOTES
Reordered & signed rituximab orders. Patient due for annual Hep B, Hep C, and TB. Ordered      Advisory for pregnancy test. Seems patient may not be postmenopausal. Ordered HCG, quantitative     Staff,  Please reach out to patient to schedule needed labs. Thanks!

## 2025-04-11 NOTE — TELEPHONE ENCOUNTER
Spoke with the patient and she insisted on scheduling for September due to the weather getting colder. She will call if she needs to come sooner

## 2025-04-14 ENCOUNTER — LAB VISIT (OUTPATIENT)
Dept: LAB | Facility: HOSPITAL | Age: 45
End: 2025-04-14
Attending: INTERNAL MEDICINE
Payer: COMMERCIAL

## 2025-04-14 DIAGNOSIS — D84.9 IMMUNOSUPPRESSION: ICD-10-CM

## 2025-04-14 DIAGNOSIS — Z79.899 HIGH RISK MEDICATIONS (NOT ANTICOAGULANTS) LONG-TERM USE: ICD-10-CM

## 2025-04-14 DIAGNOSIS — D89.1 CRYOGLOBULINEMIC VASCULITIS: ICD-10-CM

## 2025-04-14 LAB
HBV CORE AB SERPL QL IA: NORMAL
HBV SURFACE AG SERPL QL IA: NORMAL
HCG INTACT+B SERPL-ACNC: <2.4 MIU/ML
HCV AB SERPL QL IA: NORMAL

## 2025-04-14 PROCEDURE — 86480 TB TEST CELL IMMUN MEASURE: CPT

## 2025-04-14 PROCEDURE — 84702 CHORIONIC GONADOTROPIN TEST: CPT

## 2025-04-14 PROCEDURE — 86803 HEPATITIS C AB TEST: CPT

## 2025-04-14 PROCEDURE — 36415 COLL VENOUS BLD VENIPUNCTURE: CPT | Mod: PO

## 2025-04-14 PROCEDURE — 86704 HEP B CORE ANTIBODY TOTAL: CPT

## 2025-04-14 PROCEDURE — 87340 HEPATITIS B SURFACE AG IA: CPT

## 2025-04-15 LAB
MITOGEN MINUS NIL (OHS): 9.97
NIL TB SYNCED (OHS): 0.03
QUANTIFERON GOLD INTERP (OHS): NEGATIVE
TB1 AG MINUS NIL (OHS): 0.02
TB2 AG MINUS NIL (OHS): 0.03

## 2025-04-17 ENCOUNTER — PATIENT MESSAGE (OUTPATIENT)
Dept: RHEUMATOLOGY | Facility: CLINIC | Age: 45
End: 2025-04-17
Payer: COMMERCIAL

## 2025-04-24 ENCOUNTER — OFFICE VISIT (OUTPATIENT)
Dept: FAMILY MEDICINE | Facility: CLINIC | Age: 45
End: 2025-04-24
Payer: COMMERCIAL

## 2025-04-24 VITALS
BODY MASS INDEX: 21.17 KG/M2 | OXYGEN SATURATION: 98 % | HEIGHT: 66 IN | WEIGHT: 131.75 LBS | SYSTOLIC BLOOD PRESSURE: 116 MMHG | DIASTOLIC BLOOD PRESSURE: 80 MMHG | HEART RATE: 84 BPM

## 2025-04-24 DIAGNOSIS — M25.50 RECURRENT JOINT PAIN: ICD-10-CM

## 2025-04-24 DIAGNOSIS — M24.9 HYPERMOBILE JOINTS: Primary | ICD-10-CM

## 2025-04-24 PROCEDURE — 99999 PR PBB SHADOW E&M-EST. PATIENT-LVL V: CPT | Mod: PBBFAC,,, | Performed by: INTERNAL MEDICINE

## 2025-04-24 NOTE — PROGRESS NOTES
Assessment and Plan:    1. Hypermobile joints (Primary)  - Ambulatory referral/consult to Genetics; Future  - Ambulatory referral/consult to Physical Medicine Rehab; Future    2. Recurrent joint pain  - Ambulatory referral/consult to Genetics; Future  - Ambulatory referral/consult to Physical Medicine Rehab; Future    Long discussion with patient today about hypermobility disorders.  We discussed the difference between provably genetic Orion-Danlos syndrome with other associated complications (vascular, dermatologic, bleeding, scoliosis, hypotonia etc) vs hypermobile EDS.  We discussed that the condition sometimes called hypermobile EDS without known genetic cause somewhat controversial.  We discussed that based on my knowledge of the subject matter, my opinion is that this subtype would be more accurately characterized as joint hypermobility spectrum.  We discussed that it is reasonable to meet with a  to discuss testing for Orion-Danlos syndrome.  We discussed that if she has negative testing for Orion-Danlos syndrome genetic subtypes I would recommend focusing on physical modalities to treat the consequences of joint hypermobility.  She was referred per her request to the hypermobility clinic on the [place].  We discussed also potentially looking for a physical therapist that has specialized training in treating patients with joint hypermobility.  ______________________________________________________________________  Subjective:    Chief Complaint:  Bruising, minor injuries    HPI:  Shamika is a 44 y.o. year old female here for evaluation of bruising and minor injuries.     She reports today that for the past six months she has been easily getting injured from mild are seemingly non-existent triggers.  She reports that she notices bruising on her feet and hands with barely bumping into anything, or just running on a treadmill.  She reports that she will get ligament injuries by simply bumping  "her hand on a door jam.  She reports that she is concerned she could have hypermobility or EDS.    She is notably already followed by Rheumatology for cryoglobulinemic vasculitis.  She had reached out to her rheumatologist to discuss her concerns about the hypermobility in injuries and was advised that there was not any rheumatologic or autoimmune disorder that would be associated with the injuries that she describes.    She reports that she has always been very prone to injury.  She reports that she has had many sprains and strains in her life.  She has not have any known family history of Orion-Danlos syndrome that she is aware of.    Medications:  Medications Ordered Prior to Encounter[1]    Review of Systems:  Review of Systems   Musculoskeletal:  Positive for arthralgias and joint swelling.   Neurological:  Negative for dizziness and light-headedness.   Hematological:  Bruises/bleeds easily.       Past Medical History:  Past Medical History:   Diagnosis Date    Allergy     previously on immunotherapy    Anxiety     Bleeding disorder     Depression     Facial trauma     Food allergy     Headache     Lumbago with sciatica, right side     Lumbar disc disease     L4-L5    Migraine headache     Osteoarthritis     Other chronic pain     Panic disorder     Positive CHELSEA (antinuclear antibody)     Purpura     Seasonal allergies     Sjogren's syndrome     Vasculitis        Objective:    Vitals:  Vitals:    04/24/25 1031   BP: 116/80   Pulse: 84   SpO2: 98%   Weight: 59.8 kg (131 lb 11.6 oz)   Height: 5' 6" (1.676 m)   PainSc: 0-No pain       Physical Exam  Vitals reviewed.   Constitutional:       General: She is not in acute distress.     Appearance: She is well-developed.   Eyes:      General:         Right eye: No discharge.         Left eye: No discharge.      Conjunctiva/sclera: Conjunctivae normal.   Cardiovascular:      Rate and Rhythm: Normal rate and regular rhythm.   Pulmonary:      Effort: Pulmonary effort is " normal. No respiratory distress.   Skin:     General: Skin is warm and dry.   Neurological:      Mental Status: She is alert and oriented to person, place, and time.   Psychiatric:         Behavior: Behavior normal.         Thought Content: Thought content normal.         Judgment: Judgment normal.         Data:  CBC from last month with normal platelets    Julia Sargent MD  Internal Medicine         [1]   Current Outpatient Medications on File Prior to Visit   Medication Sig Dispense Refill    azelastine (ASTELIN) 137 mcg (0.1 %) nasal spray 1 spray (137 mcg total) by Nasal route 2 (two) times daily. 30 mL 0    buPROPion (WELLBUTRIN) 75 MG tablet Take 75 mg by mouth once daily.      bupropion HBr (APLENZIN ORAL)       busPIRone (BUSPAR) 10 MG tablet Take 10 mg by mouth 3 (three) times daily.      cevimeline (EVOXAC) 30 mg capsule TAKE 1 CAPSULE BY MOUTH THREE TIMES DAILY 90 capsule 0    cevimeline (EVOXAC) 30 mg capsule Take 1 capsule (30 mg total) by mouth 3 (three) times daily. 90 capsule 11    ergocalciferol, vitamin D2, (VITAMIN D ORAL) Take by mouth.      hydroxychloroquine (PLAQUENIL) 200 mg tablet Take 1 tablet (200 mg total) by mouth 2 (two) times a day. 60 tablet 11    LYLLANA 0.05 mg/24 hr 1 patch twice a week.      methylphenidate HCl (RITALIN) 20 MG tablet Take 20 mg by mouth once daily.      montelukast (SINGULAIR) 10 mg tablet TAKE 1 TABLET(10 MG) BY MOUTH EVERY EVENING 90 tablet 3    predniSONE (DELTASONE) 5 MG tablet Take 1-4 tablets (5-20 mg total) by mouth daily as needed (pain). 75 tablet 3    progesterone (PROMETRIUM) 100 MG capsule Take 1 capsule every day by oral route at bedtime for 30 days.      rimegepant (NURTEC) 75 mg odt Dissolve 1 tablet by mouth every other day for migraine prevention 16 tablet 11    traMADoL (ULTRAM) 50 mg tablet Take 1 tablet (50 mg total) by mouth every 12 (twelve) hours as needed for Pain (joint pains dx Primary Sjogrens with vasculitis.). 40 tablet 3    VYVANSE 30  mg capsule Take 30 mg by mouth nightly.      ALPRAZolam (XANAX) 0.5 MG tablet Take 0.5 mg by mouth daily as needed.      EPINEPHrine (EPIPEN 2-CA) 0.3 mg/0.3 mL AtIn Inject 0.6 mLs (0.6 mg total) into the muscle once. for 1 dose (Patient not taking: Reported on 4/24/2025) 0.6 mL 0     No current facility-administered medications on file prior to visit.

## 2025-04-28 ENCOUNTER — PATIENT MESSAGE (OUTPATIENT)
Dept: FAMILY MEDICINE | Facility: CLINIC | Age: 45
End: 2025-04-28
Payer: COMMERCIAL

## 2025-04-28 DIAGNOSIS — M24.9 HYPERMOBILE JOINTS: Primary | ICD-10-CM

## 2025-05-06 ENCOUNTER — TELEPHONE (OUTPATIENT)
Dept: INFUSION THERAPY | Facility: HOSPITAL | Age: 45
End: 2025-05-06
Payer: COMMERCIAL

## 2025-05-06 ENCOUNTER — PATIENT MESSAGE (OUTPATIENT)
Dept: RHEUMATOLOGY | Facility: CLINIC | Age: 45
End: 2025-05-06
Payer: COMMERCIAL

## 2025-05-06 ENCOUNTER — OFFICE VISIT (OUTPATIENT)
Dept: GASTROENTEROLOGY | Facility: CLINIC | Age: 45
End: 2025-05-06
Payer: COMMERCIAL

## 2025-05-06 VITALS — WEIGHT: 131.19 LBS | HEIGHT: 66 IN | BODY MASS INDEX: 21.08 KG/M2

## 2025-05-06 DIAGNOSIS — Z12.11 SCREENING FOR COLON CANCER: ICD-10-CM

## 2025-05-06 DIAGNOSIS — R12 HEARTBURN: ICD-10-CM

## 2025-05-06 DIAGNOSIS — R13.10 DYSPHAGIA, UNSPECIFIED TYPE: Primary | ICD-10-CM

## 2025-05-06 DIAGNOSIS — K21.9 GASTROESOPHAGEAL REFLUX DISEASE, UNSPECIFIED WHETHER ESOPHAGITIS PRESENT: ICD-10-CM

## 2025-05-06 DIAGNOSIS — R09.89 CHOKING SENSATION: ICD-10-CM

## 2025-05-06 PROCEDURE — 99999 PR PBB SHADOW E&M-EST. PATIENT-LVL V: CPT | Mod: PBBFAC,,,

## 2025-05-06 PROCEDURE — 99203 OFFICE O/P NEW LOW 30 MIN: CPT | Mod: S$GLB,,,

## 2025-05-06 RX ORDER — ESTRADIOL 0.1 MG/G
CREAM VAGINAL
COMMUNITY
Start: 2025-02-11

## 2025-05-06 RX ORDER — FAMOTIDINE 20 MG/1
20 TABLET, FILM COATED ORAL 2 TIMES DAILY
Qty: 60 TABLET | Refills: 2 | Status: SHIPPED | OUTPATIENT
Start: 2025-05-06 | End: 2025-08-04

## 2025-05-06 RX ORDER — PROPRANOLOL HYDROCHLORIDE 60 MG/1
60 CAPSULE, EXTENDED RELEASE ORAL
COMMUNITY
Start: 2025-03-08

## 2025-05-06 NOTE — TELEPHONE ENCOUNTER
----- Message from Alberta sent at 5/6/2025 11:41 AM CDT -----  Type: Needs Medical AdviceWho Called:  PatientSymptoms (please be specific):  How long has patient had these symptoms:  Pharmacy name and phone #:  Best Call Back Number: [phone] Information: Patient is requesting to schedule an infusion on 5/19 at 9:30 or 6/2 at 9:00.

## 2025-05-06 NOTE — PROGRESS NOTES
Subjective:       Patient ID: Shamika Young is a 44 y.o. female Body mass index is 21.17 kg/m².    Chief Complaint: Gastroesophageal Reflux and Dysphagia    This patient is new to me.     Gastroesophageal Reflux  She complains of belching (Occasional once weekly), choking (at times), coughing (Intermittent after swallowing), dysphagia (CC: started about 6 m ago; sensation solid food & sometimes pills get stuck in throat requiring her to drink liquids to push items down; uses OTC throat spray & adds avocado oil to meal for help lubricate; denies use of Heimlich maneuver; risk: Sjogren's), early satiety (Associates with tirzepatide 2.5 mg once weekly she is currently taking for inflammation), heartburn, a hoarse voice (Associates with recent sinus issues that she has been recently getting over), nausea (Associates with tirzepatide 2.5 mg once weekly she is currently taking for inflammation), a sore throat (Associates with recent sinus issues that she has been recently getting over), tooth decay (Reports going to her dentist recently that reported she may have silent reflux due to changes in dentition) and water brash (Occasional). She reports no abdominal pain (Denies pain currently, but has experienced intermittent stomach pain in the morning after drinking coffee, which she associates with possible lactose intolerance; described as a cramp that improves after BMs), no chest pain or no globus sensation. This is a chronic problem. The current episode started more than 1 year ago. The problem occurs occasionally. The problem has been waxing and waning. The heartburn duration is an hour. The heartburn is located in the substernum. The heartburn is of mild intensity. The heartburn does not wake her from sleep. The heartburn changes with position. The symptoms are aggravated by stress, lying down and certain foods (Late night eating which she avoids and fried foods). Associated symptoms include fatigue and weight loss  (currently on tirzepatide 2.5 mg once weekly for inflammation, which may be contributing to weight loss). Pertinent negatives include no anemia or melena. Denies diarrhea or constipation; typically has 1 BM daily rated stool 2 or 4 on Farrell scale; takes psyllium fiber supplement daily.  Denies family history of colon cancer/IBD.  Denies past history of colonoscopy. Risk factors include caffeine use and NSAIDs (Takes a leave p.r.n. for joint pain and drinks coffee daily). She has tried a histamine-2 antagonist, an antacid and a PPI (Currently taking Pepcid complete or Tums p.r.n. before bed once every couple of weeks with improvement; past treatments: Nexium) for the symptoms. Past procedures do not include an abdominal ultrasound, an EGD, esophageal manometry, esophageal pH monitoring, H. pylori antibody titer or a UGI. Past invasive treatments do not include gastroplasty, gastroplication or reflux surgery.     Review of Systems   Constitutional:  Positive for appetite change, fatigue and weight loss (currently on tirzepatide 2.5 mg once weekly for inflammation, which may be contributing to weight loss). Negative for activity change, chills, diaphoresis, fever and unexpected weight change.   HENT:  Positive for hoarse voice (Associates with recent sinus issues that she has been recently getting over), sore throat (Associates with recent sinus issues that she has been recently getting over) and trouble swallowing.    Respiratory:  Positive for cough (Intermittent after swallowing) and choking (at times). Negative for shortness of breath.    Cardiovascular:  Negative for chest pain.   Gastrointestinal:  Positive for dysphagia (CC: started about 6 m ago; sensation solid food & sometimes pills get stuck in throat requiring her to drink liquids to push items down; uses OTC throat spray & adds avocado oil to meal for help lubricate; denies use of Heimlich maneuver; risk: Sjogren's), heartburn and nausea (Associates with  tirzepatide 2.5 mg once weekly she is currently taking for inflammation). Negative for abdominal distention, abdominal pain (Denies pain currently, but has experienced intermittent stomach pain in the morning after drinking coffee, which she associates with possible lactose intolerance; described as a cramp that improves after BMs), anal bleeding, blood in stool, constipation, diarrhea, melena, rectal pain and vomiting.       Patient's last menstrual period was 05/02/2025 (exact date).  Past Medical History:   Diagnosis Date    Allergy     previously on immunotherapy    Anxiety     Bleeding disorder     Depression     Facial trauma     Food allergy     GERD (gastroesophageal reflux disease)     Headache     Lumbago with sciatica, right side     Lumbar disc disease     L4-L5    Migraine headache     Osteoarthritis     Other chronic pain     Panic disorder     Positive CHELSEA (antinuclear antibody)     Purpura     Seasonal allergies     Sjogren's syndrome     Vasculitis      Past Surgical History:   Procedure Laterality Date    ADENOIDECTOMY  1995    CYST REMOVAL  2012    mandibular    HYSTEROSCOPY N/A 07/17/2020    Procedure: HYSTEROSCOPY/ with IUD removal;  Surgeon: Cherry Christine MD;  Location: [facility];  Service: OB/GYN;  Laterality: N/A;    INTRAUTERINE DEVICE INSERTION  07/17/2020    Procedure: INSERTION, INTRAUTERINE DEVICE;  Surgeon: Cherry Christine MD;  Location: [facility];  Service: OB/GYN;;    lumbar spine procedure      2004/2014    REMOVAL OF INTRAUTERINE DEVICE (IUD)  07/17/2020    Procedure: REMOVAL, INTRAUTERINE DEVICE;  Surgeon: Cherry Christine MD;  Location: [facility];  Service: OB/GYN;;    TONSILLECTOMY  1995    TONSILLECTOMY AND ADENOIDECTOMY      7th grade    turbonate reduction Bilateral 12/06/2023     Family History   Problem Relation Name Age of Onset    Multiple sclerosis Mother Kelley Maynor     Heart disease Mother Kelley Maynor     Diabetes Mother Kelley Maynor     Hypertension Mother  Kelley Maynor     Heart failure Mother Kelley Maynor     Migraines Mother Kelley Maynor     Irritable bowel syndrome Mother Kelley Maynor     Heart disease Father Gene Maynor     Hyperlipidemia Father Gene Maynor     Heart failure Father Gene Maynor     Migraines Sister Phyllis Ramos     Glaucoma Neg Hx      Retinal detachment Neg Hx      Macular degeneration Neg Hx      Colon cancer Neg Hx      Crohn's disease Neg Hx      Ulcerative colitis Neg Hx      Stomach cancer Neg Hx      Esophageal cancer Neg Hx       Social History[1]  Wt Readings from Last 10 Encounters:   05/06/25 59.5 kg (131 lb 2.8 oz)   04/24/25 59.8 kg (131 lb 11.6 oz)   11/08/24 65.2 kg (143 lb 11.8 oz)   11/05/24 66.8 kg (147 lb 4.3 oz)   10/28/24 66.8 kg (147 lb 4.3 oz)   10/14/24 62.1 kg (136 lb 14.5 oz)   10/07/24 61.7 kg (136 lb)   08/12/24 62.1 kg (136 lb 14.5 oz)   05/20/24 62.1 kg (137 lb 0.3 oz)   05/06/24 60.7 kg (133 lb 14.9 oz)     Lab Results   Component Value Date    WBC 4.60 03/10/2025    HGB 13.0 03/10/2025    HCT 40.8 03/10/2025    MCV 95 03/10/2025     03/10/2025     CMP  Sodium   Date Value Ref Range Status   10/14/2024 138 136 - 145 mmol/L Final     Potassium   Date Value Ref Range Status   10/14/2024 4.1 3.5 - 5.1 mmol/L Final     Chloride   Date Value Ref Range Status   10/14/2024 107 95 - 110 mmol/L Final     CO2   Date Value Ref Range Status   10/14/2024 23 23 - 29 mmol/L Final     Glucose   Date Value Ref Range Status   10/14/2024 94 70 - 110 mg/dL Final     BUN   Date Value Ref Range Status   10/14/2024 16 6 - 20 mg/dL Final     Creatinine   Date Value Ref Range Status   03/10/2025 0.8 0.5 - 1.4 mg/dL Final     Calcium   Date Value Ref Range Status   10/14/2024 9.0 8.7 - 10.5 mg/dL Final     Total Protein   Date Value Ref Range Status   10/14/2024 7.5 6.0 - 8.4 g/dL Final     Albumin   Date Value Ref Range Status   10/14/2024 3.7 3.5 - 5.2 g/dL Final     Total Bilirubin   Date Value Ref Range Status    10/14/2024 0.4 0.1 - 1.0 mg/dL Final     Comment:     For infants and newborns, interpretation of results should be based  on gestational age, weight and in agreement with clinical  observations.    Premature Infant recommended reference ranges:  Up to 24 hours.............<8.0 mg/dL  Up to 48 hours............<12.0 mg/dL  3-5 days..................<15.0 mg/dL  6-29 days.................<15.0 mg/dL       Alkaline Phosphatase   Date Value Ref Range Status   10/14/2024 61 55 - 135 U/L Final     AST   Date Value Ref Range Status   03/10/2025 18 10 - 40 U/L Final     ALT   Date Value Ref Range Status   03/10/2025 14 10 - 44 U/L Final     Anion Gap   Date Value Ref Range Status   10/14/2024 8 8 - 16 mmol/L Final     eGFR if    Date Value Ref Range Status   12/29/2021 113 > OR = 60 mL/min/1.73m2 Final     eGFR if non    Date Value Ref Range Status   12/29/2021 97 > OR = 60 mL/min/1.73m2 Final     Lab Results   Component Value Date    TSH 1.020 07/02/2020     Reviewed prior medical records including radiology report of chest x-ray 04/21/2023, pelvis ultrasound 04/04/2022 & endoscopy history (see surgical history).    Objective:      Physical Exam  Vitals and nursing note reviewed.   Constitutional:       General: She is not in acute distress.     Appearance: Normal appearance. She is normal weight. She is not ill-appearing.   HENT:      Mouth/Throat:      Lips: Pink. No lesions.   Cardiovascular:      Pulses: Normal pulses.      Heart sounds: Normal heart sounds.   Pulmonary:      Effort: Pulmonary effort is normal. No respiratory distress.      Breath sounds: Normal breath sounds.   Abdominal:      General: Abdomen is flat. Bowel sounds are normal. There is no distension or abdominal bruit. There are no signs of injury.      Palpations: Abdomen is soft. There is no shifting dullness, fluid wave, hepatomegaly, splenomegaly or mass.      Tenderness: There is no abdominal tenderness. There  is no guarding or rebound. Negative signs include Camilo's sign, Rovsing's sign and McBurney's sign.   Skin:     General: Skin is warm and dry.      Coloration: Skin is not jaundiced or pale.   Neurological:      Mental Status: She is alert and oriented to person, place, and time.   Psychiatric:         Attention and Perception: Attention normal.         Mood and Affect: Mood normal.         Speech: Speech normal.         Behavior: Behavior normal.         Assessment:       1. Dysphagia, unspecified type    2. Choking sensation    3. Gastroesophageal reflux disease, unspecified whether esophagitis present    4. Heartburn    5. Screening for colon cancer        Plan:       Dysphagia, unspecified type & Choking sensation  - schedule EGD, discussed procedure with patient and possible esophageal dilation may be performed during procedure if indicated, patient verbalized understanding  - recommend to eat smaller more frequent meals and to eat slowly and advised to eat a soft diet. Take medications one at a time with a full glass of water.  - possible UGI/esophagram/esophageal manometry if symptoms persist  - consider PPI after EGD with bx to rule out EoE    Gastroesophageal reflux disease, unspecified whether esophagitis present & Heartburn  - schedule EGD, discussed procedure with patient, including risks and benefits, patient verbalized understanding  -Avoid large meals, avoid eating within 2-3 hours of bedtime (avoid late night eating & lying down soon after eating), elevate head of bed if nocturnal symptoms are present, smoking cessation (if current smoker), & weight loss (if overweight).   -Avoid known foods which trigger reflux symptoms & to minimize/avoid high-fat foods, chocolate, caffeine, citrus, alcohol, & tomato products.  -Avoid/limit use of NSAID's, since they can cause GI upset, bleeding, and/or ulcers. If needed, take with food.  - START: famotidine (PEPCID) 20 MG tablet; Take 1 tablet (20 mg total) by  mouth 2 (two) times daily.  Dispense: 60 tablet; Refill: 2  - consider PPI after EGD with bx to rule out EoE    Screening for colon cancer  - followup for screening colonoscopy at the age of 47  - may continue fiber supplement  -Recommend taking an OTC stool softener such as Colace as directed to avoid hard stools and straining with bowel movements PRN    Follow up in about 2 months (around 7/6/2025), or if symptoms worsen or fail to improve.      If no improvement in symptoms or symptoms worsen, call/follow-up at clinic or go to ER.        Total time spent on the encounter includes face to face time and non-face to face time preparing to see the patient (eg, review of tests), Obtaining and/or reviewing separately obtained history, Documenting clinical information in the electronic or other health record, Independently interpreting results (not separately reported) and communicating results to the patient/family/caregiver, or Care coordination (not separately reported).     A dictation software program was used for this note. Please expect some simple typographical  errors in this note.         [1]   Social History  Tobacco Use    Smoking status: Former     Passive exposure: Current    Smokeless tobacco: Never   Substance Use Topics    Alcohol use: Yes     Alcohol/week: 2.0 standard drinks of alcohol     Types: 2 Drinks containing 0.5 oz of alcohol per week     Comment: socially    Drug use: Never

## 2025-05-06 NOTE — PATIENT INSTRUCTIONS
-Avoid large meals, avoid eating within 2-3 hours of bedtime (avoid late night eating & lying down soon after eating), elevate head of bed if nocturnal symptoms are present, smoking cessation (if current smoker), & weight loss (if overweight).   -Avoid known foods which trigger reflux symptoms & to minimize/avoid high-fat foods, chocolate, caffeine, citrus, alcohol, & tomato products.  -Avoid/limit use of NSAID's, since they can cause GI upset, bleeding, and/or ulcers. If needed, take with food.

## 2025-05-07 ENCOUNTER — PATIENT MESSAGE (OUTPATIENT)
Dept: RHEUMATOLOGY | Facility: CLINIC | Age: 45
End: 2025-05-07
Payer: COMMERCIAL

## 2025-05-08 ENCOUNTER — PATIENT MESSAGE (OUTPATIENT)
Dept: RHEUMATOLOGY | Facility: CLINIC | Age: 45
End: 2025-05-08
Payer: COMMERCIAL

## 2025-05-13 ENCOUNTER — PATIENT MESSAGE (OUTPATIENT)
Dept: RHEUMATOLOGY | Facility: CLINIC | Age: 45
End: 2025-05-13
Payer: COMMERCIAL

## 2025-05-19 ENCOUNTER — INFUSION (OUTPATIENT)
Dept: INFUSION THERAPY | Facility: HOSPITAL | Age: 45
End: 2025-05-19
Attending: INTERNAL MEDICINE
Payer: COMMERCIAL

## 2025-05-19 VITALS
WEIGHT: 129.44 LBS | RESPIRATION RATE: 17 BRPM | OXYGEN SATURATION: 100 % | SYSTOLIC BLOOD PRESSURE: 100 MMHG | DIASTOLIC BLOOD PRESSURE: 59 MMHG | HEART RATE: 85 BPM | HEIGHT: 66 IN | TEMPERATURE: 98 F | BODY MASS INDEX: 20.8 KG/M2

## 2025-05-19 DIAGNOSIS — D89.1 CRYOGLOBULINEMIC VASCULITIS: Primary | ICD-10-CM

## 2025-05-19 PROCEDURE — 96375 TX/PRO/DX INJ NEW DRUG ADDON: CPT | Mod: PN

## 2025-05-19 PROCEDURE — 96365 THER/PROPH/DIAG IV INF INIT: CPT | Mod: PN

## 2025-05-19 PROCEDURE — 96367 TX/PROPH/DG ADDL SEQ IV INF: CPT | Mod: PN

## 2025-05-19 PROCEDURE — A4216 STERILE WATER/SALINE, 10 ML: HCPCS | Mod: PN | Performed by: INTERNAL MEDICINE

## 2025-05-19 PROCEDURE — 96366 THER/PROPH/DIAG IV INF ADDON: CPT | Mod: PN

## 2025-05-19 PROCEDURE — 25000003 PHARM REV CODE 250: Mod: PN | Performed by: INTERNAL MEDICINE

## 2025-05-19 PROCEDURE — 63600175 PHARM REV CODE 636 W HCPCS: Mod: PN | Performed by: INTERNAL MEDICINE

## 2025-05-19 RX ORDER — METHYLPREDNISOLONE SOD SUCC 125 MG
80 VIAL (EA) INJECTION ONCE
OUTPATIENT
Start: 2025-05-26

## 2025-05-19 RX ORDER — MEPERIDINE HYDROCHLORIDE 50 MG/ML
25 INJECTION INTRAMUSCULAR; INTRAVENOUS; SUBCUTANEOUS
OUTPATIENT
Start: 2025-05-26 | End: 2025-05-27

## 2025-05-19 RX ORDER — FAMOTIDINE 10 MG/ML
20 INJECTION, SOLUTION INTRAVENOUS
OUTPATIENT
Start: 2025-05-26

## 2025-05-19 RX ORDER — DIPHENHYDRAMINE HYDROCHLORIDE 50 MG/ML
50 INJECTION, SOLUTION INTRAMUSCULAR; INTRAVENOUS ONCE AS NEEDED
OUTPATIENT
Start: 2025-05-26

## 2025-05-19 RX ORDER — HEPARIN 100 UNIT/ML
500 SYRINGE INTRAVENOUS
OUTPATIENT
Start: 2025-05-26

## 2025-05-19 RX ORDER — SODIUM CHLORIDE 0.9 % (FLUSH) 0.9 %
10 SYRINGE (ML) INJECTION
Status: DISCONTINUED | OUTPATIENT
Start: 2025-05-19 | End: 2025-05-19 | Stop reason: HOSPADM

## 2025-05-19 RX ORDER — METHYLPREDNISOLONE SOD SUCC 125 MG
100 VIAL (EA) INJECTION
Status: COMPLETED | OUTPATIENT
Start: 2025-05-19 | End: 2025-05-19

## 2025-05-19 RX ORDER — EPINEPHRINE 0.3 MG/.3ML
0.3 INJECTION SUBCUTANEOUS ONCE AS NEEDED
Status: DISCONTINUED | OUTPATIENT
Start: 2025-05-19 | End: 2025-05-19 | Stop reason: HOSPADM

## 2025-05-19 RX ORDER — ACETAMINOPHEN 325 MG/1
650 TABLET ORAL
Status: COMPLETED | OUTPATIENT
Start: 2025-05-19 | End: 2025-05-19

## 2025-05-19 RX ORDER — METHYLPREDNISOLONE SOD SUCC 125 MG
100 VIAL (EA) INJECTION
OUTPATIENT
Start: 2025-05-26

## 2025-05-19 RX ORDER — ACETAMINOPHEN 325 MG/1
650 TABLET ORAL
OUTPATIENT
Start: 2025-05-26

## 2025-05-19 RX ORDER — MEPERIDINE HYDROCHLORIDE 25 MG/ML
25 INJECTION INTRAMUSCULAR; INTRAVENOUS; SUBCUTANEOUS
Status: DISCONTINUED | OUTPATIENT
Start: 2025-05-19 | End: 2025-05-19 | Stop reason: HOSPADM

## 2025-05-19 RX ORDER — DIPHENHYDRAMINE HYDROCHLORIDE 50 MG/ML
50 INJECTION, SOLUTION INTRAMUSCULAR; INTRAVENOUS ONCE AS NEEDED
Status: DISCONTINUED | OUTPATIENT
Start: 2025-05-19 | End: 2025-05-19 | Stop reason: HOSPADM

## 2025-05-19 RX ORDER — EPINEPHRINE 0.3 MG/.3ML
0.3 INJECTION SUBCUTANEOUS ONCE AS NEEDED
OUTPATIENT
Start: 2025-05-26

## 2025-05-19 RX ORDER — FAMOTIDINE 10 MG/ML
20 INJECTION, SOLUTION INTRAVENOUS
Status: COMPLETED | OUTPATIENT
Start: 2025-05-19 | End: 2025-05-19

## 2025-05-19 RX ORDER — HEPARIN 100 UNIT/ML
500 SYRINGE INTRAVENOUS
Status: DISCONTINUED | OUTPATIENT
Start: 2025-05-19 | End: 2025-05-19 | Stop reason: HOSPADM

## 2025-05-19 RX ORDER — SODIUM CHLORIDE 0.9 % (FLUSH) 0.9 %
10 SYRINGE (ML) INJECTION
OUTPATIENT
Start: 2025-05-26

## 2025-05-19 RX ADMIN — RITUXIMAB-ABBS 1000 MG: 10 INJECTION, SOLUTION INTRAVENOUS at 10:05

## 2025-05-19 RX ADMIN — METHYLPREDNISOLONE SODIUM SUCCINATE 100 MG: 125 INJECTION, POWDER, FOR SOLUTION INTRAMUSCULAR; INTRAVENOUS at 09:05

## 2025-05-19 RX ADMIN — ACETAMINOPHEN 650 MG: 325 TABLET ORAL at 09:05

## 2025-05-19 RX ADMIN — Medication 10 ML: at 01:05

## 2025-05-19 RX ADMIN — SODIUM CHLORIDE: 9 INJECTION, SOLUTION INTRAVENOUS at 09:05

## 2025-05-19 RX ADMIN — FAMOTIDINE 20 MG: 10 INJECTION INTRAVENOUS at 09:05

## 2025-05-19 RX ADMIN — DIPHENHYDRAMINE HYDROCHLORIDE 25 MG: 50 INJECTION INTRAMUSCULAR; INTRAVENOUS at 09:05

## 2025-05-19 NOTE — PLAN OF CARE
Problem: Adult Inpatient Plan of Care  Goal: Plan of Care Review  Outcome: Progressing  Flowsheets (Taken 5/19/2025 1338)  Plan of Care Reviewed With: patient  Goal: Patient-Specific Goal (Individualized)  Outcome: Progressing  Flowsheets (Taken 5/19/2025 1338)  Individualized Care Needs: recliner, pillow, blanket, conversation  Anxieties, Fears or Concerns: none  Patient/Family-Specific Goals (Include Timeframe): no s/s of reaction with tx     Problem: Fatigue  Goal: Improved Activity Tolerance  Outcome: Progressing  Intervention: Promote Improved Energy  Flowsheets (Taken 5/19/2025 1338)  Fatigue Management: paced activity encouraged  Sleep/Rest Enhancement: natural light exposure provided  Activity Management:   Ambulated -L4   Up in chair - L3  Environmental Support:   environmental consistency promoted   distractions minimized  Pt. tolerated IV Truxima well. VS stable, no adverse reactions noted. Discussed future appointments, NAD noted. Pt. discharged to home ambulatory with no assistance needed.

## 2025-05-20 ENCOUNTER — ANESTHESIA EVENT (OUTPATIENT)
Dept: ENDOSCOPY | Facility: HOSPITAL | Age: 45
End: 2025-05-20
Payer: COMMERCIAL

## 2025-05-20 ENCOUNTER — HOSPITAL ENCOUNTER (OUTPATIENT)
Facility: HOSPITAL | Age: 45
Discharge: HOME | End: 2025-05-20
Attending: INTERNAL MEDICINE | Admitting: INTERNAL MEDICINE
Payer: COMMERCIAL

## 2025-05-20 ENCOUNTER — ANESTHESIA (OUTPATIENT)
Dept: ENDOSCOPY | Facility: HOSPITAL | Age: 45
End: 2025-05-20
Payer: COMMERCIAL

## 2025-05-20 VITALS
HEIGHT: 66 IN | RESPIRATION RATE: 16 BRPM | WEIGHT: 131 LBS | HEART RATE: 82 BPM | OXYGEN SATURATION: 100 % | BODY MASS INDEX: 21.05 KG/M2 | DIASTOLIC BLOOD PRESSURE: 76 MMHG | SYSTOLIC BLOOD PRESSURE: 114 MMHG | TEMPERATURE: 97 F

## 2025-05-20 DIAGNOSIS — R13.10 DYSPHAGIA, UNSPECIFIED TYPE: ICD-10-CM

## 2025-05-20 DIAGNOSIS — R12 HEARTBURN: ICD-10-CM

## 2025-05-20 DIAGNOSIS — K21.9 GASTROESOPHAGEAL REFLUX DISEASE, UNSPECIFIED WHETHER ESOPHAGITIS PRESENT: ICD-10-CM

## 2025-05-20 DIAGNOSIS — R13.10 DYSPHAGIA: ICD-10-CM

## 2025-05-20 LAB
B-HCG UR QL: NEGATIVE
CTP QC/QA: YES

## 2025-05-20 PROCEDURE — 88305 TISSUE EXAM BY PATHOLOGIST: CPT | Mod: TC | Performed by: INTERNAL MEDICINE

## 2025-05-20 PROCEDURE — 63600175 PHARM REV CODE 636 W HCPCS: Mod: PO | Performed by: INTERNAL MEDICINE

## 2025-05-20 PROCEDURE — 27201012 HC FORCEPS, HOT/COLD, DISP: Mod: PO | Performed by: INTERNAL MEDICINE

## 2025-05-20 PROCEDURE — 37000008 HC ANESTHESIA 1ST 15 MINUTES: Mod: PO | Performed by: INTERNAL MEDICINE

## 2025-05-20 PROCEDURE — 43239 EGD BIOPSY SINGLE/MULTIPLE: CPT | Mod: PO | Performed by: INTERNAL MEDICINE

## 2025-05-20 PROCEDURE — 37000009 HC ANESTHESIA EA ADD 15 MINS: Mod: PO | Performed by: INTERNAL MEDICINE

## 2025-05-20 PROCEDURE — 43239 EGD BIOPSY SINGLE/MULTIPLE: CPT | Mod: ,,, | Performed by: INTERNAL MEDICINE

## 2025-05-20 PROCEDURE — 81025 URINE PREGNANCY TEST: CPT | Mod: PO | Performed by: INTERNAL MEDICINE

## 2025-05-20 PROCEDURE — 63600175 PHARM REV CODE 636 W HCPCS: Mod: PO | Performed by: NURSE ANESTHETIST, CERTIFIED REGISTERED

## 2025-05-20 RX ORDER — PROPOFOL 10 MG/ML
VIAL (ML) INTRAVENOUS
Status: DISCONTINUED | OUTPATIENT
Start: 2025-05-20 | End: 2025-05-20

## 2025-05-20 RX ORDER — PANTOPRAZOLE SODIUM 40 MG/1
40 TABLET, DELAYED RELEASE ORAL DAILY
Qty: 30 TABLET | Refills: 2 | Status: SHIPPED | OUTPATIENT
Start: 2025-05-20 | End: 2026-05-20

## 2025-05-20 RX ORDER — LIDOCAINE HYDROCHLORIDE 20 MG/ML
INJECTION INTRAVENOUS
Status: DISCONTINUED | OUTPATIENT
Start: 2025-05-20 | End: 2025-05-20

## 2025-05-20 RX ORDER — SODIUM CHLORIDE 0.9 % (FLUSH) 0.9 %
10 SYRINGE (ML) INJECTION
Status: DISCONTINUED | OUTPATIENT
Start: 2025-05-20 | End: 2025-05-20 | Stop reason: HOSPADM

## 2025-05-20 RX ORDER — SODIUM CHLORIDE, SODIUM LACTATE, POTASSIUM CHLORIDE, CALCIUM CHLORIDE 600; 310; 30; 20 MG/100ML; MG/100ML; MG/100ML; MG/100ML
INJECTION, SOLUTION INTRAVENOUS CONTINUOUS
Status: DISCONTINUED | OUTPATIENT
Start: 2025-05-20 | End: 2025-05-20 | Stop reason: HOSPADM

## 2025-05-20 RX ADMIN — LIDOCAINE HYDROCHLORIDE 75 MG: 20 INJECTION INTRAVENOUS at 12:05

## 2025-05-20 RX ADMIN — PROPOFOL 120 MG: 10 INJECTION, EMULSION INTRAVENOUS at 12:05

## 2025-05-20 RX ADMIN — PROPOFOL 50 MG: 10 INJECTION, EMULSION INTRAVENOUS at 12:05

## 2025-05-20 RX ADMIN — SODIUM CHLORIDE, POTASSIUM CHLORIDE, SODIUM LACTATE AND CALCIUM CHLORIDE: 600; 310; 30; 20 INJECTION, SOLUTION INTRAVENOUS at 11:05

## 2025-05-20 NOTE — DISCHARGE SUMMARY
[facility] - [facility]  Discharge Note  Short Stay  Discharge Note  Short Stay      SUMMARY     Admit Date: 5/20/2025    Attending Physician: Terrence Jones MD     Discharge Physician: Terrence Jones MD    Discharge Date: 5/20/2025 12:41 PM    Final Diagnosis: Dysphagia, unspecified type [R13.10]  Heartburn [R12]  Gastroesophageal reflux disease, unspecified whether esophagitis present [K21.9]    Disposition: HOME OR SELF CARE    Patient Instructions:   Current Discharge Medication List        START taking these medications    Details   pantoprazole (PROTONIX) 40 MG tablet Take 1 tablet (40 mg total) by mouth once daily.  Qty: 30 tablet, Refills: 2           CONTINUE these medications which have NOT CHANGED    Details   ALPRAZolam (XANAX) 0.5 MG tablet Take 0.5 mg by mouth daily as needed.      buPROPion (WELLBUTRIN) 75 MG tablet Take 75 mg by mouth once daily.      !! cevimeline (EVOXAC) 30 mg capsule TAKE 1 CAPSULE BY MOUTH THREE TIMES DAILY  Qty: 90 capsule, Refills: 0    Associated Diagnoses: Cryoglobulinemic vasculitis; Sjogren's syndrome without extraglandular involvement; Raynaud's phenomenon without gangrene; Urticaria      ergocalciferol, vitamin D2, (VITAMIN D ORAL) Take by mouth.      famotidine (PEPCID) 20 MG tablet Take 1 tablet (20 mg total) by mouth 2 (two) times daily.  Qty: 60 tablet, Refills: 2    Associated Diagnoses: Gastroesophageal reflux disease, unspecified whether esophagitis present      hydroxychloroquine (PLAQUENIL) 200 mg tablet Take 1 tablet (200 mg total) by mouth 2 (two) times a day.  Qty: 60 tablet, Refills: 11    Associated Diagnoses: Sjogren's syndrome without extraglandular involvement; Cryoglobulinemic vasculitis      methylphenidate HCl (RITALIN) 20 MG tablet Take 20 mg by mouth once daily.      predniSONE (DELTASONE) 5 MG tablet Take 1-4 tablets (5-20 mg total) by mouth daily as needed (pain).  Qty: 75 tablet, Refills: 3      propranoloL (INDERAL LA) 60 MG 24 hr  capsule Take 60 mg by mouth.      tirzepatide 2.5 mg/0.5 mL PnIj Inject 2.5 mg into the skin every 7 days.      traMADoL (ULTRAM) 50 mg tablet Take 1 tablet (50 mg total) by mouth every 12 (twelve) hours as needed for Pain (joint pains dx Primary Sjogrens with vasculitis.).  Qty: 40 tablet, Refills: 3    Comments: Chronic pain >7 days medically necessary override dx code G89.4. Please fill now, there was confusion on the prior script from [facility] as to when patient needed.  Associated Diagnoses: Sjogren's syndrome without extraglandular involvement; Cryoglobulinemic vasculitis; Inflammatory polyarthritis      VYVANSE 30 mg capsule Take 30 mg by mouth nightly.      azelastine (ASTELIN) 137 mcg (0.1 %) nasal spray 1 spray (137 mcg total) by Nasal route 2 (two) times daily.  Qty: 30 mL, Refills: 0      bupropion HBr (APLENZIN ORAL)       busPIRone (BUSPAR) 10 MG tablet Take 10 mg by mouth 3 (three) times daily.      !! cevimeline (EVOXAC) 30 mg capsule Take 1 capsule (30 mg total) by mouth 3 (three) times daily.  Qty: 90 capsule, Refills: 11    Associated Diagnoses: Cryoglobulinemic vasculitis; Sjogren's syndrome without extraglandular involvement; Raynaud's phenomenon without gangrene; Urticaria      EPINEPHrine (EPIPEN 2-CA) 0.3 mg/0.3 mL AtIn Inject 0.6 mLs (0.6 mg total) into the muscle once. for 1 dose  Qty: 0.6 mL, Refills: 0    Associated Diagnoses: History of anaphylaxis      estradioL (ESTRACE) 0.01 % (0.1 mg/gram) vaginal cream Place vaginally.      LYLLANA 0.05 mg/24 hr 1 patch twice a week.      montelukast (SINGULAIR) 10 mg tablet TAKE 1 TABLET(10 MG) BY MOUTH EVERY EVENING  Qty: 90 tablet, Refills: 3      progesterone (PROMETRIUM) 100 MG capsule Take 1 capsule every day by oral route at bedtime for 30 days.      rimegepant (NURTEC) 75 mg odt Dissolve 1 tablet by mouth every other day for migraine prevention  Qty: 16 tablet, Refills: 11    Associated Diagnoses: Chronic migraine without aura without status  migrainosus, not intractable       !! - Potential duplicate medications found. Please discuss with provider.          Discharge Procedure Orders (must include Diet, Follow-up, Activity)    Follow Up:  Follow up with PCP as previously scheduled  Resume routine diet.  Activity as tolerated.    No driving day of procedure.

## 2025-05-20 NOTE — ANESTHESIA POSTPROCEDURE EVALUATION
Anesthesia Post Evaluation    Patient: Shamika Young    Procedure(s) Performed: Procedure(s) (LRB):  EGD (ESOPHAGOGASTRODUODENOSCOPY) (N/A)    Final Anesthesia Type: general      Patient location during evaluation: PACU  Patient participation: Yes- Able to Participate  Level of consciousness: awake and alert and oriented  Post-procedure vital signs: reviewed and stable  Pain management: adequate  Airway patency: patent    PONV status at discharge: No PONV  Anesthetic complications: no      Cardiovascular status: blood pressure returned to baseline and stable  Respiratory status: unassisted and spontaneous ventilation  Hydration status: euvolemic  Follow-up not needed.              Vitals Value Taken Time   /76 05/20/25 12:50   Temp 36.2 °C (97.2 °F) 05/20/25 12:40   Pulse 82 05/20/25 12:50   Resp 16 05/20/25 12:50   SpO2 100 % 05/20/25 12:50         No case tracking events are documented in the log.      Pain/Pedro Score: Pedro Score: 8 (5/20/2025 12:40 PM)

## 2025-05-20 NOTE — PROVATION PATIENT INSTRUCTIONS
Discharge Summary/Instructions after an Endoscopic Procedure  Patient Name: Shamika Young  Patient MRN: [id number]  Patient YOB: 1980  Tuesday, May 20, 2025  Terrence Jones MD  Dear patient,  As a result of recent federal legislation (The Federal Cures Act), you may   receive lab or pathology results from your procedure in your [facility]   account before your physician is able to contact you. Your physician or   their representative will relay the results to you with their   recommendations at their soonest availability.  Thank you,  RESTRICTIONS:  During your procedure today, you received medications for sedation.  These   medications may affect your judgment, balance and coordination.  Therefore,   for 24 hours, you have the following restrictions:   - DO NOT drive a car, operate machinery, make legal/financial decisions,   sign important papers or drink alcohol.    ACTIVITY:  Today: no heavy lifting, straining or running due to procedural   sedation/anesthesia.  The following day: return to full activity including work.  DIET:  Eat and drink normally unless instructed otherwise.     TREATMENT FOR COMMON SIDE EFFECTS:  - Mild abdominal pain, nausea, belching, bloating or excessive gas:  rest,   eat lightly and use a heating pad.  - Sore Throat: treat with throat lozenges and/or gargle with warm salt   water.  - Because air was used during the procedure, expelling large amounts of air   from your rectum or belching is normal.  - If a bowel prep was taken, you may not have a bowel movement for 1-3 days.    This is normal.  SYMPTOMS TO WATCH FOR AND REPORT TO YOUR PHYSICIAN:  1. Abdominal pain or bloating, other than gas cramps.  2. Chest pain.  3. Back pain.  4. Signs of infection such as: chills or fever occurring within 24 hours   after the procedure.  5. Rectal bleeding, which would show as bright red, maroon, or black stools.   (A tablespoon of blood from the rectum is not serious, especially  if   hemorrhoids are present.)  6. Vomiting.  7. Weakness or dizziness.  GO DIRECTLY TO THE NEAREST EMERGENCY ROOM IF YOU HAVE ANY OF THE FOLLOWING:      Difficulty breathing              Chills and/or fever over 101 F   Persistent vomiting and/or vomiting blood   Severe abdominal pain   Severe chest pain   Black, tarry stools   Bleeding- more than one tablespoon   Any other symptom or condition that you feel may need urgent attention  Your doctor recommends these additional instructions:  If any biopsies were taken, your doctors clinic will contact you in 1 to 2   weeks with any results.  We are waiting for your pathology results.   Continue your present medications.   You are being discharged to home.  For questions, problems or results please call your physician - Terrence Jones MD at Work:  [phone].  EMERGENCY PHONE NUMBER: [phone], LAB RESULTS: [phone]  IF A COMPLICATION OR EMERGENCY SITUATION ARISES AND YOU ARE UNABLE TO REACH   YOUR PHYSICIAN - GO DIRECTLY TO THE EMERGENCY ROOM.  ___________________________________________  Nurse Signature  ___________________________________________  Patient/Designated Responsible Party Signature  Terrence Jones MD  5/20/2025 12:39:29 PM  This report has been verified and signed electronically.  Dear patient,  As a result of recent federal legislation (The Federal Cures Act), you may   receive lab or pathology results from your procedure in your [facility]   account before your physician is able to contact you. Your physician or   their representative will relay the results to you with their   recommendations at their soonest availability.  Thank you.  PROVATION

## 2025-05-20 NOTE — H&P
History & Physical - Short Stay  Gastroenterology      SUBJECTIVE:     Procedure: EGD    Chief Complaint/Indication for Procedure: Dysphagia and Reflux    PTA Medications   Medication Sig    ALPRAZolam (XANAX) 0.5 MG tablet Take 0.5 mg by mouth daily as needed.    buPROPion (WELLBUTRIN) 75 MG tablet Take 75 mg by mouth once daily.    cevimeline (EVOXAC) 30 mg capsule TAKE 1 CAPSULE BY MOUTH THREE TIMES DAILY    ergocalciferol, vitamin D2, (VITAMIN D ORAL) Take by mouth.    famotidine (PEPCID) 20 MG tablet Take 1 tablet (20 mg total) by mouth 2 (two) times daily.    hydroxychloroquine (PLAQUENIL) 200 mg tablet Take 1 tablet (200 mg total) by mouth 2 (two) times a day.    methylphenidate HCl (RITALIN) 20 MG tablet Take 20 mg by mouth once daily.    predniSONE (DELTASONE) 5 MG tablet Take 1-4 tablets (5-20 mg total) by mouth daily as needed (pain).    propranoloL (INDERAL LA) 60 MG 24 hr capsule Take 60 mg by mouth.    tirzepatide 2.5 mg/0.5 mL PnIj Inject 2.5 mg into the skin every 7 days.    traMADoL (ULTRAM) 50 mg tablet Take 1 tablet (50 mg total) by mouth every 12 (twelve) hours as needed for Pain (joint pains dx Primary Sjogrens with vasculitis.).    VYVANSE 30 mg capsule Take 30 mg by mouth nightly.    azelastine (ASTELIN) 137 mcg (0.1 %) nasal spray 1 spray (137 mcg total) by Nasal route 2 (two) times daily.    bupropion HBr (APLENZIN ORAL)  (Patient not taking: Reported on 5/6/2025)    busPIRone (BUSPAR) 10 MG tablet Take 10 mg by mouth 3 (three) times daily. (Patient not taking: Reported on 5/6/2025)    cevimeline (EVOXAC) 30 mg capsule Take 1 capsule (30 mg total) by mouth 3 (three) times daily. (Patient not taking: Reported on 5/6/2025)    EPINEPHrine (EPIPEN 2-CA) 0.3 mg/0.3 mL AtIn Inject 0.6 mLs (0.6 mg total) into the muscle once. for 1 dose (Patient not taking: Reported on 4/24/2025)    estradioL (ESTRACE) 0.01 % (0.1 mg/gram) vaginal cream Place vaginally.    LYLLANA 0.05 mg/24 hr 1 patch twice a  week.    montelukast (SINGULAIR) 10 mg tablet TAKE 1 TABLET(10 MG) BY MOUTH EVERY EVENING    progesterone (PROMETRIUM) 100 MG capsule Take 1 capsule every day by oral route at bedtime for 30 days.    rimegepant (NURTEC) 75 mg odt Dissolve 1 tablet by mouth every other day for migraine prevention       Review of patient's allergies indicates:   Allergen Reactions    Pecan nut Anaphylaxis    Tree nut Anaphylaxis    Tree nuts Anaphylaxis    Kelliher Anaphylaxis    Cefaclor Rash    Ciprofloxacin Rash    Biaxin [clarithromycin] Other (See Comments)     GI symptoms     Erythromycin      Unknown    Azithromycin Rash    Penicillins Rash    Tetracyclines Hives        Past Medical History:   Diagnosis Date    Allergy     previously on immunotherapy    Anxiety     Bleeding disorder     Depression     Facial trauma     Food allergy     GERD (gastroesophageal reflux disease)     Headache     Lumbago with sciatica, right side     Lumbar disc disease     L4-L5    Migraine headache     Osteoarthritis     Other chronic pain     Panic disorder     Positive CHELSEA (antinuclear antibody)     Purpura     Seasonal allergies     Sjogren's syndrome     Vasculitis      Past Surgical History:   Procedure Laterality Date    ADENOIDECTOMY  1995    CYST REMOVAL  2012    mandibular    HYSTEROSCOPY N/A 07/17/2020    Procedure: HYSTEROSCOPY/ with IUD removal;  Surgeon: Cherry Christine MD;  Location: [facility];  Service: OB/GYN;  Laterality: N/A;    INTRAUTERINE DEVICE INSERTION  07/17/2020    Procedure: INSERTION, INTRAUTERINE DEVICE;  Surgeon: Cherry Christine MD;  Location: [facility];  Service: OB/GYN;;    lumbar spine procedure      2004/2014    REMOVAL OF INTRAUTERINE DEVICE (IUD)  07/17/2020    Procedure: REMOVAL, INTRAUTERINE DEVICE;  Surgeon: Cherry Christine MD;  Location: [facility];  Service: OB/GYN;;    TONSILLECTOMY  1995    TONSILLECTOMY AND ADENOIDECTOMY      7th grade    turbonate reduction Bilateral 12/06/2023     Family History   Problem  Relation Name Age of Onset    Multiple sclerosis Mother Kelley Maynor     Heart disease Mother Kelley Maynor     Diabetes Mother Kelley Maynor     Hypertension Mother Kelley Maynor     Heart failure Mother Kelley Maynor     Migraines Mother Kelley Maynor     Irritable bowel syndrome Mother Kelley Maynor     Heart disease Father Gene Maynor     Hyperlipidemia Father Gene Maynor     Heart failure Father Gene Maynor     Migraines Sister Phyllis Ramos     Glaucoma Neg Hx      Retinal detachment Neg Hx      Macular degeneration Neg Hx      Colon cancer Neg Hx      Crohn's disease Neg Hx      Ulcerative colitis Neg Hx      Stomach cancer Neg Hx      Esophageal cancer Neg Hx       Social History[1]      OBJECTIVE:     Vital Signs (Most Recent)  Temp: 97.2 °F (36.2 °C) (05/20/25 1115)  Pulse: 81 (05/20/25 1115)  BP: 113/71 (05/20/25 1115)  SpO2: 100 % (05/20/25 1115)    Physical Exam:                                                       GENERAL:  Comfortable, in no acute distress.                                 HEENT EXAM:  Nonicteric.  No adenopathy.  Oropharynx is clear.               NECK:  Supple.                                                               LUNGS:  Clear.                                                               CARDIAC:  Regular rate and rhythm.  S1, S2.  No murmur.                      ABDOMEN:  Soft, positive bowel sounds, nontender.  No hepatosplenomegaly or masses.  No rebound or guarding.                                             EXTREMITIES:  No edema.     MENTAL STATUS:  Normal, alert and oriented.      ASSESSMENT/PLAN:     Assessment: Dysphagia and Reflux    Plan: EGD    Anesthesia Plan: General    ASA Grade: ASA 2 - Patient with mild systemic disease with no functional limitations    MALLAMPATI SCORE:  I (soft palate, uvula, fauces, and tonsillar pillars visible)           [1]   Social History  Tobacco Use    Smoking status: Former     Passive exposure: Current     Smokeless tobacco: Never   Substance Use Topics    Alcohol use: Yes     Alcohol/week: 2.0 standard drinks of alcohol     Types: 2 Drinks containing 0.5 oz of alcohol per week     Comment: socially    Drug use: Never

## 2025-05-20 NOTE — ANESTHESIA PREPROCEDURE EVALUATION
05/20/2025  Shamika Young is a 44 y.o., female.      Pre-op Assessment    I have reviewed the Patient Summary Reports.     I have reviewed the Nursing Notes. I have reviewed the NPO Status.   I have reviewed the Medications.     Review of Systems  Anesthesia Hx:  No problems with previous Anesthesia                Social:  Former Smoker       Hematology/Oncology:                   Hematology Comments: Vasculitis  Purpura  Positive CHELSEA                      Cardiovascular:  Cardiovascular Normal                                              Pulmonary:  Pulmonary Normal                       Renal/:  Renal/ Normal                 Hepatic/GI:     GERD                Musculoskeletal:  Arthritis          Spine Disorders: lumbar Disc disease, Degenerative disease and Chronic Pain           Neurological:    Neuromuscular Disease,  Headaches     Sjogren's syndrome                              Endocrine:  Endocrine Normal            Psych:  Psychiatric History anxiety (panic disorder) depression              Physical Exam  General: Well nourished, Cooperative, Alert and Oriented    Airway:  Mallampati: II   Mouth Opening: Normal  TM Distance: Normal  Neck ROM: Normal ROM    Anesthesia Plan  Type of Anesthesia, risks & benefits discussed:    Anesthesia Type: Gen ETT, Gen Supraglottic Airway, Gen Natural Airway, MAC  Intra-op Monitoring Plan: Standard ASA Monitors  Post Op Pain Control Plan: multimodal analgesia  Induction:  IV  Airway Plan: Direct, Video and Fiberoptic, Post-Induction  Informed Consent: Informed consent signed with the Patient and all parties understand the risks and agree with anesthesia plan.  All questions answered.   ASA Score: 3    Ready For Surgery From Anesthesia Perspective.   .

## 2025-05-20 NOTE — TRANSFER OF CARE
"Anesthesia Transfer of Care Note    Patient: Shamika Young    Procedure(s) Performed: Procedure(s) (LRB):  EGD (ESOPHAGOGASTRODUODENOSCOPY) (N/A)    Patient location: PACU    Anesthesia Type: general    Transport from OR: Transported from OR on room air with adequate spontaneous ventilation    Post pain: adequate analgesia    Post assessment: no apparent anesthetic complications    Post vital signs: stable    Level of consciousness: awake and sedated    Nausea/Vomiting: no nausea/vomiting    Complications: none    Transfer of care protocol was followed      Last vitals: Visit Vitals  /71   Pulse 81   Temp 36.2 °C (97.2 °F) (Temporal)   Ht 5' 6" (1.676 m)   Wt 59.4 kg (131 lb)   LMP 05/02/2025 (Exact Date)   SpO2 100%   Breastfeeding No   BMI 21.14 kg/m²     "

## 2025-05-21 ENCOUNTER — PATIENT MESSAGE (OUTPATIENT)
Dept: GASTROENTEROLOGY | Facility: CLINIC | Age: 45
End: 2025-05-21
Payer: COMMERCIAL

## 2025-05-22 LAB
ESTROGEN SERPL-MCNC: NORMAL PG/ML
INSULIN SERPL-ACNC: NORMAL U[IU]/ML
LAB AP CLINICAL INFORMATION: NORMAL
LAB AP GROSS DESCRIPTION: NORMAL
LAB AP PERFORMING LOCATION(S): NORMAL
LAB AP REPORT FOOTNOTES: NORMAL

## 2025-05-22 NOTE — TELEPHONE ENCOUNTER
"Tell pt I spoke with Anesthesiologist (Dr Black), does not feel symptoms related to anesthesia. Pt was not "restrained" during procedure. Suggest pt f/u with her Rheumatologist if symptoms persist/progress.  "

## 2025-05-30 ENCOUNTER — PATIENT MESSAGE (OUTPATIENT)
Dept: FAMILY MEDICINE | Facility: CLINIC | Age: 45
End: 2025-05-30
Payer: COMMERCIAL

## 2025-06-02 ENCOUNTER — INFUSION (OUTPATIENT)
Dept: INFUSION THERAPY | Facility: HOSPITAL | Age: 45
End: 2025-06-02
Attending: INTERNAL MEDICINE
Payer: COMMERCIAL

## 2025-06-02 ENCOUNTER — OFFICE VISIT (OUTPATIENT)
Dept: URGENT CARE | Facility: CLINIC | Age: 45
End: 2025-06-02
Payer: COMMERCIAL

## 2025-06-02 VITALS
DIASTOLIC BLOOD PRESSURE: 68 MMHG | RESPIRATION RATE: 18 BRPM | SYSTOLIC BLOOD PRESSURE: 103 MMHG | OXYGEN SATURATION: 99 % | WEIGHT: 129.88 LBS | HEIGHT: 66 IN | TEMPERATURE: 98 F | HEART RATE: 87 BPM | BODY MASS INDEX: 20.87 KG/M2

## 2025-06-02 VITALS
HEART RATE: 86 BPM | TEMPERATURE: 98 F | OXYGEN SATURATION: 99 % | DIASTOLIC BLOOD PRESSURE: 73 MMHG | RESPIRATION RATE: 16 BRPM | SYSTOLIC BLOOD PRESSURE: 120 MMHG

## 2025-06-02 DIAGNOSIS — M79.645 PAIN OF LEFT THUMB: Primary | ICD-10-CM

## 2025-06-02 DIAGNOSIS — D89.1 CRYOGLOBULINEMIC VASCULITIS: Primary | ICD-10-CM

## 2025-06-02 PROCEDURE — 96415 CHEMO IV INFUSION ADDL HR: CPT | Mod: PN

## 2025-06-02 PROCEDURE — 99214 OFFICE O/P EST MOD 30 MIN: CPT | Mod: S$GLB,,, | Performed by: PHYSICIAN ASSISTANT

## 2025-06-02 PROCEDURE — 96413 CHEMO IV INFUSION 1 HR: CPT | Mod: PN

## 2025-06-02 PROCEDURE — 96367 TX/PROPH/DG ADDL SEQ IV INF: CPT | Mod: PN

## 2025-06-02 PROCEDURE — 25000003 PHARM REV CODE 250: Mod: PN | Performed by: INTERNAL MEDICINE

## 2025-06-02 PROCEDURE — 63600175 PHARM REV CODE 636 W HCPCS: Mod: PN | Performed by: INTERNAL MEDICINE

## 2025-06-02 PROCEDURE — 96375 TX/PRO/DX INJ NEW DRUG ADDON: CPT | Mod: PN

## 2025-06-02 PROCEDURE — 73130 X-RAY EXAM OF HAND: CPT | Mod: LT,S$GLB,, | Performed by: RADIOLOGY

## 2025-06-02 RX ORDER — DIPHENHYDRAMINE HYDROCHLORIDE 50 MG/ML
50 INJECTION, SOLUTION INTRAMUSCULAR; INTRAVENOUS ONCE AS NEEDED
OUTPATIENT
Start: 2025-06-02

## 2025-06-02 RX ORDER — HEPARIN 100 UNIT/ML
500 SYRINGE INTRAVENOUS
OUTPATIENT
Start: 2025-06-02

## 2025-06-02 RX ORDER — METHYLPREDNISOLONE SOD SUCC 125 MG
100 VIAL (EA) INJECTION
Status: CANCELLED | OUTPATIENT
Start: 2025-06-02

## 2025-06-02 RX ORDER — ACETAMINOPHEN 325 MG/1
650 TABLET ORAL
Status: CANCELLED | OUTPATIENT
Start: 2025-06-02

## 2025-06-02 RX ORDER — FAMOTIDINE 10 MG/ML
20 INJECTION, SOLUTION INTRAVENOUS
Status: COMPLETED | OUTPATIENT
Start: 2025-06-02 | End: 2025-06-02

## 2025-06-02 RX ORDER — SODIUM CHLORIDE 0.9 % (FLUSH) 0.9 %
10 SYRINGE (ML) INJECTION
Status: DISCONTINUED | OUTPATIENT
Start: 2025-06-02 | End: 2025-06-02 | Stop reason: HOSPADM

## 2025-06-02 RX ORDER — FAMOTIDINE 10 MG/ML
20 INJECTION, SOLUTION INTRAVENOUS
Status: CANCELLED | OUTPATIENT
Start: 2025-06-02

## 2025-06-02 RX ORDER — SODIUM CHLORIDE 0.9 % (FLUSH) 0.9 %
10 SYRINGE (ML) INJECTION
OUTPATIENT
Start: 2025-06-02

## 2025-06-02 RX ORDER — EPINEPHRINE 0.3 MG/.3ML
0.3 INJECTION SUBCUTANEOUS ONCE AS NEEDED
Status: DISCONTINUED | OUTPATIENT
Start: 2025-06-02 | End: 2025-06-02 | Stop reason: HOSPADM

## 2025-06-02 RX ORDER — ACETAMINOPHEN 325 MG/1
650 TABLET ORAL
Status: COMPLETED | OUTPATIENT
Start: 2025-06-02 | End: 2025-06-02

## 2025-06-02 RX ORDER — MEPERIDINE HYDROCHLORIDE 50 MG/ML
25 INJECTION INTRAMUSCULAR; INTRAVENOUS; SUBCUTANEOUS
OUTPATIENT
Start: 2025-06-02 | End: 2025-06-03

## 2025-06-02 RX ORDER — METHYLPREDNISOLONE SOD SUCC 125 MG
80 VIAL (EA) INJECTION
Status: COMPLETED | OUTPATIENT
Start: 2025-06-02 | End: 2025-06-02

## 2025-06-02 RX ORDER — METHYLPREDNISOLONE SOD SUCC 125 MG
100 VIAL (EA) INJECTION
Status: DISCONTINUED | OUTPATIENT
Start: 2025-06-02 | End: 2025-06-02

## 2025-06-02 RX ORDER — DIPHENHYDRAMINE HYDROCHLORIDE 50 MG/ML
50 INJECTION, SOLUTION INTRAMUSCULAR; INTRAVENOUS ONCE AS NEEDED
Status: DISCONTINUED | OUTPATIENT
Start: 2025-06-02 | End: 2025-06-02 | Stop reason: HOSPADM

## 2025-06-02 RX ORDER — METHYLPREDNISOLONE SOD SUCC 125 MG
80 VIAL (EA) INJECTION ONCE
OUTPATIENT
Start: 2025-06-02

## 2025-06-02 RX ORDER — EPINEPHRINE 0.3 MG/.3ML
0.3 INJECTION SUBCUTANEOUS ONCE AS NEEDED
OUTPATIENT
Start: 2025-06-02

## 2025-06-02 RX ADMIN — ACETAMINOPHEN 650 MG: 325 TABLET ORAL at 09:06

## 2025-06-02 RX ADMIN — METHYLPREDNISOLONE SODIUM SUCCINATE 80 MG: 125 INJECTION, POWDER, FOR SOLUTION INTRAMUSCULAR; INTRAVENOUS at 11:06

## 2025-06-02 RX ADMIN — HYDROCORTISONE SODIUM SUCCINATE 100 MG: 100 INJECTION, POWDER, FOR SOLUTION INTRAMUSCULAR; INTRAVENOUS at 10:06

## 2025-06-02 RX ADMIN — SODIUM CHLORIDE 1000 MG: 9 INJECTION, SOLUTION INTRAVENOUS at 11:06

## 2025-06-02 RX ADMIN — SODIUM CHLORIDE: 9 INJECTION, SOLUTION INTRAVENOUS at 09:06

## 2025-06-02 RX ADMIN — DIPHENHYDRAMINE HYDROCHLORIDE 25 MG: 50 INJECTION INTRAMUSCULAR; INTRAVENOUS at 09:06

## 2025-06-02 RX ADMIN — FAMOTIDINE 20 MG: 10 INJECTION INTRAVENOUS at 10:06

## 2025-06-02 NOTE — TELEPHONE ENCOUNTER
I was going to schedule an appointment. She says it was discussed during visit on 4/24/25. Please let me know if you want her to come in    PAST SURGICAL HISTORY:  History of cholecystectomy

## 2025-06-09 ENCOUNTER — ON-DEMAND VIRTUAL (OUTPATIENT)
Dept: URGENT CARE | Facility: CLINIC | Age: 45
End: 2025-06-09
Payer: COMMERCIAL

## 2025-06-09 DIAGNOSIS — R19.7 DIARRHEA, UNSPECIFIED TYPE: Primary | ICD-10-CM

## 2025-06-09 RX ORDER — DICYCLOMINE HYDROCHLORIDE 20 MG/1
20 TABLET ORAL EVERY 6 HOURS
Qty: 30 TABLET | Refills: 0 | Status: SHIPPED | OUTPATIENT
Start: 2025-06-09

## 2025-06-09 NOTE — PROGRESS NOTES
Subjective:      Patient ID: Shamika Young is a 44 y.o. female.    Vitals:  vitals were not taken for this visit.     Chief Complaint: Diarrhea      Visit Type: TELE AUDIOVISUAL - This visit was conducted virtually based on  subjective information and limited objective exam    Present with the patient at the time of consultation: TELEMED PRESENT WITH PATIENT: None  LOCATION OF PATIENT [place]  Two patient identifiers used to verify patient- saying out date of birth and full name.       Past Medical History:   Diagnosis Date    Allergy     previously on immunotherapy    Anxiety     Bleeding disorder     Depression     Facial trauma     Food allergy     GERD (gastroesophageal reflux disease)     Headache     Lumbago with sciatica, right side     Lumbar disc disease     L4-L5    Migraine headache     Osteoarthritis     Other chronic pain     Panic disorder     Positive CHELSEA (antinuclear antibody)     Purpura     Seasonal allergies     Sjogren's syndrome     Vasculitis      Past Surgical History:   Procedure Laterality Date    ADENOIDECTOMY  1995    CYST REMOVAL  2012    mandibular    ESOPHAGOGASTRODUODENOSCOPY N/A 5/20/2025    Procedure: EGD (ESOPHAGOGASTRODUODENOSCOPY);  Surgeon: Terrence Jones MD;  Location: [facility];  Service: Gastroenterology;  Laterality: N/A;    HYSTEROSCOPY N/A 07/17/2020    Procedure: HYSTEROSCOPY/ with IUD removal;  Surgeon: Cherry Christine MD;  Location: [facility];  Service: OB/GYN;  Laterality: N/A;    INTRAUTERINE DEVICE INSERTION  07/17/2020    Procedure: INSERTION, INTRAUTERINE DEVICE;  Surgeon: Cherry Christine MD;  Location: [facility];  Service: OB/GYN;;    lumbar spine procedure      2004/2014    REMOVAL OF INTRAUTERINE DEVICE (IUD)  07/17/2020    Procedure: REMOVAL, INTRAUTERINE DEVICE;  Surgeon: Cherry Christine MD;  Location: [facility];  Service: OB/GYN;;    TONSILLECTOMY  1995    TONSILLECTOMY AND ADENOIDECTOMY      7th grade    turbonate reduction Bilateral 12/06/2023      Review of patient's allergies indicates:   Allergen Reactions    Pecan nut Anaphylaxis    Tree nut Anaphylaxis    Tree nuts Anaphylaxis    Harvel Anaphylaxis    Cefaclor Rash    Ciprofloxacin Rash    Biaxin [clarithromycin] Other (See Comments)     GI symptoms     Erythromycin      Unknown    Azithromycin Rash    Penicillins Rash    Tetracyclines Hives     Medications Ordered Prior to Encounter[1]  Family History   Problem Relation Name Age of Onset    Multiple sclerosis Mother Kelley Maynor     Heart disease Mother Kelley Maynor     Diabetes Mother Kelley Maynor     Hypertension Mother Kelley Maynor     Heart failure Mother Kelley Maynor     Migraines Mother Kelley Maynor     Irritable bowel syndrome Mother Kelley Maynor     Heart disease Father Gene Maynor     Hyperlipidemia Father Gene Maynor     Heart failure Father Gene Maynor     Migraines Sister Phyllis Ramos     Glaucoma Neg Hx      Retinal detachment Neg Hx      Macular degeneration Neg Hx      Colon cancer Neg Hx      Crohn's disease Neg Hx      Ulcerative colitis Neg Hx      Stomach cancer Neg Hx      Esophageal cancer Neg Hx         Medications Ordered                [address] [address]   [address] [facility] [address]    Telephone: [phone]   Fax: [phone]   Hours: Not open 24 hours                         E-Prescribed (1 of 1)              dicyclomine (BENTYL) 20 mg tablet    Sig: Take 1 tablet (20 mg total) by mouth every 6 (six) hours.       Start: 6/9/25     Quantity: 30 tablet Refills: 0                           No questionnaires on file.    43 yo female with c/o diarrhea. She states several days ago went 5-6 times. She denies nausea or vomting. She states she can not keep down food. She denies abdominal pain. She states her son also had similar symptoms.         Constitution: Negative.   HENT: Negative.     Cardiovascular: Negative.    Respiratory:  Negative.     Gastrointestinal:  Positive for nausea, vomiting and diarrhea.   Endocrine: negative.   Genitourinary: Negative.  Negative for frequency and urgency.   Musculoskeletal: Negative.    Skin: Negative.    Allergic/Immunologic: Negative.    Neurological: Negative.    Hematologic/Lymphatic: Negative.    Psychiatric/Behavioral: Negative.          Objective:   The physical exam was conducted virtually.    AAO x 3 ; no acute distress noted; appearance normal; mood and behavior normal; thought process normal  Head- normocephalic  Nose- appears normal, no discharge or erythema  Eyes- pupils appear normal in size, no drainage, no erythema  Ears- normal appearing; no discharge, no erythema  Mouth- appears normal  Oropharynx- no erythema, lesions  Lungs- breathing at a normal rate, no acute distress noted  Heart- no reports of tachycardia, palpitations, chest pain  Abdomen- non distended, non tender reported by patient  Skin- warm and dry, no erythema or edema noted by patient or visualized  Psych- as above; no si/hi      Assessment:     1. Diarrhea, unspecified type        Plan:     Low fiber diet  Bentyl for spasms      Thank you for choosing [facility]!    Our goal in the [facility] is to always provide outstanding medical care. You may receive a survey by mail or e-mail in the next week regarding your experience today. We would greatly appreciate you completing and returning the survey. Your feedback provides us with a way to recognize our staff who provide very good care, and it helps us learn how to improve when your experience was below our aspiration of excellence.         We appreciate you trusting us with your medical care. We hope you feel better soon. We will be happy to take care of you for all of your future medical needs.    You must understand that you've received an Urgent Care treatment only and that you may be released before all your medical problems are  known or treated. You, the patient, will arrange for follow up care as instructed.    Follow up with your PCP or specialty clinic as directed in the next 1-2 weeks if not improved or as needed.  You can call [phone] to schedule an appointment with the appropriate provider.    If your condition worsens we recommend that you receive another evaluation in person, with your primary care provider, urgent care or at the emergency room immediately or contact your primary medical clinics after hours call service to discuss your concerns.         Diarrhea, unspecified type  -     dicyclomine (BENTYL) 20 mg tablet; Take 1 tablet (20 mg total) by mouth every 6 (six) hours.  Dispense: 30 tablet; Refill: 0                         [1]   Current Outpatient Medications on File Prior to Visit   Medication Sig Dispense Refill    ALPRAZolam (XANAX) 0.5 MG tablet Take 0.5 mg by mouth daily as needed.      azelastine (ASTELIN) 137 mcg (0.1 %) nasal spray 1 spray (137 mcg total) by Nasal route 2 (two) times daily. 30 mL 0    buPROPion (WELLBUTRIN) 75 MG tablet Take 75 mg by mouth once daily.      bupropion HBr (APLENZIN ORAL)       busPIRone (BUSPAR) 10 MG tablet Take 10 mg by mouth 3 (three) times daily.      cevimeline (EVOXAC) 30 mg capsule TAKE 1 CAPSULE BY MOUTH THREE TIMES DAILY 90 capsule 0    cevimeline (EVOXAC) 30 mg capsule Take 1 capsule (30 mg total) by mouth 3 (three) times daily. 90 capsule 11    EPINEPHrine (EPIPEN 2-CA) 0.3 mg/0.3 mL AtIn Inject 0.6 mLs (0.6 mg total) into the muscle once. for 1 dose (Patient not taking: Reported on 4/24/2025) 0.6 mL 0    ergocalciferol, vitamin D2, (VITAMIN D ORAL) Take by mouth.      estradioL (ESTRACE) 0.01 % (0.1 mg/gram) vaginal cream Place vaginally.      famotidine (PEPCID) 20 MG tablet Take 1 tablet (20 mg total) by mouth 2 (two) times daily. 60 tablet 2    hydroxychloroquine (PLAQUENIL) 200 mg tablet Take 1 tablet (200 mg total) by mouth 2 (two) times a day. 60 tablet 11     LYLLANA 0.05 mg/24 hr 1 patch twice a week.      methylphenidate HCl (RITALIN) 20 MG tablet Take 20 mg by mouth once daily.      montelukast (SINGULAIR) 10 mg tablet TAKE 1 TABLET(10 MG) BY MOUTH EVERY EVENING 90 tablet 3    pantoprazole (PROTONIX) 40 MG tablet Take 1 tablet (40 mg total) by mouth once daily. 30 tablet 2    predniSONE (DELTASONE) 5 MG tablet Take 1-4 tablets (5-20 mg total) by mouth daily as needed (pain). 75 tablet 3    progesterone (PROMETRIUM) 100 MG capsule Take 1 capsule every day by oral route at bedtime for 30 days.      propranoloL (INDERAL LA) 60 MG 24 hr capsule Take 60 mg by mouth.      rimegepant (NURTEC) 75 mg odt Dissolve 1 tablet by mouth every other day for migraine prevention 16 tablet 11    tirzepatide 2.5 mg/0.5 mL PnIj Inject 2.5 mg into the skin every 7 days.      traMADoL (ULTRAM) 50 mg tablet Take 1 tablet (50 mg total) by mouth every 12 (twelve) hours as needed for Pain (joint pains dx Primary Sjogrens with vasculitis.). 40 tablet 3    VYVANSE 30 mg capsule Take 30 mg by mouth nightly.       No current facility-administered medications on file prior to visit.

## 2025-07-09 ENCOUNTER — LAB VISIT (OUTPATIENT)
Dept: LAB | Facility: HOSPITAL | Age: 45
End: 2025-07-09
Attending: INTERNAL MEDICINE
Payer: COMMERCIAL

## 2025-07-09 DIAGNOSIS — D89.1 CRYOGLOBULINEMIC VASCULITIS: ICD-10-CM

## 2025-07-09 DIAGNOSIS — M06.4 INFLAMMATORY POLYARTHRITIS: ICD-10-CM

## 2025-07-09 DIAGNOSIS — M35.00 SJOGREN'S SYNDROME WITHOUT EXTRAGLANDULAR INVOLVEMENT: ICD-10-CM

## 2025-07-09 LAB
ABSOLUTE EOSINOPHIL (OHS): 0.17 K/UL
ABSOLUTE MONOCYTE (OHS): 0.55 K/UL (ref 0.3–1)
ABSOLUTE NEUTROPHIL COUNT (OHS): 3.74 K/UL (ref 1.8–7.7)
ALT SERPL W/O P-5'-P-CCNC: 12 UNIT/L (ref 10–44)
AST SERPL-CCNC: 15 UNIT/L (ref 11–45)
BASOPHILS # BLD AUTO: 0.03 K/UL
BASOPHILS NFR BLD AUTO: 0.5 %
CREAT SERPL-MCNC: 0.8 MG/DL (ref 0.5–1.4)
CRP SERPL-MCNC: 7 MG/L
ERYTHROCYTE [DISTWIDTH] IN BLOOD BY AUTOMATED COUNT: 12.6 % (ref 11.5–14.5)
ERYTHROCYTE [SEDIMENTATION RATE] IN BLOOD BY PHOTOMETRIC METHOD: 42 MM/HR
GFR SERPLBLD CREATININE-BSD FMLA CKD-EPI: >60 ML/MIN/1.73/M2
HCT VFR BLD AUTO: 39.6 % (ref 37–48.5)
HGB BLD-MCNC: 12.9 GM/DL (ref 12–16)
IMM GRANULOCYTES # BLD AUTO: 0.02 K/UL (ref 0–0.04)
IMM GRANULOCYTES NFR BLD AUTO: 0.3 % (ref 0–0.5)
LYMPHOCYTES # BLD AUTO: 1.3 K/UL (ref 1–4.8)
MCH RBC QN AUTO: 30.7 PG (ref 27–31)
MCHC RBC AUTO-ENTMCNC: 32.6 G/DL (ref 32–36)
MCV RBC AUTO: 94 FL (ref 82–98)
NUCLEATED RBC (/100WBC) (OHS): 0 /100 WBC
PLATELET # BLD AUTO: 353 K/UL (ref 150–450)
PMV BLD AUTO: 11.4 FL (ref 9.2–12.9)
RBC # BLD AUTO: 4.2 M/UL (ref 4–5.4)
RELATIVE EOSINOPHIL (OHS): 2.9 %
RELATIVE LYMPHOCYTE (OHS): 22.4 % (ref 18–48)
RELATIVE MONOCYTE (OHS): 9.5 % (ref 4–15)
RELATIVE NEUTROPHIL (OHS): 64.4 % (ref 38–73)
WBC # BLD AUTO: 5.81 K/UL (ref 3.9–12.7)

## 2025-07-09 PROCEDURE — 84460 ALANINE AMINO (ALT) (SGPT): CPT

## 2025-07-09 PROCEDURE — 36415 COLL VENOUS BLD VENIPUNCTURE: CPT | Mod: PO

## 2025-07-09 PROCEDURE — 86140 C-REACTIVE PROTEIN: CPT

## 2025-07-09 PROCEDURE — 82595 ASSAY OF CRYOGLOBULIN: CPT

## 2025-07-09 PROCEDURE — 85025 COMPLETE CBC W/AUTO DIFF WBC: CPT

## 2025-07-09 PROCEDURE — 82565 ASSAY OF CREATININE: CPT

## 2025-07-09 PROCEDURE — 84450 TRANSFERASE (AST) (SGOT): CPT

## 2025-07-09 PROCEDURE — 85652 RBC SED RATE AUTOMATED: CPT

## 2025-07-14 ENCOUNTER — PATIENT MESSAGE (OUTPATIENT)
Dept: RHEUMATOLOGY | Facility: CLINIC | Age: 45
End: 2025-07-14
Payer: COMMERCIAL

## 2025-07-15 ENCOUNTER — OFFICE VISIT (OUTPATIENT)
Dept: RHEUMATOLOGY | Facility: CLINIC | Age: 45
End: 2025-07-15
Payer: COMMERCIAL

## 2025-07-15 DIAGNOSIS — M06.4 INFLAMMATORY POLYARTHRITIS: ICD-10-CM

## 2025-07-15 DIAGNOSIS — Z79.899 HIGH RISK MEDICATIONS (NOT ANTICOAGULANTS) LONG-TERM USE: ICD-10-CM

## 2025-07-15 DIAGNOSIS — M35.00 SJOGREN'S SYNDROME WITHOUT EXTRAGLANDULAR INVOLVEMENT: ICD-10-CM

## 2025-07-15 DIAGNOSIS — D89.1 CRYOGLOBULINEMIC VASCULITIS: Primary | ICD-10-CM

## 2025-07-15 DIAGNOSIS — D84.9 IMMUNOSUPPRESSION: ICD-10-CM

## 2025-07-15 PROCEDURE — 98007 SYNCH AUDIO-VIDEO EST HI 40: CPT | Mod: 95,,, | Performed by: INTERNAL MEDICINE

## 2025-07-15 NOTE — Clinical Note
F/u in 4 months She will have ESR crp done at [facility] in a few weeks Regular labs cbc, sed, crp, cryos, cmp to be done at [facility] prior to next visit.

## 2025-07-15 NOTE — PROGRESS NOTES
Subjective:          Chief Complaint: Shamika Young is a 44 y.o. female who had no chief complaint listed for this encounter.    HPI: Primary Sjogren's w/ cryoglobulinemic vasculitis.  (++RF, +SSA, +SSB, +CHELSEA)     Continues on    Rituxan periodically based on Cryos, symptoms and lab markers.  Last infusion   HCQ 200mg BID- last eye 2021.  Prednisone 5mg- only uses in pulse tapers (20-30mg few days)      Interval events:      7/2025: Cryos still pending. Just completed Rituxan overall feels ok at this time. Legs w/o much petechia/rash. ESR was elevated 42mm/hr with normal CRP.   She is having wandering oligoarthralgia, but more appreciating swelling at various locations, then resolves and new spot starts.  Photo of swelling in volar wrist. Typically they resolve in in 24 hours. She had a similar pattern in her feet but more diffuse. We dicussed this in an email as she felt some were minor injuries that resulted in significant pain.   Not a clear daily pattern of inflammatory arthritis with daily AM stiffness more intermittent.    Patient continues to tolentino dry mouth and difficulty with swallowing. EGD clear.  This affects her ability to work as it requires talking all day.   She is having success with Rituxan, but does require accommodations for work which is working for now.     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       9/2024: patient doing well with remote work accommodation, temperature restrictions if daily temp drops below 50 degrees she does not have to go in.   She is overall doing well since restart RTX, few purpura but far less, some hives. She still develops some as she stands for longer. No CP, no SOB.   She did have COVID recently, but recovering.       5/2024: With a history of cryoglobulinemic vasculitis significant purpuriathat started this winter 2024  "and progressed such that we restarted her rituximab receiving her 1st infusion 04/15/2024 and her 2nd on 04/29/2024. More recently,  she is having some brusing about the heal and dorsum fo the foot with swelling that is painful x few days.  almost like stone bruises that were occurring after walking that she was curious if this was related. she is here today to discuss this. No hx of similar pain, no pitting, +pain.     Labs from 04/29/2024 do show a sed rate of 47 consistent with her active vasculitis.  She has no anemia at this time in her C-reactive protein was within normal limits.  Liver and kidney are within normal limits.    3/15/2024 she did have cryoglobulin qualitative showed absent did not trigger the quantitative portion of the test.  Patient has included various images which I have reviewed.  1/2024: doing rather well considering winter weather. Some mottlling with red spots at times, no petechia that persist. No open sores. Renal function WNL.   Cryos 11/2023 were trace. Needs HCQ eye exam. Prefers evoxac to salagen. Working with [facility] avoiding triptans       9/2023: Patient doing "ok" having fatigue. Noting urticaria and petechia none that opened. Seems to flare with stress and improved now lasted about 5 days.  Legs only.  Last RTX was 3/2023.   We elected to hold on RTX   But did start her on MTX- she started MTX at 10mg x 2 weeks with malaise, nausea for about 3 days felt she would never tolerate this.   She is noting dryness of her eyes and mouth: evoxac still help. Salagen triggered.          6/2023:   Doing better with warm weather.   Fatigue continues w/ stress. She flares with travel. Flares with viral infections.   Continues: with mottleing but no petechial rash.   No numbness or tingling.    She notes some rashes with dusky changes at the knee exacerbates with stress.     Patient has been doing well overall since last RTX (Ruxience) 8/2022.   Patient started in mid Jan with urticarial " rashes, livedo and petechia in bilateral LE. We elected to repeat Ruxience (RTX)- Last Ruxience (RTX) 3/13/2023  Pins in needles in forearm but this appears to be positional. Trying to adjust sleep and desk  + Fatigue as well.   Raynauds always worse in winter.   No SOB/LÓPEZ no hemoptysis.     Component      Latest Ref Rng & Units 5/8/2023 2/8/2023 2/8/2023 4/25/2022           12:06 PM 12:06 PM    Anti Sm Antibody      0.00 - 0.99 Ratio  0.12     Anti-Sm Interpretation      Negative  Negative     Anti-SSA Antibody      0.00 - 0.99 Ratio  3.94 (H) 3.94 (H)    Anti-SSA Interpretation      Negative  Positive (A) Positive (A)    Anti-SSB Antibody      0.00 - 0.99 Ratio  3.14 (H) 3.14 (H)    Anti-SSB Interpretation      Negative  Positive (A) Positive (A)    ds DNA Ab      Negative 1:10  Negative 1:10     Anti Sm/RNP Antibody      0.00 - 0.99 Ratio  0.22     Anti-Sm/RNP Interpretation      Negative  Negative     Rheumatoid Factor      0.0 - 15.0 IU/mL       Cryoglobulin, Qual      Absent   Absent Absent   CHELSEA Screen      Negative <1:80   Positive (A)    CHELSEA PATTERN 1         Speckled    CHELSEA Titer 1         1:640    Sed Rate      0 - 36 mm/Hr 5      CRP      0.0 - 8.2 mg/L 1.0        Component      Latest Ref Rng & Units 3/3/2020             Anti Sm Antibody      0.00 - 0.99 Ratio    Anti-Sm Interpretation      Negative    Anti-SSA Antibody      0.00 - 0.99 Ratio 4.67 (H)   Anti-SSA Interpretation      Negative Positive (A)   Anti-SSB Antibody      0.00 - 0.99 Ratio 2.40 (H)   Anti-SSB Interpretation      Negative Positive (A)   ds DNA Ab      Negative 1:10    Anti Sm/RNP Antibody      0.00 - 0.99 Ratio    Anti-Sm/RNP Interpretation      Negative    Rheumatoid Factor      0.0 - 15.0 IU/mL 484.0 (H)   Cryoglobulin, Qual      Absent    CHELSEA Screen      Negative <1:80    CHELSEA PATTERN 1          CHELSEA Titer 1          Sed Rate      0 - 36 mm/Hr    CRP      0.0 - 8.2 mg/L        Rheum Hx:   Patient is a 39-year-old female being  referred by Dr. Chopra with hx of cryoglobulinemia vasculitis and Primary Sjogren's.    Patient seen at [facility]. Ultimately diagnosed [id number].  She states rash started during pregnancy of legs started with purpura of legs. She then developed more diffuse coalescing purpura but no open sores. No renal or respiratory disease to date.   Patient did have skin bx: LCV  Previously hypocomplementemic.   She did note fewer skin purpuric lesions after 2 cycles of RTX   She has noted more urticaria with wheal in the skin,  some reactions with food, wheat/rice seem to be worst. She notes no new soaps or detergents. Present since 2017.   She has seen allergist with some nut and environmental allergies no changes present since childhood. - working with DR. Curtis.   Dry eyes present but tolerates contacts.   Dry mouth more problematic.   Joints fluctuate between 6-9/10. Hand and wrist predominant.   No real Raynauds in triphasic pattern but cold hands and feet.   Patient c/o swelling of lips at times-no known food allergies.       She is noting more livedo and mottling in legs w/o activity, ++ fatigue increasing. She does note during flares some joint pains. 2-5 days every 2 weeks. Flares seem to be ocurring every 2 weeks.   She notes working from home has helped with overall fatigue/pain.   No numbness or tingling.     PFTs at [facility] were reportedly normal 7/2019 , no hemoptysis or SOB  Thinks she had CT chest.   No bone marrow bx.   No DVT, miscarriages, seizures or strokes.   No renal disease.        She is on hydroxychloroquine 200 mg twice daily.  Rituxan last dose 7/2019  cevimiline 30mg TID  Prednisone 10mg once daily PRN ( during flare daily rest is periodical.   Rituximab last dose 7/2019 (x 3 treatments 6 months apart. )     OTC:   Xylimelts.   Not using biotene.   sugarfree lemon  gylcerin lozenges.     Previous medications: Salagen with ASE with [facility].   No hx of hepatitis, no malignancy in self to date.    Component      Latest Ref Rng & Units 3/3/2020   Anti-SSA Antibody      0.00 - 0.99 Ratio 4.67 (H)   Anti-SSA Interpretation      Negative Positive (A)   Anti-SSB Antibody      0.00 - 0.99 Ratio 2.40 (H)   Anti-SSB Interpretation      Negative Positive (A)   Anti Sm Antibody      0.00 - 0.99 Ratio 0.07   Anti-Sm Interpretation      Negative Negative   Anti Sm/RNP Antibody      0.00 - 0.99 Ratio 0.17   Anti-Sm/RNP Interpretation      Negative Negative   Rheumatoid Factor      0.0 - 15.0 IU/mL 484.0 (H)   CCP Antibodies      <5.0 U/mL <0.5   CHELSEA Screen      Negative <1:80 Positive (A)   ds DNA Ab      Negative 1:10 Negative 1:10   CHELSEA HEP-2 Titer       Positive >=1:2560 Speckled         REVIEW OF SYSTEMS:    Review of Systems   Constitutional:  Negative for fever, malaise/fatigue and weight loss.   HENT:  Negative for sore throat.    Eyes:  Positive for pain and redness. Negative for double vision and photophobia.   Respiratory:  Negative for cough, shortness of breath and wheezing.    Cardiovascular:  Negative for chest pain, palpitations and orthopnea.   Gastrointestinal:  Negative for abdominal pain, constipation and diarrhea.   Genitourinary:  Negative for dysuria, hematuria and urgency.   Musculoskeletal:  Positive for joint pain. Negative for back pain and myalgias.   Skin:  Positive for rash (cryos with petechia one episode).   Neurological:  Positive for headaches. Negative for dizziness, tingling and focal weakness.   Endo/Heme/Allergies:  Does not bruise/bleed easily.   Psychiatric/Behavioral:  Negative for depression, hallucinations and suicidal ideas.                Objective:            Past Medical History:   Diagnosis Date    Allergy     previously on immunotherapy    Anxiety     Bleeding disorder     Depression     Facial trauma     Food allergy     GERD (gastroesophageal reflux disease)     Headache     Lumbago with sciatica, right side     Lumbar disc disease     L4-L5    Migraine headache      Osteoarthritis     Other chronic pain     Panic disorder     Positive CHELSEA (antinuclear antibody)     Purpura     Seasonal allergies     Sjogren's syndrome     Vasculitis      Family History   Problem Relation Name Age of Onset    Multiple sclerosis Mother Kelley Maynor     Heart disease Mother Kelley Mcguire     Diabetes Mother Kelley Mcguire     Hypertension Mother Kelley Maynor     Heart failure Mother Kelley Maynor     Migraines Mother Kelley Mcguire     Irritable bowel syndrome Mother Kelley Maynor     Heart disease Father Gene Maynor     Hyperlipidemia Father Gene Maynor     Heart failure Father Gene Maynor     Migraines Sister Phyllis Ramos     Glaucoma Neg Hx      Retinal detachment Neg Hx      Macular degeneration Neg Hx      Colon cancer Neg Hx      Crohn's disease Neg Hx      Ulcerative colitis Neg Hx      Stomach cancer Neg Hx      Esophageal cancer Neg Hx       Social History     Tobacco Use    Smoking status: Former     Passive exposure: Current    Smokeless tobacco: Never   Substance Use Topics    Alcohol use: Yes     Alcohol/week: 2.0 standard drinks of alcohol     Types: 2 Drinks containing 0.5 oz of alcohol per week     Comment: socially    Drug use: Never         Current Outpatient Medications on File Prior to Visit   Medication Sig Dispense Refill    ALPRAZolam (XANAX) 0.5 MG tablet Take 0.5 mg by mouth daily as needed.      azelastine (ASTELIN) 137 mcg (0.1 %) nasal spray 1 spray (137 mcg total) by Nasal route 2 (two) times daily. 30 mL 0    buPROPion (WELLBUTRIN) 75 MG tablet Take 75 mg by mouth once daily.      bupropion HBr (APLENZIN ORAL)       busPIRone (BUSPAR) 10 MG tablet Take 10 mg by mouth 3 (three) times daily.      cevimeline (EVOXAC) 30 mg capsule TAKE 1 CAPSULE BY MOUTH THREE TIMES DAILY 90 capsule 0    cevimeline (EVOXAC) 30 mg capsule Take 1 capsule (30 mg total) by mouth 3 (three) times daily. 90 capsule 11    dicyclomine (BENTYL) 20 mg tablet Take 1 tablet (20 mg  total) by mouth every 6 (six) hours. 30 tablet 0    EPINEPHrine (EPIPEN 2-CA) 0.3 mg/0.3 mL AtIn Inject 0.6 mLs (0.6 mg total) into the muscle once. for 1 dose (Patient not taking: Reported on 4/24/2025) 0.6 mL 0    ergocalciferol, vitamin D2, (VITAMIN D ORAL) Take by mouth.      estradioL (ESTRACE) 0.01 % (0.1 mg/gram) vaginal cream Place vaginally.      famotidine (PEPCID) 20 MG tablet Take 1 tablet (20 mg total) by mouth 2 (two) times daily. 60 tablet 2    hydroxychloroquine (PLAQUENIL) 200 mg tablet Take 1 tablet (200 mg total) by mouth 2 (two) times a day. 60 tablet 11    LYLLANA 0.05 mg/24 hr 1 patch twice a week.      methylphenidate HCl (RITALIN) 20 MG tablet Take 20 mg by mouth once daily.      montelukast (SINGULAIR) 10 mg tablet TAKE 1 TABLET(10 MG) BY MOUTH EVERY EVENING 90 tablet 3    pantoprazole (PROTONIX) 40 MG tablet Take 1 tablet (40 mg total) by mouth once daily. 30 tablet 2    predniSONE (DELTASONE) 5 MG tablet Take 1-4 tablets (5-20 mg total) by mouth daily as needed (pain). 75 tablet 3    progesterone (PROMETRIUM) 100 MG capsule Take 1 capsule every day by oral route at bedtime for 30 days.      propranoloL (INDERAL LA) 60 MG 24 hr capsule Take 60 mg by mouth.      rimegepant (NURTEC) 75 mg odt Dissolve 1 tablet by mouth every other day for migraine prevention 16 tablet 11    tirzepatide 2.5 mg/0.5 mL PnIj Inject 2.5 mg into the skin every 7 days.      traMADoL (ULTRAM) 50 mg tablet Take 1 tablet (50 mg total) by mouth every 12 (twelve) hours as needed for Pain (joint pains dx Primary Sjogrens with vasculitis.). 40 tablet 3    VYVANSE 30 mg capsule Take 30 mg by mouth nightly.       No current facility-administered medications on file prior to visit.       There were no vitals filed for this visit.        Physical Exam:    Physical Exam  Constitutional:       Appearance: She is well-developed.   Eyes:      General: Lids are normal.   Skin:     Comments: Legs with some mottling and livedo.     Neurological:      Mental Status: She is oriented to person, place, and time.   Psychiatric:         Behavior: Behavior normal.         Thought Content: Thought content normal.               Assessment:       Encounter Diagnoses   Name Primary?    Cryoglobulinemic vasculitis Yes    Sjogren's syndrome without extraglandular involvement     Inflammatory polyarthritis     Immunosuppression     High risk medications (not anticoagulants) long-term use              Plan:        Cryoglobulinemic vasculitis  -     Sedimentation rate; Future; Expected date: 07/15/2025  -     C-Reactive Protein; Future; Expected date: 07/15/2025  -     CBC Auto Differential; Future; Expected date: 07/15/2025  -     Comprehensive Metabolic Panel; Future; Expected date: 07/15/2025  -     Sedimentation rate; Future; Expected date: 07/15/2025  -     C-Reactive Protein; Future; Expected date: 07/15/2025  -     Cryoglobulin; Future; Expected date: 07/15/2025  -     Protein Electrophoresis, Serum; Future; Expected date: 07/15/2025  -     Immunofixation, Serum; Future; Expected date: 07/15/2025    Sjogren's syndrome without extraglandular involvement  -     Sedimentation rate; Future; Expected date: 07/15/2025  -     C-Reactive Protein; Future; Expected date: 07/15/2025  -     CBC Auto Differential; Future; Expected date: 07/15/2025  -     Comprehensive Metabolic Panel; Future; Expected date: 07/15/2025  -     Sedimentation rate; Future; Expected date: 07/15/2025  -     C-Reactive Protein; Future; Expected date: 07/15/2025  -     Cryoglobulin; Future; Expected date: 07/15/2025  -     Protein Electrophoresis, Serum; Future; Expected date: 07/15/2025  -     Immunofixation, Serum; Future; Expected date: 07/15/2025    Inflammatory polyarthritis  -     Sedimentation rate; Future; Expected date: 07/15/2025  -     C-Reactive Protein; Future; Expected date: 07/15/2025  -     CBC Auto Differential; Future; Expected date: 07/15/2025  -     Comprehensive  Metabolic Panel; Future; Expected date: 07/15/2025  -     Sedimentation rate; Future; Expected date: 07/15/2025  -     C-Reactive Protein; Future; Expected date: 07/15/2025  -     Cryoglobulin; Future; Expected date: 07/15/2025  -     Protein Electrophoresis, Serum; Future; Expected date: 07/15/2025  -     Immunofixation, Serum; Future; Expected date: 07/15/2025    Immunosuppression  -     CBC Auto Differential; Future; Expected date: 07/15/2025  -     Comprehensive Metabolic Panel; Future; Expected date: 07/15/2025  -     Sedimentation rate; Future; Expected date: 07/15/2025  -     C-Reactive Protein; Future; Expected date: 07/15/2025  -     Cryoglobulin; Future; Expected date: 07/15/2025  -     Protein Electrophoresis, Serum; Future; Expected date: 07/15/2025  -     Immunofixation, Serum; Future; Expected date: 07/15/2025    High risk medications (not anticoagulants) long-term use  -     CBC Auto Differential; Future; Expected date: 07/15/2025  -     Comprehensive Metabolic Panel; Future; Expected date: 07/15/2025  -     Sedimentation rate; Future; Expected date: 07/15/2025  -     C-Reactive Protein; Future; Expected date: 07/15/2025  -     Cryoglobulin; Future; Expected date: 07/15/2025  -     Protein Electrophoresis, Serum; Future; Expected date: 07/15/2025  -     Immunofixation, Serum; Future; Expected date: 07/15/2025          1, 4 5 Cryoglobulinemic vasculitis treated with Rituxan last 3/2023. (We elected to hold due to infections, but insurance was beginning to deny auth on RTX)   HCQ 200mg BID   Evoxac 30mg TID   Do not send to [facility] we prefer [facility] for Cryo labs.    Tramadol PRN    Prednisone 5mg daily, may increase to 10 PRN flares. Only used perhaps 3 times in the last months.    Hep and T. Spot neg 2020    2 Primary Sjogrens:   HCQ 200mg BID   Evoxac 30mg TID    Salagen not preferred despite trial.        3 Regarding joint vs soft tissue swelling:  Discussed that she does have appt for EDS genetic  testing not clear if this is soft tissue or joint mediated, another option is NM bone scan or MRI of actively swollen joint to see if we have any synovitis/tenosynovitis. We discussed trending ESR over time. Discussed adding lefunomide if joint thought to be inflammatory arthritis at play.   Failed MTX 6/2023 x 2 weeks with malaise and nausea.         No follow-ups on file.  The patient location is: Home [place]  The chief complaint leading to consultation is: medication management and f/u   Visit type: Virtual visit with synchronous audio and video  Total time spent with patient: 50min consultation with greater than 50% of that time included Preparing to see the patient (review records, tests), Obtaining and/or reviewing separately obtained historical data, Performing a medically appropriate examination and/or evaluation , Ordering medications, tests, and/or procedures, Referring and communicating with other healthcare professionals , Documenting clinical information in the electronic or other health record and Independently interpreting results  (as warranted) & communicating results to the patient/family/caregiver. All questions answered.    Each patient to whom he or she provides medical services by telemedicine is:  (1) informed of the relationship between the physician and patient and the respective role of any other health care provider with respect to management of the patient; and (2) notified that he or she may decline to receive medical services by telemedicine and may withdraw from such care at any time.           Thank you for allowing me to participate in the care of this very pleasant patient.

## 2025-07-16 ENCOUNTER — PATIENT MESSAGE (OUTPATIENT)
Dept: RHEUMATOLOGY | Facility: CLINIC | Age: 45
End: 2025-07-16
Payer: COMMERCIAL

## 2025-07-26 ENCOUNTER — HOSPITAL ENCOUNTER (EMERGENCY)
Facility: HOSPITAL | Age: 45
Discharge: HOME | End: 2025-07-26
Attending: EMERGENCY MEDICINE
Payer: COMMERCIAL

## 2025-07-26 ENCOUNTER — OFFICE VISIT (OUTPATIENT)
Dept: URGENT CARE | Facility: CLINIC | Age: 45
End: 2025-07-26
Payer: COMMERCIAL

## 2025-07-26 VITALS
DIASTOLIC BLOOD PRESSURE: 74 MMHG | OXYGEN SATURATION: 100 % | WEIGHT: 135 LBS | HEIGHT: 66 IN | HEART RATE: 97 BPM | SYSTOLIC BLOOD PRESSURE: 124 MMHG | RESPIRATION RATE: 18 BRPM | BODY MASS INDEX: 21.69 KG/M2 | TEMPERATURE: 98 F

## 2025-07-26 VITALS
HEIGHT: 66 IN | OXYGEN SATURATION: 100 % | DIASTOLIC BLOOD PRESSURE: 70 MMHG | TEMPERATURE: 98 F | BODY MASS INDEX: 20.73 KG/M2 | WEIGHT: 129 LBS | RESPIRATION RATE: 18 BRPM | HEART RATE: 80 BPM | SYSTOLIC BLOOD PRESSURE: 102 MMHG

## 2025-07-26 DIAGNOSIS — H43.812 VITREOUS DETACHMENT OF LEFT EYE: Primary | ICD-10-CM

## 2025-07-26 DIAGNOSIS — S05.02XA INJURY OF CONJUNCTIVA AND CORNEAL ABRASION OF LEFT EYE W/O FB, INITIAL ENCOUNTER: Primary | ICD-10-CM

## 2025-07-26 DIAGNOSIS — H11.32 SUBCONJUNCTIVAL HEMORRHAGE OF LEFT EYE: ICD-10-CM

## 2025-07-26 PROCEDURE — 99214 OFFICE O/P EST MOD 30 MIN: CPT | Mod: S$GLB,,, | Performed by: NURSE PRACTITIONER

## 2025-07-26 PROCEDURE — 99282 EMERGENCY DEPT VISIT SF MDM: CPT

## 2025-07-26 RX ORDER — GENTAMICIN SULFATE 0.3 %
0.5 OINTMENT (GRAM) OPHTHALMIC (EYE) 3 TIMES DAILY
Qty: 3.5 G | Refills: 0 | Status: SHIPPED | OUTPATIENT
Start: 2025-07-26 | End: 2025-07-31

## 2025-07-26 NOTE — CONSULTS
"[facility]   Consult Service Note      Chief complaint/Reason for Consult: "floaters"      History of Present Illness: Shamika Young is a 44 y.o. female who was transferred for concern of floaters after dog scratch to the left eye this morning around 8 am. Pt reports new floaters in the left eye denies flashes of light, curtain in vision, decreased vision. Pt reports she has feels like something is in her eye. Pt has a history of Sjögrens on plaquenil and small vessel vasculitis. She follows yearly with an ophthalmologist.     Past Ocular Hx: No past ocular surgeries, Myopic wears glasses     Current eye gtts: None      PMHx:  has a past medical history of Allergy, Anxiety, Bleeding disorder, Depression, Facial trauma, Food allergy, GERD (gastroesophageal reflux disease), Headache, Lumbago with sciatica, right side, Lumbar disc disease, Migraine headache, Osteoarthritis, Other chronic pain, Panic disorder, Positive CHELSEA (antinuclear antibody), Purpura, Seasonal allergies, Sjogren's syndrome, and Vasculitis.     PSurgHx:  has a past surgical history that includes Cyst Removal (2012); Tonsillectomy and adenoidectomy; lumbar spine procedure; Hysteroscopy (N/A, 07/17/2020); Intrauterine device insertion (07/17/2020); Removal of intrauterine device (IUD) (07/17/2020); Tonsillectomy (1995); Adenoidectomy (1995); turbonate reduction (Bilateral, 12/06/2023); and Esophagogastroduodenoscopy (N/A, 5/20/2025).     Medications: Reviewed     Allergies: is allergic to pecan nut, tree nut, tree nuts, walnut, cefaclor, ciprofloxacin, biaxin [clarithromycin], erythromycin, azithromycin, penicillins, and tetracyclines.     Social Hx:  reports that she has quit smoking. She has been exposed to tobacco smoke. She has never used smokeless tobacco. She reports current alcohol use of about 2.0 standard drinks of alcohol per week. She reports that she does not use drugs.     Family Hx: No family history of glaucoma, macular " degeneration    ROS: Negative x 10 except for complaints as described in HPI     Ocular examination/Dilated fundus examination:  Base Eye Exam       Visual Acuity (Jessica Card)         Right Left    Dist cc 20/20 20/20              Tonometry (Applanation, 6:06 PM)         Right Left    Pressure 6 6              Pupils         Dark Light Shape React APD    Right 3 2 Round Brisk None    Left 3 2 Round Brisk None              Visual Fields         Right Left     Full Full              Extraocular Movement         Right Left     Full, Ortho Full, Ortho              Neuro/Psych       Oriented x3: Yes              Dilation       Both eyes: 1% Mydriacyl, 2.5% Phenylephrine @ 6:07 PM                  Slit Lamp and Fundus Exam       External Exam         Right Left    External Normal Normal              Slit Lamp Exam         Right Left    Lids/Lashes Normal Normal    Conjunctiva/Sclera White and quiet Nasal subconj heme    Cornea PEEs PEEs    Anterior Chamber Deep and quiet Deep and quiet    Iris Round and reactive Round and reactive    Lens Clear Clear    Anterior Vitreous Normal Normal              Fundus Exam         Right Left    Disc Peripapillary atrophy Peripapillary atrophy    C/D Ratio 0.3 0.3    Macula Normal Normal    Vessels Normal Normal    Periphery Normal, Flat & intact 360°, no holes, tears, RD Normal, Flat & intact 360°, no holes, tears, RD                    Assessment/Plan:   # Posterior vitreous detachment, left eye  - Patient endorsing new floaters that began this morning. Denies flashes of lights, curtain in vision  - [facility] nml, IOP wnl, EOMi, CVF full  -  Pt is myopic; no personal history of RT/RD  - Trauma to the eye this morning when her dog scratched her eye.   - No evidence of retinal tear, hole, or detachment on dilated fundoscopic exam  - Elmer's sign negative  - RD precautions given (increase in floaters or flashing lights, worsening vision, appearance of curtain or shadow- patient to return  immediately)  - RTC in 4 weeks for repeat DFE with scleral depression    # Concern for corneal abrasion, left eye   - Pt was scratched by dog earlier in the morning. Pt felt foreign body sensation in the eye but that has now resolved. Denies eye pain, light sensitivity.   - Anterior exam with nasal subconj heme. Upper eyelid everted no foreign body. Pt has hx of Sjogren's with PEEs on exam.  - Start PFATs 3x daily   - Can continue Gentamicin ointment prescribed at OSH for 2-3 days discussed return precautions.  Pt has scheduled follow up with outside ophthalmologist on Monday for K check.     Discussed with Dr. Vern Thomas MD  [facility] PGY2

## 2025-07-26 NOTE — ED PROVIDER NOTES
"Chief Complaint   Eye Injury (Sent from [facility] to see ophthal, scratch to L eye)      History Of Present Illness   Shamika Young is a 44 y.o. female presenting with eye trauma this morning with subsequent development of floaters in the left eye.  She was diagnosed with a corneal abrasion and subconjunctival hemorrhage initially at urgent care, but subsequently developed floaters after that evaluation and went to an outside emergency department.  She is concerned for retinal issues as she is very nearsighted and has been told to watch out for floaters.  She was transferred here for ophthalmology evaluation.  Currently, she states that her vision is relatively intact except for the floaters in the left eye.  She denies eye pain.      Review of patient's allergies indicates:   Allergen Reactions    Pecan nut Anaphylaxis    Tree nut Anaphylaxis    Tree nuts Anaphylaxis    [place] Anaphylaxis    Cefaclor Rash    Ciprofloxacin Rash    Biaxin [clarithromycin] Other (See Comments)     GI symptoms     Erythromycin      Unknown    Azithromycin Rash    Penicillins Rash    Tetracyclines Hives       Medications Ordered Prior to Encounter[1]    Past History   As per HPI and below:  Past Medical History[2]  Past Surgical History[3]    Social History[4]    Physical Exam     Vitals:    07/26/25 1614   BP: 124/74   Pulse: 97   Resp: 18   Temp: 98.3 °F (36.8 °C)   TempSrc: Oral   SpO2: 100%   Weight: 61.2 kg (135 lb)   Height: 5' 6" (1.676 m)     Appearance: No acute distress.  Skin: No rashes seen.  Good turgor.  No abrasions.  No ecchymoses.  Eyes: Subconjunctival hemorrhage OS.  Neurologic: Motor intact.  Sensation intact.  Cranial nerves intact.  Mental Status:  Alert and oriented x 3.  Appropriate, conversant.      Initial MDM   Floaters OS, transferred here for ophthalmology.  Visual acuity is intact.  Will discuss with ophthalmology.    Medications Given   Medications - No data to display    Results and Course   Abnormal " Labs Reviewed - No data to display    Imaging Results    None         ED Course as of 07/26/25 1907   Sat Jul 26, 2025   1721 Discussed with ophthalmology [DC]      ED Course User Index  [DC] Kwadwo Alegria MD               MDM, Impression and Plan   44 y.o. female with vitreous detachment in the left eye per ophthalmology.  She is okay to be discharged and they will follow her up in clinic.  They recommend she continue the ointment prescribed earlier, but they did not see a corneal abrasion on their exam.         Final diagnoses:  [H43.812] Vitreous detachment of left eye (Primary)  [H11.32] Subconjunctival hemorrhage of left eye          ED Disposition Condition    Discharge Stable          ED Prescriptions    None       Follow-up Information       Follow up With Specialties Details Why Contact Info Additional Information    [facility] - [address] [place] [address] Ophthalmology In 1 month  [address]  [facility] [id number] [phone] Please arrive on the 10th floor for check-in.                 [1]   Current Facility-Administered Medications on File Prior to Encounter   Medication Dose Route Frequency Provider Last Rate Last Admin    [COMPLETED] fluorescein ophthalmic strip 1 each  1 strip Left Eye ED 1 Time Cherry Mendoza, NP   1 each at 07/26/25 1037    [COMPLETED] TETRAcaine HCl (PF) 0.5 % Drop 2 drop  2 drop Left Eye ED 1 Time Cherry Mendoza NP   2 drop at 07/26/25 1037     Current Outpatient Medications on File Prior to Encounter   Medication Sig Dispense Refill    ALPRAZolam (XANAX) 0.5 MG tablet Take 0.5 mg by mouth daily as needed.      azelastine (ASTELIN) 137 mcg (0.1 %) nasal spray 1 spray (137 mcg total) by Nasal route 2 (two) times daily. 30 mL 0    buPROPion (WELLBUTRIN) 75 MG tablet Take 75 mg by mouth once daily.      bupropion HBr (APLENZIN ORAL)       busPIRone (BUSPAR) 10 MG tablet Take 10 mg by mouth 3 (three) times daily.      cevimeline (EVOXAC) 30 mg capsule TAKE 1 CAPSULE BY MOUTH  THREE TIMES DAILY 90 capsule 0    cevimeline (EVOXAC) 30 mg capsule Take 1 capsule (30 mg total) by mouth 3 (three) times daily. 90 capsule 11    dicyclomine (BENTYL) 20 mg tablet Take 1 tablet (20 mg total) by mouth every 6 (six) hours. 30 tablet 0    EPINEPHrine (EPIPEN 2-CA) 0.3 mg/0.3 mL AtIn Inject 0.6 mLs (0.6 mg total) into the muscle once. for 1 dose (Patient not taking: Reported on 4/24/2025) 0.6 mL 0    ergocalciferol, vitamin D2, (VITAMIN D ORAL) Take by mouth.      estradioL (ESTRACE) 0.01 % (0.1 mg/gram) vaginal cream Place vaginally.      famotidine (PEPCID) 20 MG tablet Take 1 tablet (20 mg total) by mouth 2 (two) times daily. 60 tablet 2    gentamicin (GARAMYCIN) 0.3 % (3 mg/gram) ophthalmic ointment Place 0.5 inches into the left eye 3 (three) times daily. for 5 days 3.5 g 0    hydroxychloroquine (PLAQUENIL) 200 mg tablet Take 1 tablet (200 mg total) by mouth 2 (two) times a day. 60 tablet 11    LYLLANA 0.05 mg/24 hr 1 patch twice a week.      methylphenidate HCl (RITALIN) 20 MG tablet Take 20 mg by mouth once daily.      montelukast (SINGULAIR) 10 mg tablet TAKE 1 TABLET(10 MG) BY MOUTH EVERY EVENING 90 tablet 3    pantoprazole (PROTONIX) 40 MG tablet Take 1 tablet (40 mg total) by mouth once daily. 30 tablet 2    predniSONE (DELTASONE) 5 MG tablet Take 1-4 tablets (5-20 mg total) by mouth daily as needed (pain). 75 tablet 3    progesterone (PROMETRIUM) 100 MG capsule Take 1 capsule every day by oral route at bedtime for 30 days.      propranoloL (INDERAL LA) 60 MG 24 hr capsule Take 60 mg by mouth.      rimegepant (NURTEC) 75 mg odt Dissolve 1 tablet by mouth every other day for migraine prevention 16 tablet 11    tirzepatide 2.5 mg/0.5 mL PnIj Inject 2.5 mg into the skin every 7 days.      traMADoL (ULTRAM) 50 mg tablet Take 1 tablet (50 mg total) by mouth every 12 (twelve) hours as needed for Pain (joint pains dx Primary Sjogrens with vasculitis.). 40 tablet 3    VYVANSE 30 mg capsule Take 30  mg by mouth nightly.     [2]   Past Medical History:  Diagnosis Date    Allergy     previously on immunotherapy    Anxiety     Bleeding disorder     Depression     Facial trauma     Food allergy     GERD (gastroesophageal reflux disease)     Headache     Lumbago with sciatica, right side     Lumbar disc disease     L4-L5    Migraine headache     Osteoarthritis     Other chronic pain     Panic disorder     Positive CHELSEA (antinuclear antibody)     Purpura     Seasonal allergies     Sjogren's syndrome     Vasculitis    [3]   Past Surgical History:  Procedure Laterality Date    ADENOIDECTOMY  1995    CYST REMOVAL  2012    mandibular    ESOPHAGOGASTRODUODENOSCOPY N/A 5/20/2025    Procedure: EGD (ESOPHAGOGASTRODUODENOSCOPY);  Surgeon: Terrence Jones MD;  Location: [facility];  Service: Gastroenterology;  Laterality: N/A;    HYSTEROSCOPY N/A 07/17/2020    Procedure: HYSTEROSCOPY/ with IUD removal;  Surgeon: Cherry Christine MD;  Location: [facility];  Service: OB/GYN;  Laterality: N/A;    INTRAUTERINE DEVICE INSERTION  07/17/2020    Procedure: INSERTION, INTRAUTERINE DEVICE;  Surgeon: Cherry Christine MD;  Location: [facility];  Service: OB/GYN;;    lumbar spine procedure      2004/2014    REMOVAL OF INTRAUTERINE DEVICE (IUD)  07/17/2020    Procedure: REMOVAL, INTRAUTERINE DEVICE;  Surgeon: Cherry Christine MD;  Location: [facility];  Service: OB/GYN;;    TONSILLECTOMY  1995    TONSILLECTOMY AND ADENOIDECTOMY      7th grade    turbonate reduction Bilateral 12/06/2023   [4]   Social History  Tobacco Use    Smoking status: Former     Passive exposure: Current    Smokeless tobacco: Never   Substance Use Topics    Alcohol use: Yes     Alcohol/week: 2.0 standard drinks of alcohol     Types: 2 Drinks containing 0.5 oz of alcohol per week     Comment: socially    Drug use: Never        Kwadwo Alegria MD  07/26/25 2091

## 2025-07-26 NOTE — ED TRIAGE NOTES
Shamika Young, a 44 y.o. female presents to the ED w/ complaint of left eye injury.     Triage note:  Chief Complaint   Patient presents with    Eye Injury     Sent from [facility] to see ophthal, scratch to L eye     Review of patient's allergies indicates:   Allergen Reactions    Pecan nut Anaphylaxis    Tree nut Anaphylaxis    Tree nuts Anaphylaxis    [place] Anaphylaxis    Cefaclor Rash    Ciprofloxacin Rash    Biaxin [clarithromycin] Other (See Comments)     GI symptoms     Erythromycin      Unknown    Azithromycin Rash    Penicillins Rash    Tetracyclines Hives     Past Medical History:   Diagnosis Date    Allergy     previously on immunotherapy    Anxiety     Bleeding disorder     Depression     Facial trauma     Food allergy     GERD (gastroesophageal reflux disease)     Headache     Lumbago with sciatica, right side     Lumbar disc disease     L4-L5    Migraine headache     Osteoarthritis     Other chronic pain     Panic disorder     Positive CHELSEA (antinuclear antibody)     Purpura     Seasonal allergies     Sjogren's syndrome     Vasculitis

## 2025-07-26 NOTE — PROGRESS NOTES
"Subjective:      Patient ID: Shamika Young is a 44 y.o. female.    Vitals:  height is 5' 6" (1.676 m) and weight is 58.5 kg (129 lb). Her oral temperature is 98 °F (36.7 °C). Her blood pressure is 102/70 and her pulse is 80. Her respiration is 18 and oxygen saturation is 100%.     Chief Complaint: Eye Injury    Pt states that her dog scratched her left eye this morning. Pt states that her eye is tender touch, redness, and she has fb sensation. Tdap is UTD. Pt denies photophobia. Tx saline flush    Eye Injury   The left eye is affected. This is a new problem. The current episode started today. The problem occurs constantly. The problem has been unchanged. The injury mechanism was a direct trauma. The pain is at a severity of 6/10. The pain is moderate. There is No known exposure to pink eye. She Wears contacts. Associated symptoms include eye redness and a foreign body sensation. Pertinent negatives include no blurred vision or photophobia. She has tried commercial eye wash for the symptoms. The treatment provided no relief.       Eyes:  Positive for eye redness. Negative for photophobia and blurred vision.      Objective:     Physical Exam   Eyes: Lids are normal. Pupils are equal, round, and reactive to light. Lids are everted and swept, no foreign bodies found. Left eye exhibits exudate. Left eye exhibits no discharge. No foreign body present in the left eye. Left conjunctiva is injected. Left conjunctiva has a hemorrhage. Left pupil is round, reactive and not sluggish.   Slit lamp exam:       The left eye shows corneal abrasion and fluorescein uptake.     Extraocular movement intact vision grossly intact gaze aligned appropriately   left eye Scott exam negative      Assessment:     1. Injury of conjunctiva and corneal abrasion of left eye w/o FB, initial encounter    2. Subconjunctival hemorrhage of left eye        Plan:     Patient has significant allergies.  Up-to-date is used to place patient on gentamicin " ointment.  Patient also has ophthalmologist but referral placed to follow up urgently.  Patient advised to only wear glasses until follow up with Ophthalmology or symptoms improve.  All questions and concerns were answered.  Strict ER precautions    Injury of conjunctiva and corneal abrasion of left eye w/o FB, initial encounter  -     gentamicin (GARAMYCIN) 0.3 % (3 mg/gram) ophthalmic ointment; Place 0.5 inches into the left eye 3 (three) times daily. for 5 days  Dispense: 3.5 g; Refill: 0  -     Ambulatory referral/consult to Ophthalmology    Subconjunctival hemorrhage of left eye  -     Ambulatory referral/consult to Ophthalmology      Patient Instructions   Start eye ointment.  Avoid contact wearing until symptoms resolve.  Follow up with Ophthalmology.  If symptoms worsen go to the ER.    You must understand that you've received an Urgent Care treatment only and that you may be released before all your medical problems are known or treated. You, the patient, will arrange for follow up care as instructed.  Follow up with your PCP or specialty clinic as directed in the next 1-2 weeks if not improved or as needed.  You can call [phone] to schedule an appointment with the appropriate provider.  If your condition worsens we recommend that you receive another evaluation at the emergency room immediately or contact your primary medical clinics after hours call service to discuss your concerns.  Please return here or go to the Emergency Department for any concerns or worsening of condition.

## 2025-07-26 NOTE — PATIENT INSTRUCTIONS
Start eye ointment.  Avoid contact wearing until symptoms resolve.  Follow up with Ophthalmology.  If symptoms worsen go to the ER.    You must understand that you've received an Urgent Care treatment only and that you may be released before all your medical problems are known or treated. You, the patient, will arrange for follow up care as instructed.  Follow up with your PCP or specialty clinic as directed in the next 1-2 weeks if not improved or as needed.  You can call [phone] to schedule an appointment with the appropriate provider.  If your condition worsens we recommend that you receive another evaluation at the emergency room immediately or contact your primary medical clinics after hours call service to discuss your concerns.  Please return here or go to the Emergency Department for any concerns or worsening of condition.

## 2025-07-27 NOTE — DISCHARGE INSTRUCTIONS
Continue eye ointment for 2-3 days    Take acetaminophen (Tylenol), ibuprofen (Motrin, Advil) or naproxen (Naprosyn, Aleve) over-the-counter for pain as needed.  Please strictly follow all instructions on the packaging and do not take more medication per dose and per day than the instructions recommend.

## 2025-07-28 ENCOUNTER — TELEPHONE (OUTPATIENT)
Dept: OPHTHALMOLOGY | Facility: CLINIC | Age: 45
End: 2025-07-28
Payer: COMMERCIAL

## 2025-07-28 NOTE — TELEPHONE ENCOUNTER
----- Message from Francine Thomas sent at 7/26/2025  7:25 PM CDT -----  Regarding: ED follow up  Hello,    This patient was seen as na inpatient/ED for PVD, left eye. Can they please get follow-up in Retina clinic in 1 month?    Best contact number: [phone]    Thank you!    Francine Thomas MD  [facility] PGY2

## 2025-08-13 ENCOUNTER — TELEPHONE (OUTPATIENT)
Dept: OPTOMETRY | Facility: CLINIC | Age: 45
End: 2025-08-13
Payer: COMMERCIAL

## 2025-08-18 ENCOUNTER — OFFICE VISIT (OUTPATIENT)
Dept: GASTROENTEROLOGY | Facility: CLINIC | Age: 45
End: 2025-08-18
Payer: COMMERCIAL

## 2025-08-18 VITALS — BODY MASS INDEX: 20.8 KG/M2 | WEIGHT: 129.44 LBS | HEIGHT: 66 IN

## 2025-08-18 DIAGNOSIS — Z12.11 SCREENING FOR COLON CANCER: ICD-10-CM

## 2025-08-18 DIAGNOSIS — R13.19 OTHER DYSPHAGIA: ICD-10-CM

## 2025-08-18 DIAGNOSIS — R09.89 CHOKING SENSATION: ICD-10-CM

## 2025-08-18 DIAGNOSIS — K21.00 GASTROESOPHAGEAL REFLUX DISEASE WITH ESOPHAGITIS WITHOUT HEMORRHAGE: ICD-10-CM

## 2025-08-18 DIAGNOSIS — Z98.890 HISTORY OF ESOPHAGOGASTRODUODENOSCOPY (EGD): Primary | ICD-10-CM

## 2025-08-18 PROCEDURE — 99214 OFFICE O/P EST MOD 30 MIN: CPT | Mod: S$GLB,,,

## 2025-08-18 PROCEDURE — 99999 PR PBB SHADOW E&M-EST. PATIENT-LVL V: CPT | Mod: PBBFAC,,,

## 2025-08-18 RX ORDER — PANTOPRAZOLE SODIUM 40 MG/1
40 TABLET, DELAYED RELEASE ORAL DAILY
Qty: 30 TABLET | Refills: 2 | Status: SHIPPED | OUTPATIENT
Start: 2025-08-18

## 2025-08-20 ENCOUNTER — PATIENT MESSAGE (OUTPATIENT)
Dept: SPEECH THERAPY | Facility: HOSPITAL | Age: 45
End: 2025-08-20
Payer: COMMERCIAL

## 2025-08-26 ENCOUNTER — CLINICAL SUPPORT (OUTPATIENT)
Dept: OPHTHALMOLOGY | Facility: CLINIC | Age: 45
End: 2025-08-26
Payer: COMMERCIAL

## 2025-08-26 ENCOUNTER — OFFICE VISIT (OUTPATIENT)
Dept: OPHTHALMOLOGY | Facility: CLINIC | Age: 45
End: 2025-08-26
Payer: COMMERCIAL

## 2025-08-26 DIAGNOSIS — H43.821 VITREOMACULAR ADHESION, RIGHT: ICD-10-CM

## 2025-08-26 DIAGNOSIS — H04.123 DRY EYE SYNDROME OF BOTH EYES: ICD-10-CM

## 2025-08-26 DIAGNOSIS — H43.812 POSTERIOR VITREOUS DETACHMENT, LEFT: Primary | ICD-10-CM

## 2025-08-26 PROCEDURE — 92201 OPSCPY EXTND RTA DRAW UNI/BI: CPT | Mod: S$GLB,,, | Performed by: OPHTHALMOLOGY

## 2025-08-26 PROCEDURE — 92134 CPTRZ OPH DX IMG PST SGM RTA: CPT | Mod: S$GLB,,, | Performed by: OPHTHALMOLOGY

## 2025-08-26 PROCEDURE — 99204 OFFICE O/P NEW MOD 45 MIN: CPT | Mod: S$GLB,,, | Performed by: OPHTHALMOLOGY

## 2025-08-26 PROCEDURE — 99999 PR PBB SHADOW E&M-EST. PATIENT-LVL IV: CPT | Mod: PBBFAC,,, | Performed by: OPHTHALMOLOGY
